# Patient Record
Sex: MALE | Race: ASIAN | NOT HISPANIC OR LATINO
[De-identification: names, ages, dates, MRNs, and addresses within clinical notes are randomized per-mention and may not be internally consistent; named-entity substitution may affect disease eponyms.]

---

## 2024-07-05 ENCOUNTER — TRANSCRIPTION ENCOUNTER (OUTPATIENT)
Age: 74
End: 2024-07-05

## 2024-07-06 ENCOUNTER — INPATIENT (INPATIENT)
Facility: HOSPITAL | Age: 74
LOS: 6 days | Discharge: HOME | DRG: 694 | End: 2024-07-13
Attending: INTERNAL MEDICINE | Admitting: UROLOGY
Payer: MEDICARE

## 2024-07-06 VITALS
SYSTOLIC BLOOD PRESSURE: 148 MMHG | HEART RATE: 112 BPM | OXYGEN SATURATION: 95 % | TEMPERATURE: 98 F | WEIGHT: 175.05 LBS | HEIGHT: 71 IN | DIASTOLIC BLOOD PRESSURE: 82 MMHG | RESPIRATION RATE: 20 BRPM

## 2024-07-06 DIAGNOSIS — N20.1 CALCULUS OF URETER: ICD-10-CM

## 2024-07-06 LAB
ALBUMIN SERPL ELPH-MCNC: 2.5 G/DL — LOW (ref 3.3–5)
ALBUMIN SERPL ELPH-MCNC: 3 G/DL — LOW (ref 3.3–5)
ALBUMIN SERPL ELPH-MCNC: 3.5 G/DL — SIGNIFICANT CHANGE UP (ref 3.3–5)
ALP SERPL-CCNC: 135 U/L — HIGH (ref 40–120)
ALP SERPL-CCNC: 149 U/L — HIGH (ref 40–120)
ALP SERPL-CCNC: 167 U/L — HIGH (ref 40–120)
ALT FLD-CCNC: 15 U/L — SIGNIFICANT CHANGE UP (ref 10–45)
ALT FLD-CCNC: 18 U/L — SIGNIFICANT CHANGE UP (ref 10–45)
ALT FLD-CCNC: 21 U/L — SIGNIFICANT CHANGE UP (ref 10–45)
ANION GAP SERPL CALC-SCNC: 12 MMOL/L — SIGNIFICANT CHANGE UP (ref 5–17)
ANION GAP SERPL CALC-SCNC: 15 MMOL/L — SIGNIFICANT CHANGE UP (ref 5–17)
ANION GAP SERPL CALC-SCNC: 16 MMOL/L — SIGNIFICANT CHANGE UP (ref 5–17)
APPEARANCE UR: ABNORMAL
APTT BLD: 27.9 SEC — SIGNIFICANT CHANGE UP (ref 24.5–35.6)
AST SERPL-CCNC: 14 U/L — SIGNIFICANT CHANGE UP (ref 10–40)
AST SERPL-CCNC: 15 U/L — SIGNIFICANT CHANGE UP (ref 10–40)
AST SERPL-CCNC: 25 U/L — SIGNIFICANT CHANGE UP (ref 10–40)
BACTERIA # UR AUTO: ABNORMAL /HPF
BASE EXCESS BLDV CALC-SCNC: -1.9 MMOL/L — SIGNIFICANT CHANGE UP (ref -2–3)
BASE EXCESS BLDV CALC-SCNC: -2 MMOL/L — SIGNIFICANT CHANGE UP (ref -2–3)
BASE EXCESS BLDV CALC-SCNC: -2.1 MMOL/L — LOW (ref -2–3)
BASOPHILS # BLD AUTO: 0 K/UL — SIGNIFICANT CHANGE UP (ref 0–0.2)
BASOPHILS NFR BLD AUTO: 0 % — SIGNIFICANT CHANGE UP (ref 0–2)
BILIRUB SERPL-MCNC: 0.5 MG/DL — SIGNIFICANT CHANGE UP (ref 0.2–1.2)
BILIRUB SERPL-MCNC: 0.6 MG/DL — SIGNIFICANT CHANGE UP (ref 0.2–1.2)
BILIRUB SERPL-MCNC: 0.8 MG/DL — SIGNIFICANT CHANGE UP (ref 0.2–1.2)
BILIRUB UR-MCNC: NEGATIVE — SIGNIFICANT CHANGE UP
BUN SERPL-MCNC: 30 MG/DL — HIGH (ref 7–23)
BUN SERPL-MCNC: 36 MG/DL — HIGH (ref 7–23)
BUN SERPL-MCNC: 36 MG/DL — HIGH (ref 7–23)
CA-I SERPL-SCNC: 1.08 MMOL/L — LOW (ref 1.15–1.33)
CA-I SERPL-SCNC: 1.19 MMOL/L — SIGNIFICANT CHANGE UP (ref 1.15–1.33)
CA-I SERPL-SCNC: 1.19 MMOL/L — SIGNIFICANT CHANGE UP (ref 1.15–1.33)
CALCIUM SERPL-MCNC: 8 MG/DL — LOW (ref 8.4–10.5)
CALCIUM SERPL-MCNC: 8.6 MG/DL — SIGNIFICANT CHANGE UP (ref 8.4–10.5)
CALCIUM SERPL-MCNC: 9.6 MG/DL — SIGNIFICANT CHANGE UP (ref 8.4–10.5)
CAST: 3 /LPF — SIGNIFICANT CHANGE UP (ref 0–4)
CHLORIDE BLDV-SCNC: 103 MMOL/L — SIGNIFICANT CHANGE UP (ref 96–108)
CHLORIDE BLDV-SCNC: 91 MMOL/L — LOW (ref 96–108)
CHLORIDE BLDV-SCNC: 93 MMOL/L — LOW (ref 96–108)
CHLORIDE SERPL-SCNC: 102 MMOL/L — SIGNIFICANT CHANGE UP (ref 96–108)
CHLORIDE SERPL-SCNC: 88 MMOL/L — LOW (ref 96–108)
CHLORIDE SERPL-SCNC: 90 MMOL/L — LOW (ref 96–108)
CO2 BLDV-SCNC: 23 MMOL/L — SIGNIFICANT CHANGE UP (ref 22–26)
CO2 BLDV-SCNC: 25 MMOL/L — SIGNIFICANT CHANGE UP (ref 22–26)
CO2 BLDV-SCNC: 26 MMOL/L — SIGNIFICANT CHANGE UP (ref 22–26)
CO2 SERPL-SCNC: 19 MMOL/L — LOW (ref 22–31)
CO2 SERPL-SCNC: 20 MMOL/L — LOW (ref 22–31)
CO2 SERPL-SCNC: 20 MMOL/L — LOW (ref 22–31)
COLOR SPEC: YELLOW — SIGNIFICANT CHANGE UP
CREAT SERPL-MCNC: 1.48 MG/DL — HIGH (ref 0.5–1.3)
CREAT SERPL-MCNC: 1.72 MG/DL — HIGH (ref 0.5–1.3)
CREAT SERPL-MCNC: 1.85 MG/DL — HIGH (ref 0.5–1.3)
DIFF PNL FLD: ABNORMAL
EGFR: 38 ML/MIN/1.73M2 — LOW
EGFR: 38 ML/MIN/1.73M2 — LOW
EGFR: 41 ML/MIN/1.73M2 — LOW
EGFR: 41 ML/MIN/1.73M2 — LOW
EGFR: 50 ML/MIN/1.73M2 — LOW
EGFR: 50 ML/MIN/1.73M2 — LOW
EOSINOPHIL # BLD AUTO: 0 K/UL — SIGNIFICANT CHANGE UP (ref 0–0.5)
EOSINOPHIL NFR BLD AUTO: 0 % — SIGNIFICANT CHANGE UP (ref 0–6)
GAS PNL BLDV: 123 MMOL/L — LOW (ref 136–145)
GAS PNL BLDV: 125 MMOL/L — LOW (ref 136–145)
GAS PNL BLDV: 135 MMOL/L — LOW (ref 136–145)
GAS PNL BLDV: SIGNIFICANT CHANGE UP
GLUCOSE BLDC GLUCOMTR-MCNC: 309 MG/DL — HIGH (ref 70–99)
GLUCOSE BLDV-MCNC: 157 MG/DL — HIGH (ref 70–99)
GLUCOSE BLDV-MCNC: 563 MG/DL — CRITICAL HIGH (ref 70–99)
GLUCOSE BLDV-MCNC: >660 MG/DL — CRITICAL HIGH (ref 70–99)
GLUCOSE SERPL-MCNC: 163 MG/DL — HIGH (ref 70–99)
GLUCOSE SERPL-MCNC: 524 MG/DL — CRITICAL HIGH (ref 70–99)
GLUCOSE SERPL-MCNC: 642 MG/DL — CRITICAL HIGH (ref 70–99)
GLUCOSE UR QL: >=1000 MG/DL
HCO3 BLDV-SCNC: 22 MMOL/L — SIGNIFICANT CHANGE UP (ref 22–29)
HCO3 BLDV-SCNC: 24 MMOL/L — SIGNIFICANT CHANGE UP (ref 22–29)
HCO3 BLDV-SCNC: 25 MMOL/L — SIGNIFICANT CHANGE UP (ref 22–29)
HCT VFR BLD CALC: 28.8 % — LOW (ref 39–50)
HCT VFR BLD CALC: 32.8 % — LOW (ref 39–50)
HCT VFR BLDA CALC: 27 % — LOW (ref 39–51)
HCT VFR BLDA CALC: 33 % — LOW (ref 39–51)
HCT VFR BLDA CALC: 34 % — LOW (ref 39–51)
HGB BLD CALC-MCNC: 11 G/DL — LOW (ref 12.6–17.4)
HGB BLD CALC-MCNC: 11.4 G/DL — LOW (ref 12.6–17.4)
HGB BLD CALC-MCNC: 9.1 G/DL — LOW (ref 12.6–17.4)
HGB BLD-MCNC: 11.4 G/DL — LOW (ref 13–17)
HGB BLD-MCNC: 9.7 G/DL — LOW (ref 13–17)
HOROWITZ INDEX BLDV+IHG-RTO: 21 — SIGNIFICANT CHANGE UP
INR BLD: 1.22 RATIO — HIGH (ref 0.85–1.18)
KETONES UR-MCNC: 15 MG/DL
LACTATE BLDV-MCNC: 2.1 MMOL/L — HIGH (ref 0.5–2)
LACTATE BLDV-MCNC: 2.3 MMOL/L — HIGH (ref 0.5–2)
LACTATE BLDV-MCNC: 2.8 MMOL/L — HIGH (ref 0.5–2)
LACTATE SERPL-SCNC: 2.1 MMOL/L — HIGH (ref 0.5–2)
LEUKOCYTE ESTERASE UR-ACNC: ABNORMAL
LYMPHOCYTES # BLD AUTO: 0.36 K/UL — LOW (ref 1–3.3)
LYMPHOCYTES # BLD AUTO: 1.7 % — LOW (ref 13–44)
MAGNESIUM SERPL-MCNC: 2.3 MG/DL — SIGNIFICANT CHANGE UP (ref 1.6–2.6)
MANUAL SMEAR VERIFICATION: SIGNIFICANT CHANGE UP
MCHC RBC-ENTMCNC: 29.8 PG — SIGNIFICANT CHANGE UP (ref 27–34)
MCHC RBC-ENTMCNC: 30.1 PG — SIGNIFICANT CHANGE UP (ref 27–34)
MCHC RBC-ENTMCNC: 33.7 GM/DL — SIGNIFICANT CHANGE UP (ref 32–36)
MCHC RBC-ENTMCNC: 34.8 GM/DL — SIGNIFICANT CHANGE UP (ref 32–36)
MCV RBC AUTO: 86.5 FL — SIGNIFICANT CHANGE UP (ref 80–100)
MCV RBC AUTO: 88.3 FL — SIGNIFICANT CHANGE UP (ref 80–100)
MONOCYTES # BLD AUTO: 0.36 K/UL — SIGNIFICANT CHANGE UP (ref 0–0.9)
MONOCYTES NFR BLD AUTO: 1.7 % — LOW (ref 2–14)
NEUTROPHILS # BLD AUTO: 20.31 K/UL — HIGH (ref 1.8–7.4)
NEUTROPHILS NFR BLD AUTO: 94 % — HIGH (ref 43–77)
NEUTS BAND # BLD: 2.6 % — SIGNIFICANT CHANGE UP (ref 0–8)
NEUTS BAND NFR BLD: 2.6 % — SIGNIFICANT CHANGE UP (ref 0–8)
NITRITE UR-MCNC: POSITIVE
NRBC # BLD: 0 /100 WBCS — SIGNIFICANT CHANGE UP (ref 0–0)
NRBC BLD-RTO: 0 /100 WBCS — SIGNIFICANT CHANGE UP (ref 0–0)
PCO2 BLDV: 36 MMHG — LOW (ref 42–55)
PCO2 BLDV: 43 MMHG — SIGNIFICANT CHANGE UP (ref 42–55)
PCO2 BLDV: 50 MMHG — SIGNIFICANT CHANGE UP (ref 42–55)
PH BLDV: 7.3 — LOW (ref 7.32–7.43)
PH BLDV: 7.35 — SIGNIFICANT CHANGE UP (ref 7.32–7.43)
PH BLDV: 7.4 — SIGNIFICANT CHANGE UP (ref 7.32–7.43)
PH UR: 6.5 — SIGNIFICANT CHANGE UP (ref 5–8)
PHOSPHATE SERPL-MCNC: 2.3 MG/DL — LOW (ref 2.5–4.5)
PLAT MORPH BLD: NORMAL — SIGNIFICANT CHANGE UP
PLATELET # BLD AUTO: 340 K/UL — SIGNIFICANT CHANGE UP (ref 150–400)
PLATELET # BLD AUTO: 349 K/UL — SIGNIFICANT CHANGE UP (ref 150–400)
PO2 BLDV: 29 MMHG — SIGNIFICANT CHANGE UP (ref 25–45)
PO2 BLDV: 37 MMHG — SIGNIFICANT CHANGE UP (ref 25–45)
PO2 BLDV: 44 MMHG — SIGNIFICANT CHANGE UP (ref 25–45)
POTASSIUM BLDV-SCNC: 3.7 MMOL/L — SIGNIFICANT CHANGE UP (ref 3.5–5.1)
POTASSIUM BLDV-SCNC: 4.2 MMOL/L — SIGNIFICANT CHANGE UP (ref 3.5–5.1)
POTASSIUM BLDV-SCNC: 4.6 MMOL/L — SIGNIFICANT CHANGE UP (ref 3.5–5.1)
POTASSIUM SERPL-MCNC: 3.9 MMOL/L — SIGNIFICANT CHANGE UP (ref 3.5–5.3)
POTASSIUM SERPL-MCNC: 4.3 MMOL/L — SIGNIFICANT CHANGE UP (ref 3.5–5.3)
POTASSIUM SERPL-MCNC: 4.6 MMOL/L — SIGNIFICANT CHANGE UP (ref 3.5–5.3)
POTASSIUM SERPL-SCNC: 3.9 MMOL/L — SIGNIFICANT CHANGE UP (ref 3.5–5.3)
POTASSIUM SERPL-SCNC: 4.3 MMOL/L — SIGNIFICANT CHANGE UP (ref 3.5–5.3)
POTASSIUM SERPL-SCNC: 4.6 MMOL/L — SIGNIFICANT CHANGE UP (ref 3.5–5.3)
PROT SERPL-MCNC: 6.4 G/DL — SIGNIFICANT CHANGE UP (ref 6–8.3)
PROT SERPL-MCNC: 7.5 G/DL — SIGNIFICANT CHANGE UP (ref 6–8.3)
PROT SERPL-MCNC: 8.1 G/DL — SIGNIFICANT CHANGE UP (ref 6–8.3)
PROT UR-MCNC: 100 MG/DL
PROTHROM AB SERPL-ACNC: 12.7 SEC — SIGNIFICANT CHANGE UP (ref 9.5–13)
RBC # BLD: 3.26 M/UL — LOW (ref 4.2–5.8)
RBC # BLD: 3.79 M/UL — LOW (ref 4.2–5.8)
RBC # FLD: 11.8 % — SIGNIFICANT CHANGE UP (ref 10.3–14.5)
RBC # FLD: 11.8 % — SIGNIFICANT CHANGE UP (ref 10.3–14.5)
RBC BLD AUTO: SIGNIFICANT CHANGE UP
RBC CASTS # UR COMP ASSIST: 20 /HPF — HIGH (ref 0–4)
REVIEW: SIGNIFICANT CHANGE UP
SAO2 % BLDV: 42.8 % — LOW (ref 67–88)
SAO2 % BLDV: 66.5 % — LOW (ref 67–88)
SAO2 % BLDV: 74.1 % — SIGNIFICANT CHANGE UP (ref 67–88)
SODIUM SERPL-SCNC: 123 MMOL/L — LOW (ref 135–145)
SODIUM SERPL-SCNC: 125 MMOL/L — LOW (ref 135–145)
SODIUM SERPL-SCNC: 134 MMOL/L — LOW (ref 135–145)
SP GR SPEC: 1.02 — SIGNIFICANT CHANGE UP (ref 1–1.03)
SQUAMOUS # UR AUTO: 1 /HPF — SIGNIFICANT CHANGE UP (ref 0–5)
TROPONIN T, HIGH SENSITIVITY RESULT: 21 NG/L — SIGNIFICANT CHANGE UP (ref 0–51)
UROBILINOGEN FLD QL: 1 MG/DL — SIGNIFICANT CHANGE UP (ref 0.2–1)
WBC # BLD: 21.03 K/UL — HIGH (ref 3.8–10.5)
WBC # BLD: 21.27 K/UL — HIGH (ref 3.8–10.5)
WBC # FLD AUTO: 21.03 K/UL — HIGH (ref 3.8–10.5)
WBC # FLD AUTO: 21.27 K/UL — HIGH (ref 3.8–10.5)
WBC UR QL: >998 /HPF — HIGH (ref 0–5)

## 2024-07-06 PROCEDURE — 74176 CT ABD & PELVIS W/O CONTRAST: CPT | Mod: 26,MC

## 2024-07-06 PROCEDURE — 52351 CYSTOURETERO & OR PYELOSCOPE: CPT | Mod: 22,LT

## 2024-07-06 PROCEDURE — 71045 X-RAY EXAM CHEST 1 VIEW: CPT | Mod: 26

## 2024-07-06 PROCEDURE — 76770 US EXAM ABDO BACK WALL COMP: CPT | Mod: 26

## 2024-07-06 PROCEDURE — 99223 1ST HOSP IP/OBS HIGH 75: CPT | Mod: 25

## 2024-07-06 PROCEDURE — 74420 UROGRAPHY RTRGR +-KUB: CPT | Mod: 26

## 2024-07-06 PROCEDURE — 99285 EMERGENCY DEPT VISIT HI MDM: CPT

## 2024-07-06 DEVICE — URETERAL CATH SOF-FLEX OPEN END 6FR .040" X 70CM: Type: IMPLANTABLE DEVICE | Site: LEFT | Status: FUNCTIONAL

## 2024-07-06 DEVICE — URETERAL CATH IMAGER II BERN 5FR 65CM: Type: IMPLANTABLE DEVICE | Site: LEFT | Status: FUNCTIONAL

## 2024-07-06 DEVICE — GUIDEWIRE SENSOR DUAL-FLEX NITINOL STRAIGHT .035" X 150CM: Type: IMPLANTABLE DEVICE | Site: LEFT | Status: FUNCTIONAL

## 2024-07-06 DEVICE — GWIRE ANGLE 0.035INX3CM: Type: IMPLANTABLE DEVICE | Site: LEFT | Status: FUNCTIONAL

## 2024-07-06 DEVICE — URETERAL STENT CONTOUR VL 6FR 22-30CM: Type: IMPLANTABLE DEVICE | Site: LEFT | Status: FUNCTIONAL

## 2024-07-06 RX ORDER — TRAMADOL HYDROCHLORIDE AND ACETAMINOPHEN 37.5; 325 MG/1; MG/1
1 TABLET ORAL EVERY 4 HOURS
Refills: 0 | Status: DISCONTINUED | OUTPATIENT
Start: 2024-07-06 | End: 2024-07-06

## 2024-07-06 RX ORDER — INSULIN LISPRO 100 U/ML
5 INJECTION, SOLUTION INTRAVENOUS; SUBCUTANEOUS ONCE
Refills: 0 | Status: COMPLETED | OUTPATIENT
Start: 2024-07-06 | End: 2024-07-06

## 2024-07-06 RX ORDER — SENNA 187 MG
2 TABLET ORAL AT BEDTIME
Refills: 0 | Status: DISCONTINUED | OUTPATIENT
Start: 2024-07-06 | End: 2024-07-06

## 2024-07-06 RX ORDER — CEFTRIAXONE 500 MG/1
1000 INJECTION, POWDER, FOR SOLUTION INTRAMUSCULAR; INTRAVENOUS EVERY 24 HOURS
Refills: 0 | Status: CANCELLED | OUTPATIENT
Start: 2024-07-07 | End: 2024-07-06

## 2024-07-06 RX ORDER — HEPARIN SODIUM 1000 [USP'U]/ML
5000 INJECTION INTRAVENOUS; SUBCUTANEOUS EVERY 8 HOURS
Refills: 0 | Status: DISCONTINUED | OUTPATIENT
Start: 2024-07-06 | End: 2024-07-06

## 2024-07-06 RX ORDER — SODIUM CHLORIDE 9 G/1000ML
1000 INJECTION, SOLUTION INTRAVENOUS
Refills: 0 | Status: DISCONTINUED | OUTPATIENT
Start: 2024-07-06 | End: 2024-07-08

## 2024-07-06 RX ORDER — CEFTRIAXONE 500 MG/1
1000 INJECTION, POWDER, FOR SOLUTION INTRAMUSCULAR; INTRAVENOUS ONCE
Refills: 0 | Status: COMPLETED | OUTPATIENT
Start: 2024-07-06 | End: 2024-07-06

## 2024-07-06 RX ORDER — ONDANSETRON HCL/PF 4 MG/2 ML
4 VIAL (ML) INJECTION EVERY 6 HOURS
Refills: 0 | Status: DISCONTINUED | OUTPATIENT
Start: 2024-07-06 | End: 2024-07-06

## 2024-07-06 RX ORDER — PHENYLEPHRINE HCL IN 0.9% NACL 0.5 MG/5ML
0.4 SYRINGE (ML) INTRAVENOUS
Qty: 40 | Refills: 0 | Status: DISCONTINUED | OUTPATIENT
Start: 2024-07-06 | End: 2024-07-07

## 2024-07-06 RX ORDER — SODIUM CHLORIDE 9 G/1000ML
1000 INJECTION, SOLUTION INTRAVENOUS ONCE
Refills: 0 | Status: COMPLETED | OUTPATIENT
Start: 2024-07-06 | End: 2024-07-06

## 2024-07-06 RX ORDER — TRAMADOL HYDROCHLORIDE AND ACETAMINOPHEN 37.5; 325 MG/1; MG/1
2 TABLET ORAL EVERY 6 HOURS
Refills: 0 | Status: DISCONTINUED | OUTPATIENT
Start: 2024-07-06 | End: 2024-07-06

## 2024-07-06 RX ORDER — ACETAMINOPHEN 500 MG/5ML
1000 LIQUID (ML) ORAL ONCE
Refills: 0 | Status: COMPLETED | OUTPATIENT
Start: 2024-07-06 | End: 2024-07-06

## 2024-07-06 RX ORDER — CALCIUM GLUCONATE 20 MG/ML
2 INJECTION, SOLUTION INTRAVENOUS ONCE
Refills: 0 | Status: COMPLETED | OUTPATIENT
Start: 2024-07-06 | End: 2024-07-07

## 2024-07-06 RX ORDER — METOCLOPRAMIDE HCL 10 MG
10 TABLET ORAL
Refills: 0 | Status: DISCONTINUED | OUTPATIENT
Start: 2024-07-06 | End: 2024-07-06

## 2024-07-06 RX ADMIN — CEFTRIAXONE 100 MILLIGRAM(S): 500 INJECTION, POWDER, FOR SOLUTION INTRAMUSCULAR; INTRAVENOUS at 15:37

## 2024-07-06 RX ADMIN — SODIUM CHLORIDE 1000 MILLILITER(S): 9 INJECTION, SOLUTION INTRAVENOUS at 16:38

## 2024-07-06 RX ADMIN — INSULIN LISPRO 5 UNIT(S): 100 INJECTION, SOLUTION INTRAVENOUS; SUBCUTANEOUS at 18:56

## 2024-07-06 RX ADMIN — Medication 400 MILLIGRAM(S): at 15:37

## 2024-07-06 RX ADMIN — INSULIN LISPRO 5 UNIT(S): 100 INJECTION, SOLUTION INTRAVENOUS; SUBCUTANEOUS at 16:37

## 2024-07-06 RX ADMIN — Medication 1000 MILLILITER(S): at 15:36

## 2024-07-06 RX ADMIN — SODIUM CHLORIDE 1000 MILLILITER(S): 9 INJECTION, SOLUTION INTRAVENOUS at 18:54

## 2024-07-07 DIAGNOSIS — E11.00 TYPE 2 DIABETES MELLITUS WITH HYPEROSMOLARITY WITHOUT NONKETOTIC HYPERGLYCEMIC-HYPEROSMOLAR COMA (NKHHC): ICD-10-CM

## 2024-07-07 DIAGNOSIS — E11.65 TYPE 2 DIABETES MELLITUS WITH HYPERGLYCEMIA: ICD-10-CM

## 2024-07-07 DIAGNOSIS — E78.5 HYPERLIPIDEMIA, UNSPECIFIED: ICD-10-CM

## 2024-07-07 LAB
A1C WITH ESTIMATED AVERAGE GLUCOSE RESULT: 10.7 % — HIGH (ref 4–5.6)
ADD ON TEST-SPECIMEN IN LAB: SIGNIFICANT CHANGE UP
ALBUMIN SERPL ELPH-MCNC: 2.5 G/DL — LOW (ref 3.3–5)
ALP SERPL-CCNC: 135 U/L — HIGH (ref 40–120)
ALT FLD-CCNC: 16 U/L — SIGNIFICANT CHANGE UP (ref 10–45)
ANION GAP SERPL CALC-SCNC: 16 MMOL/L — SIGNIFICANT CHANGE UP (ref 5–17)
AST SERPL-CCNC: 26 U/L — SIGNIFICANT CHANGE UP (ref 10–40)
BILIRUB SERPL-MCNC: 0.3 MG/DL — SIGNIFICANT CHANGE UP (ref 0.2–1.2)
BUN SERPL-MCNC: 32 MG/DL — HIGH (ref 7–23)
CALCIUM SERPL-MCNC: 8.3 MG/DL — LOW (ref 8.4–10.5)
CHLORIDE SERPL-SCNC: 99 MMOL/L — SIGNIFICANT CHANGE UP (ref 96–108)
CO2 SERPL-SCNC: 20 MMOL/L — LOW (ref 22–31)
CREAT SERPL-MCNC: 1.5 MG/DL — HIGH (ref 0.5–1.3)
EGFR: 49 ML/MIN/1.73M2 — LOW
EGFR: 49 ML/MIN/1.73M2 — LOW
ESTIMATED AVERAGE GLUCOSE: 260 MG/DL — HIGH (ref 68–114)
GLUCOSE BLDC GLUCOMTR-MCNC: 104 MG/DL — HIGH (ref 70–99)
GLUCOSE BLDC GLUCOMTR-MCNC: 193 MG/DL — HIGH (ref 70–99)
GLUCOSE BLDC GLUCOMTR-MCNC: 201 MG/DL — HIGH (ref 70–99)
GLUCOSE BLDC GLUCOMTR-MCNC: 243 MG/DL — HIGH (ref 70–99)
GLUCOSE BLDC GLUCOMTR-MCNC: 274 MG/DL — HIGH (ref 70–99)
GLUCOSE BLDC GLUCOMTR-MCNC: 335 MG/DL — HIGH (ref 70–99)
GLUCOSE BLDC GLUCOMTR-MCNC: 362 MG/DL — HIGH (ref 70–99)
GLUCOSE BLDC GLUCOMTR-MCNC: 373 MG/DL — HIGH (ref 70–99)
GLUCOSE SERPL-MCNC: 150 MG/DL — HIGH (ref 70–99)
GRAM STN FLD: ABNORMAL
HCT VFR BLD CALC: 29.3 % — LOW (ref 39–50)
HGB BLD-MCNC: 10 G/DL — LOW (ref 13–17)
MAGNESIUM SERPL-MCNC: 2.4 MG/DL — SIGNIFICANT CHANGE UP (ref 1.6–2.6)
MCHC RBC-ENTMCNC: 30 PG — SIGNIFICANT CHANGE UP (ref 27–34)
MCHC RBC-ENTMCNC: 34.1 GM/DL — SIGNIFICANT CHANGE UP (ref 32–36)
MCV RBC AUTO: 88 FL — SIGNIFICANT CHANGE UP (ref 80–100)
METHOD TYPE: SIGNIFICANT CHANGE UP
MSSA DNA SPEC QL NAA+PROBE: SIGNIFICANT CHANGE UP
NRBC # BLD: 0 /100 WBCS — SIGNIFICANT CHANGE UP (ref 0–0)
NRBC BLD-RTO: 0 /100 WBCS — SIGNIFICANT CHANGE UP (ref 0–0)
PHOSPHATE SERPL-MCNC: 2.4 MG/DL — LOW (ref 2.5–4.5)
PLATELET # BLD AUTO: 329 K/UL — SIGNIFICANT CHANGE UP (ref 150–400)
POTASSIUM SERPL-MCNC: 4.3 MMOL/L — SIGNIFICANT CHANGE UP (ref 3.5–5.3)
POTASSIUM SERPL-SCNC: 4.3 MMOL/L — SIGNIFICANT CHANGE UP (ref 3.5–5.3)
PROT SERPL-MCNC: 6.6 G/DL — SIGNIFICANT CHANGE UP (ref 6–8.3)
RBC # BLD: 3.33 M/UL — LOW (ref 4.2–5.8)
RBC # FLD: 11.9 % — SIGNIFICANT CHANGE UP (ref 10.3–14.5)
SODIUM SERPL-SCNC: 135 MMOL/L — SIGNIFICANT CHANGE UP (ref 135–145)
SPECIMEN SOURCE: SIGNIFICANT CHANGE UP
WBC # BLD: 23.79 K/UL — HIGH (ref 3.8–10.5)
WBC # FLD AUTO: 23.79 K/UL — HIGH (ref 3.8–10.5)

## 2024-07-07 PROCEDURE — 50432 PLMT NEPHROSTOMY CATHETER: CPT | Mod: LT

## 2024-07-07 PROCEDURE — 99222 1ST HOSP IP/OBS MODERATE 55: CPT | Mod: GC

## 2024-07-07 PROCEDURE — 99232 SBSQ HOSP IP/OBS MODERATE 35: CPT

## 2024-07-07 RX ORDER — INSULIN LISPRO 100 U/ML
INJECTION, SOLUTION INTRAVENOUS; SUBCUTANEOUS
Refills: 0 | Status: DISCONTINUED | OUTPATIENT
Start: 2024-07-07 | End: 2024-07-13

## 2024-07-07 RX ORDER — INSULIN LISPRO 100 U/ML
INJECTION, SOLUTION INTRAVENOUS; SUBCUTANEOUS EVERY 4 HOURS
Refills: 0 | Status: DISCONTINUED | OUTPATIENT
Start: 2024-07-07 | End: 2024-07-07

## 2024-07-07 RX ORDER — CEFAZOLIN SODIUM IN 0.9 % NACL 3 G/100 ML
INTRAVENOUS SOLUTION, PIGGYBACK (ML) INTRAVENOUS
Refills: 0 | Status: DISCONTINUED | OUTPATIENT
Start: 2024-07-07 | End: 2024-07-13

## 2024-07-07 RX ORDER — CEFAZOLIN SODIUM IN 0.9 % NACL 3 G/100 ML
2000 INTRAVENOUS SOLUTION, PIGGYBACK (ML) INTRAVENOUS ONCE
Refills: 0 | Status: COMPLETED | OUTPATIENT
Start: 2024-07-07 | End: 2024-07-07

## 2024-07-07 RX ORDER — INSULIN LISPRO 100 U/ML
INJECTION, SOLUTION INTRAVENOUS; SUBCUTANEOUS AT BEDTIME
Refills: 0 | Status: DISCONTINUED | OUTPATIENT
Start: 2024-07-07 | End: 2024-07-13

## 2024-07-07 RX ORDER — CEFTRIAXONE 500 MG/1
1000 INJECTION, POWDER, FOR SOLUTION INTRAMUSCULAR; INTRAVENOUS EVERY 24 HOURS
Refills: 0 | Status: DISCONTINUED | OUTPATIENT
Start: 2024-07-07 | End: 2024-07-07

## 2024-07-07 RX ORDER — INSULIN GLARGINE-YFGN 100 [IU]/ML
10 INJECTION, SOLUTION SUBCUTANEOUS ONCE
Refills: 0 | Status: COMPLETED | OUTPATIENT
Start: 2024-07-07 | End: 2024-07-07

## 2024-07-07 RX ORDER — INSULIN GLARGINE-YFGN 100 [IU]/ML
20 INJECTION, SOLUTION SUBCUTANEOUS EVERY MORNING
Refills: 0 | Status: DISCONTINUED | OUTPATIENT
Start: 2024-07-08 | End: 2024-07-09

## 2024-07-07 RX ORDER — INSULIN LISPRO 100 U/ML
8 INJECTION, SOLUTION INTRAVENOUS; SUBCUTANEOUS
Refills: 0 | Status: DISCONTINUED | OUTPATIENT
Start: 2024-07-07 | End: 2024-07-09

## 2024-07-07 RX ORDER — INSULIN LISPRO 100 U/ML
5 INJECTION, SOLUTION INTRAVENOUS; SUBCUTANEOUS
Refills: 0 | Status: DISCONTINUED | OUTPATIENT
Start: 2024-07-07 | End: 2024-07-07

## 2024-07-07 RX ORDER — INSULIN LISPRO 100 U/ML
INJECTION, SOLUTION INTRAVENOUS; SUBCUTANEOUS AT BEDTIME
Refills: 0 | Status: DISCONTINUED | OUTPATIENT
Start: 2024-07-07 | End: 2024-07-07

## 2024-07-07 RX ORDER — VANCOMYCIN HCL IN 5 % DEXTROSE 1.5G/250ML
1000 PLASTIC BAG, INJECTION (ML) INTRAVENOUS ONCE
Refills: 0 | Status: COMPLETED | OUTPATIENT
Start: 2024-07-07 | End: 2024-07-07

## 2024-07-07 RX ORDER — ACETAMINOPHEN 500 MG/5ML
1000 LIQUID (ML) ORAL EVERY 6 HOURS
Refills: 0 | Status: COMPLETED | OUTPATIENT
Start: 2024-07-07 | End: 2024-07-07

## 2024-07-07 RX ORDER — SODIUM PHOSPHATE,DIBASIC DIHYD
30 POWDER (GRAM) MISCELLANEOUS ONCE
Refills: 0 | Status: COMPLETED | OUTPATIENT
Start: 2024-07-07 | End: 2024-07-07

## 2024-07-07 RX ORDER — INSULIN LISPRO 100 U/ML
INJECTION, SOLUTION INTRAVENOUS; SUBCUTANEOUS
Refills: 0 | Status: DISCONTINUED | OUTPATIENT
Start: 2024-07-07 | End: 2024-07-07

## 2024-07-07 RX ORDER — CEFAZOLIN SODIUM IN 0.9 % NACL 3 G/100 ML
2000 INTRAVENOUS SOLUTION, PIGGYBACK (ML) INTRAVENOUS EVERY 8 HOURS
Refills: 0 | Status: DISCONTINUED | OUTPATIENT
Start: 2024-07-07 | End: 2024-07-13

## 2024-07-07 RX ADMIN — INSULIN LISPRO 10: 100 INJECTION, SOLUTION INTRAVENOUS; SUBCUTANEOUS at 11:17

## 2024-07-07 RX ADMIN — Medication 100 MILLIGRAM(S): at 14:57

## 2024-07-07 RX ADMIN — INSULIN LISPRO 8: 100 INJECTION, SOLUTION INTRAVENOUS; SUBCUTANEOUS at 07:21

## 2024-07-07 RX ADMIN — Medication 400 MILLIGRAM(S): at 05:48

## 2024-07-07 RX ADMIN — Medication 1000 MILLIGRAM(S): at 17:56

## 2024-07-07 RX ADMIN — Medication 400 MILLIGRAM(S): at 23:45

## 2024-07-07 RX ADMIN — SODIUM CHLORIDE 100 MILLILITER(S): 9 INJECTION, SOLUTION INTRAVENOUS at 01:24

## 2024-07-07 RX ADMIN — Medication 100 MILLIGRAM(S): at 21:14

## 2024-07-07 RX ADMIN — Medication 1000 MILLIGRAM(S): at 12:33

## 2024-07-07 RX ADMIN — Medication 1000 MILLIGRAM(S): at 05:55

## 2024-07-07 RX ADMIN — Medication 85 MILLIMOLE(S): at 04:36

## 2024-07-07 RX ADMIN — Medication 400 MILLIGRAM(S): at 17:41

## 2024-07-07 RX ADMIN — INSULIN GLARGINE-YFGN 10 UNIT(S): 100 INJECTION, SOLUTION SUBCUTANEOUS at 04:36

## 2024-07-07 RX ADMIN — INSULIN LISPRO 6: 100 INJECTION, SOLUTION INTRAVENOUS; SUBCUTANEOUS at 14:41

## 2024-07-07 RX ADMIN — SODIUM CHLORIDE 100 MILLILITER(S): 9 INJECTION, SOLUTION INTRAVENOUS at 07:14

## 2024-07-07 RX ADMIN — INSULIN LISPRO 4: 100 INJECTION, SOLUTION INTRAVENOUS; SUBCUTANEOUS at 18:27

## 2024-07-07 RX ADMIN — INSULIN LISPRO 8 UNIT(S): 100 INJECTION, SOLUTION INTRAVENOUS; SUBCUTANEOUS at 17:36

## 2024-07-07 RX ADMIN — Medication 400 MILLIGRAM(S): at 12:18

## 2024-07-07 RX ADMIN — Medication 250 MILLIGRAM(S): at 10:13

## 2024-07-07 RX ADMIN — CALCIUM GLUCONATE 200 GRAM(S): 20 INJECTION, SOLUTION INTRAVENOUS at 01:23

## 2024-07-07 RX ADMIN — SODIUM CHLORIDE 100 MILLILITER(S): 9 INJECTION, SOLUTION INTRAVENOUS at 19:45

## 2024-07-07 RX ADMIN — CEFTRIAXONE 100 MILLIGRAM(S): 500 INJECTION, POWDER, FOR SOLUTION INTRAMUSCULAR; INTRAVENOUS at 05:49

## 2024-07-08 LAB
-  CLINDAMYCIN: SIGNIFICANT CHANGE UP
-  ERYTHROMYCIN: SIGNIFICANT CHANGE UP
-  GENTAMICIN: SIGNIFICANT CHANGE UP
-  OXACILLIN: SIGNIFICANT CHANGE UP
-  PENICILLIN: SIGNIFICANT CHANGE UP
-  RIFAMPIN: SIGNIFICANT CHANGE UP
-  TETRACYCLINE: SIGNIFICANT CHANGE UP
-  TRIMETHOPRIM/SULFAMETHOXAZOLE: SIGNIFICANT CHANGE UP
-  VANCOMYCIN: SIGNIFICANT CHANGE UP
ALBUMIN SERPL ELPH-MCNC: 2.3 G/DL — LOW (ref 3.3–5)
ALBUMIN SERPL ELPH-MCNC: 2.5 G/DL — LOW (ref 3.3–5)
ALP SERPL-CCNC: 130 U/L — HIGH (ref 40–120)
ALP SERPL-CCNC: 147 U/L — HIGH (ref 40–120)
ALT FLD-CCNC: 12 U/L — SIGNIFICANT CHANGE UP (ref 10–45)
ALT FLD-CCNC: 14 U/L — SIGNIFICANT CHANGE UP (ref 10–45)
ANION GAP SERPL CALC-SCNC: 13 MMOL/L — SIGNIFICANT CHANGE UP (ref 5–17)
ANION GAP SERPL CALC-SCNC: 16 MMOL/L — SIGNIFICANT CHANGE UP (ref 5–17)
AST SERPL-CCNC: 14 U/L — SIGNIFICANT CHANGE UP (ref 10–40)
AST SERPL-CCNC: 21 U/L — SIGNIFICANT CHANGE UP (ref 10–40)
BASE EXCESS BLDV CALC-SCNC: 0.2 MMOL/L — SIGNIFICANT CHANGE UP (ref -2–3)
BASE EXCESS BLDV CALC-SCNC: 2.1 MMOL/L — SIGNIFICANT CHANGE UP (ref -2–3)
BILIRUB SERPL-MCNC: 0.3 MG/DL — SIGNIFICANT CHANGE UP (ref 0.2–1.2)
BILIRUB SERPL-MCNC: 0.4 MG/DL — SIGNIFICANT CHANGE UP (ref 0.2–1.2)
BUN SERPL-MCNC: 29 MG/DL — HIGH (ref 7–23)
BUN SERPL-MCNC: 32 MG/DL — HIGH (ref 7–23)
CA-I SERPL-SCNC: 1.1 MMOL/L — LOW (ref 1.15–1.33)
CA-I SERPL-SCNC: 1.16 MMOL/L — SIGNIFICANT CHANGE UP (ref 1.15–1.33)
CALCIUM SERPL-MCNC: 8.1 MG/DL — LOW (ref 8.4–10.5)
CALCIUM SERPL-MCNC: 8.2 MG/DL — LOW (ref 8.4–10.5)
CHLORIDE BLDV-SCNC: 102 MMOL/L — SIGNIFICANT CHANGE UP (ref 96–108)
CHLORIDE BLDV-SCNC: 97 MMOL/L — SIGNIFICANT CHANGE UP (ref 96–108)
CHLORIDE SERPL-SCNC: 96 MMOL/L — SIGNIFICANT CHANGE UP (ref 96–108)
CHLORIDE SERPL-SCNC: 99 MMOL/L — SIGNIFICANT CHANGE UP (ref 96–108)
CO2 BLDV-SCNC: 26 MMOL/L — SIGNIFICANT CHANGE UP (ref 22–26)
CO2 BLDV-SCNC: 29 MMOL/L — HIGH (ref 22–26)
CO2 SERPL-SCNC: 21 MMOL/L — LOW (ref 22–31)
CO2 SERPL-SCNC: 21 MMOL/L — LOW (ref 22–31)
CREAT SERPL-MCNC: 1.32 MG/DL — HIGH (ref 0.5–1.3)
CREAT SERPL-MCNC: 1.35 MG/DL — HIGH (ref 0.5–1.3)
CULTURE RESULTS: ABNORMAL
CULTURE RESULTS: ABNORMAL
EGFR: 55 ML/MIN/1.73M2 — LOW
EGFR: 55 ML/MIN/1.73M2 — LOW
EGFR: 57 ML/MIN/1.73M2 — LOW
EGFR: 57 ML/MIN/1.73M2 — LOW
GAS PNL BLDV: 131 MMOL/L — LOW (ref 136–145)
GAS PNL BLDV: 131 MMOL/L — LOW (ref 136–145)
GAS PNL BLDV: SIGNIFICANT CHANGE UP
GLUCOSE BLDC GLUCOMTR-MCNC: 136 MG/DL — HIGH (ref 70–99)
GLUCOSE BLDC GLUCOMTR-MCNC: 151 MG/DL — HIGH (ref 70–99)
GLUCOSE BLDC GLUCOMTR-MCNC: 156 MG/DL — HIGH (ref 70–99)
GLUCOSE BLDC GLUCOMTR-MCNC: 178 MG/DL — HIGH (ref 70–99)
GLUCOSE BLDC GLUCOMTR-MCNC: 211 MG/DL — HIGH (ref 70–99)
GLUCOSE BLDC GLUCOMTR-MCNC: 221 MG/DL — HIGH (ref 70–99)
GLUCOSE BLDC GLUCOMTR-MCNC: 223 MG/DL — HIGH (ref 70–99)
GLUCOSE BLDC GLUCOMTR-MCNC: 272 MG/DL — HIGH (ref 70–99)
GLUCOSE BLDV-MCNC: 155 MG/DL — HIGH (ref 70–99)
GLUCOSE BLDV-MCNC: 212 MG/DL — HIGH (ref 70–99)
GLUCOSE SERPL-MCNC: 155 MG/DL — HIGH (ref 70–99)
GLUCOSE SERPL-MCNC: 209 MG/DL — HIGH (ref 70–99)
HCO3 BLDV-SCNC: 25 MMOL/L — SIGNIFICANT CHANGE UP (ref 22–29)
HCO3 BLDV-SCNC: 27 MMOL/L — SIGNIFICANT CHANGE UP (ref 22–29)
HCT VFR BLD CALC: 28.8 % — LOW (ref 39–50)
HCT VFR BLD CALC: 31 % — LOW (ref 39–50)
HCT VFR BLDA CALC: 30 % — LOW (ref 39–51)
HCT VFR BLDA CALC: 33 % — LOW (ref 39–51)
HGB BLD CALC-MCNC: 10 G/DL — LOW (ref 12.6–17.4)
HGB BLD CALC-MCNC: 11 G/DL — LOW (ref 12.6–17.4)
HGB BLD-MCNC: 10.5 G/DL — LOW (ref 13–17)
HGB BLD-MCNC: 9.9 G/DL — LOW (ref 13–17)
HOROWITZ INDEX BLDV+IHG-RTO: 21 — SIGNIFICANT CHANGE UP
HOROWITZ INDEX BLDV+IHG-RTO: 21 — SIGNIFICANT CHANGE UP
LACTATE BLDV-MCNC: 0.8 MMOL/L — SIGNIFICANT CHANGE UP (ref 0.5–2)
LACTATE BLDV-MCNC: 1.5 MMOL/L — SIGNIFICANT CHANGE UP (ref 0.5–2)
MAGNESIUM SERPL-MCNC: 2 MG/DL — SIGNIFICANT CHANGE UP (ref 1.6–2.6)
MAGNESIUM SERPL-MCNC: 2.2 MG/DL — SIGNIFICANT CHANGE UP (ref 1.6–2.6)
MCHC RBC-ENTMCNC: 29.8 PG — SIGNIFICANT CHANGE UP (ref 27–34)
MCHC RBC-ENTMCNC: 30.3 PG — SIGNIFICANT CHANGE UP (ref 27–34)
MCHC RBC-ENTMCNC: 33.9 GM/DL — SIGNIFICANT CHANGE UP (ref 32–36)
MCHC RBC-ENTMCNC: 34.4 GM/DL — SIGNIFICANT CHANGE UP (ref 32–36)
MCV RBC AUTO: 88.1 FL — SIGNIFICANT CHANGE UP (ref 80–100)
MCV RBC AUTO: 88.1 FL — SIGNIFICANT CHANGE UP (ref 80–100)
METHOD TYPE: SIGNIFICANT CHANGE UP
NRBC # BLD: 0 /100 WBCS — SIGNIFICANT CHANGE UP (ref 0–0)
NRBC # BLD: 0 /100 WBCS — SIGNIFICANT CHANGE UP (ref 0–0)
NRBC BLD-RTO: 0 /100 WBCS — SIGNIFICANT CHANGE UP (ref 0–0)
NRBC BLD-RTO: 0 /100 WBCS — SIGNIFICANT CHANGE UP (ref 0–0)
ORGANISM # SPEC MICROSCOPIC CNT: ABNORMAL
PCO2 BLDV: 38 MMHG — LOW (ref 42–55)
PCO2 BLDV: 44 MMHG — SIGNIFICANT CHANGE UP (ref 42–55)
PH BLDV: 7.4 — SIGNIFICANT CHANGE UP (ref 7.32–7.43)
PH BLDV: 7.42 — SIGNIFICANT CHANGE UP (ref 7.32–7.43)
PHOSPHATE SERPL-MCNC: 2.4 MG/DL — LOW (ref 2.5–4.5)
PHOSPHATE SERPL-MCNC: 3 MG/DL — SIGNIFICANT CHANGE UP (ref 2.5–4.5)
PLATELET # BLD AUTO: 358 K/UL — SIGNIFICANT CHANGE UP (ref 150–400)
PLATELET # BLD AUTO: 404 K/UL — HIGH (ref 150–400)
PO2 BLDV: 35 MMHG — SIGNIFICANT CHANGE UP (ref 25–45)
PO2 BLDV: 76 MMHG — HIGH (ref 25–45)
POTASSIUM BLDV-SCNC: 3.3 MMOL/L — LOW (ref 3.5–5.1)
POTASSIUM BLDV-SCNC: 3.6 MMOL/L — SIGNIFICANT CHANGE UP (ref 3.5–5.1)
POTASSIUM SERPL-MCNC: 3.6 MMOL/L — SIGNIFICANT CHANGE UP (ref 3.5–5.3)
POTASSIUM SERPL-MCNC: 3.7 MMOL/L — SIGNIFICANT CHANGE UP (ref 3.5–5.3)
POTASSIUM SERPL-SCNC: 3.6 MMOL/L — SIGNIFICANT CHANGE UP (ref 3.5–5.3)
POTASSIUM SERPL-SCNC: 3.7 MMOL/L — SIGNIFICANT CHANGE UP (ref 3.5–5.3)
PROT SERPL-MCNC: 6.3 G/DL — SIGNIFICANT CHANGE UP (ref 6–8.3)
PROT SERPL-MCNC: 6.7 G/DL — SIGNIFICANT CHANGE UP (ref 6–8.3)
RBC # BLD: 3.27 M/UL — LOW (ref 4.2–5.8)
RBC # BLD: 3.52 M/UL — LOW (ref 4.2–5.8)
RBC # FLD: 12.1 % — SIGNIFICANT CHANGE UP (ref 10.3–14.5)
RBC # FLD: 12.3 % — SIGNIFICANT CHANGE UP (ref 10.3–14.5)
SAO2 % BLDV: 66 % — LOW (ref 67–88)
SAO2 % BLDV: 96.3 % — HIGH (ref 67–88)
SODIUM SERPL-SCNC: 133 MMOL/L — LOW (ref 135–145)
SODIUM SERPL-SCNC: 133 MMOL/L — LOW (ref 135–145)
SPECIMEN SOURCE: SIGNIFICANT CHANGE UP
SPECIMEN SOURCE: SIGNIFICANT CHANGE UP
WBC # BLD: 16.96 K/UL — HIGH (ref 3.8–10.5)
WBC # BLD: 19.76 K/UL — HIGH (ref 3.8–10.5)
WBC # FLD AUTO: 16.96 K/UL — HIGH (ref 3.8–10.5)
WBC # FLD AUTO: 19.76 K/UL — HIGH (ref 3.8–10.5)

## 2024-07-08 PROCEDURE — 99233 SBSQ HOSP IP/OBS HIGH 50: CPT

## 2024-07-08 PROCEDURE — 51702 INSERT TEMP BLADDER CATH: CPT

## 2024-07-08 PROCEDURE — 99231 SBSQ HOSP IP/OBS SF/LOW 25: CPT | Mod: 25

## 2024-07-08 PROCEDURE — 99222 1ST HOSP IP/OBS MODERATE 55: CPT

## 2024-07-08 RX ORDER — SENNA 187 MG
2 TABLET ORAL AT BEDTIME
Refills: 0 | Status: DISCONTINUED | OUTPATIENT
Start: 2024-07-08 | End: 2024-07-13

## 2024-07-08 RX ORDER — POLYETHYLENE GLYCOL 3350 17 G/17G
17 POWDER, FOR SOLUTION ORAL DAILY
Refills: 0 | Status: DISCONTINUED | OUTPATIENT
Start: 2024-07-08 | End: 2024-07-13

## 2024-07-08 RX ORDER — ENOXAPARIN SODIUM 100 MG/ML
40 INJECTION SUBCUTANEOUS EVERY 24 HOURS
Refills: 0 | Status: DISCONTINUED | OUTPATIENT
Start: 2024-07-08 | End: 2024-07-13

## 2024-07-08 RX ORDER — TAMSULOSIN HYDROCHLORIDE 0.4 MG/1
0.4 CAPSULE ORAL AT BEDTIME
Refills: 0 | Status: DISCONTINUED | OUTPATIENT
Start: 2024-07-08 | End: 2024-07-13

## 2024-07-08 RX ORDER — SOD PHOS DI, MONO/K PHOS MONO 250 MG
2 TABLET ORAL ONCE
Refills: 0 | Status: COMPLETED | OUTPATIENT
Start: 2024-07-08 | End: 2024-07-08

## 2024-07-08 RX ORDER — SODIUM PHOSPHATE,DIBASIC DIHYD
15 POWDER (GRAM) MISCELLANEOUS ONCE
Refills: 0 | Status: COMPLETED | OUTPATIENT
Start: 2024-07-08 | End: 2024-07-08

## 2024-07-08 RX ORDER — CALCIUM GLUCONATE 20 MG/ML
2 INJECTION, SOLUTION INTRAVENOUS ONCE
Refills: 0 | Status: COMPLETED | OUTPATIENT
Start: 2024-07-08 | End: 2024-07-08

## 2024-07-08 RX ADMIN — INSULIN LISPRO 2: 100 INJECTION, SOLUTION INTRAVENOUS; SUBCUTANEOUS at 16:45

## 2024-07-08 RX ADMIN — Medication 1 APPLICATION(S): at 06:29

## 2024-07-08 RX ADMIN — INSULIN LISPRO 2: 100 INJECTION, SOLUTION INTRAVENOUS; SUBCUTANEOUS at 12:33

## 2024-07-08 RX ADMIN — CALCIUM GLUCONATE 200 GRAM(S): 20 INJECTION, SOLUTION INTRAVENOUS at 16:38

## 2024-07-08 RX ADMIN — SODIUM CHLORIDE 100 MILLILITER(S): 9 INJECTION, SOLUTION INTRAVENOUS at 02:09

## 2024-07-08 RX ADMIN — INSULIN LISPRO 8 UNIT(S): 100 INJECTION, SOLUTION INTRAVENOUS; SUBCUTANEOUS at 12:33

## 2024-07-08 RX ADMIN — Medication 40 MILLIEQUIVALENT(S): at 16:38

## 2024-07-08 RX ADMIN — Medication 2 TABLET(S): at 21:22

## 2024-07-08 RX ADMIN — Medication 100 MILLIGRAM(S): at 06:28

## 2024-07-08 RX ADMIN — TAMSULOSIN HYDROCHLORIDE 0.4 MILLIGRAM(S): 0.4 CAPSULE ORAL at 21:22

## 2024-07-08 RX ADMIN — INSULIN LISPRO 8 UNIT(S): 100 INJECTION, SOLUTION INTRAVENOUS; SUBCUTANEOUS at 08:13

## 2024-07-08 RX ADMIN — SODIUM CHLORIDE 100 MILLILITER(S): 9 INJECTION, SOLUTION INTRAVENOUS at 06:28

## 2024-07-08 RX ADMIN — Medication 100 MILLIGRAM(S): at 13:24

## 2024-07-08 RX ADMIN — INSULIN GLARGINE-YFGN 20 UNIT(S): 100 INJECTION, SOLUTION SUBCUTANEOUS at 09:02

## 2024-07-08 RX ADMIN — ENOXAPARIN SODIUM 40 MILLIGRAM(S): 100 INJECTION SUBCUTANEOUS at 13:16

## 2024-07-08 RX ADMIN — Medication 2 TABLET(S): at 04:09

## 2024-07-08 RX ADMIN — Medication 63.75 MILLIMOLE(S): at 02:09

## 2024-07-08 RX ADMIN — Medication 100 MILLIGRAM(S): at 21:21

## 2024-07-08 RX ADMIN — INSULIN LISPRO 4: 100 INJECTION, SOLUTION INTRAVENOUS; SUBCUTANEOUS at 08:13

## 2024-07-08 RX ADMIN — INSULIN LISPRO 8 UNIT(S): 100 INJECTION, SOLUTION INTRAVENOUS; SUBCUTANEOUS at 16:45

## 2024-07-09 ENCOUNTER — RESULT REVIEW (OUTPATIENT)
Age: 74
End: 2024-07-09

## 2024-07-09 LAB
-  CLINDAMYCIN: SIGNIFICANT CHANGE UP
-  ERYTHROMYCIN: SIGNIFICANT CHANGE UP
-  GENTAMICIN: SIGNIFICANT CHANGE UP
-  OXACILLIN: SIGNIFICANT CHANGE UP
-  PENICILLIN: SIGNIFICANT CHANGE UP
-  RIFAMPIN: SIGNIFICANT CHANGE UP
-  TETRACYCLINE: SIGNIFICANT CHANGE UP
-  TRIMETHOPRIM/SULFAMETHOXAZOLE: SIGNIFICANT CHANGE UP
-  VANCOMYCIN: SIGNIFICANT CHANGE UP
ANION GAP SERPL CALC-SCNC: 14 MMOL/L — SIGNIFICANT CHANGE UP (ref 5–17)
BUN SERPL-MCNC: 26 MG/DL — HIGH (ref 7–23)
CALCIUM SERPL-MCNC: 8.7 MG/DL — SIGNIFICANT CHANGE UP (ref 8.4–10.5)
CHLORIDE SERPL-SCNC: 96 MMOL/L — SIGNIFICANT CHANGE UP (ref 96–108)
CO2 SERPL-SCNC: 22 MMOL/L — SIGNIFICANT CHANGE UP (ref 22–31)
CREAT SERPL-MCNC: 1.35 MG/DL — HIGH (ref 0.5–1.3)
CRP SERPL-MCNC: 188 MG/L — HIGH (ref 0–4)
EGFR: 55 ML/MIN/1.73M2 — LOW
EGFR: 55 ML/MIN/1.73M2 — LOW
ERYTHROCYTE [SEDIMENTATION RATE] IN BLOOD: 113 MM/HR — HIGH (ref 0–20)
GLUCOSE BLDC GLUCOMTR-MCNC: 111 MG/DL — HIGH (ref 70–99)
GLUCOSE BLDC GLUCOMTR-MCNC: 137 MG/DL — HIGH (ref 70–99)
GLUCOSE BLDC GLUCOMTR-MCNC: 205 MG/DL — HIGH (ref 70–99)
GLUCOSE BLDC GLUCOMTR-MCNC: 266 MG/DL — HIGH (ref 70–99)
GLUCOSE SERPL-MCNC: 156 MG/DL — HIGH (ref 70–99)
HCT VFR BLD CALC: 29.6 % — LOW (ref 39–50)
HGB BLD-MCNC: 9.6 G/DL — LOW (ref 13–17)
MCHC RBC-ENTMCNC: 28.9 PG — SIGNIFICANT CHANGE UP (ref 27–34)
MCHC RBC-ENTMCNC: 32.4 GM/DL — SIGNIFICANT CHANGE UP (ref 32–36)
MCV RBC AUTO: 89.2 FL — SIGNIFICANT CHANGE UP (ref 80–100)
METHOD TYPE: SIGNIFICANT CHANGE UP
NRBC # BLD: 0 /100 WBCS — SIGNIFICANT CHANGE UP (ref 0–0)
NRBC BLD-RTO: 0 /100 WBCS — SIGNIFICANT CHANGE UP (ref 0–0)
PLATELET # BLD AUTO: 367 K/UL — SIGNIFICANT CHANGE UP (ref 150–400)
POTASSIUM SERPL-MCNC: 3.6 MMOL/L — SIGNIFICANT CHANGE UP (ref 3.5–5.3)
POTASSIUM SERPL-SCNC: 3.6 MMOL/L — SIGNIFICANT CHANGE UP (ref 3.5–5.3)
RBC # BLD: 3.32 M/UL — LOW (ref 4.2–5.8)
RBC # FLD: 12.3 % — SIGNIFICANT CHANGE UP (ref 10.3–14.5)
SODIUM SERPL-SCNC: 132 MMOL/L — LOW (ref 135–145)
WBC # BLD: 12.05 K/UL — HIGH (ref 3.8–10.5)
WBC # FLD AUTO: 12.05 K/UL — HIGH (ref 3.8–10.5)

## 2024-07-09 PROCEDURE — 76376 3D RENDER W/INTRP POSTPROCES: CPT | Mod: 26

## 2024-07-09 PROCEDURE — 93306 TTE W/DOPPLER COMPLETE: CPT | Mod: 26

## 2024-07-09 PROCEDURE — 99232 SBSQ HOSP IP/OBS MODERATE 35: CPT

## 2024-07-09 PROCEDURE — 71250 CT THORAX DX C-: CPT | Mod: 26

## 2024-07-09 PROCEDURE — 93356 MYOCRD STRAIN IMG SPCKL TRCK: CPT

## 2024-07-09 RX ORDER — INSULIN LISPRO 100 U/ML
11 INJECTION, SOLUTION INTRAVENOUS; SUBCUTANEOUS
Refills: 0 | Status: DISCONTINUED | OUTPATIENT
Start: 2024-07-09 | End: 2024-07-13

## 2024-07-09 RX ORDER — INSULIN GLARGINE-YFGN 100 [IU]/ML
22 INJECTION, SOLUTION SUBCUTANEOUS EVERY MORNING
Refills: 0 | Status: DISCONTINUED | OUTPATIENT
Start: 2024-07-10 | End: 2024-07-10

## 2024-07-09 RX ADMIN — Medication 1 APPLICATION(S): at 18:14

## 2024-07-09 RX ADMIN — Medication 2 TABLET(S): at 22:39

## 2024-07-09 RX ADMIN — INSULIN LISPRO 8 UNIT(S): 100 INJECTION, SOLUTION INTRAVENOUS; SUBCUTANEOUS at 08:39

## 2024-07-09 RX ADMIN — INSULIN LISPRO 6: 100 INJECTION, SOLUTION INTRAVENOUS; SUBCUTANEOUS at 13:28

## 2024-07-09 RX ADMIN — TAMSULOSIN HYDROCHLORIDE 0.4 MILLIGRAM(S): 0.4 CAPSULE ORAL at 22:39

## 2024-07-09 RX ADMIN — INSULIN LISPRO 11 UNIT(S): 100 INJECTION, SOLUTION INTRAVENOUS; SUBCUTANEOUS at 17:42

## 2024-07-09 RX ADMIN — INSULIN LISPRO 4: 100 INJECTION, SOLUTION INTRAVENOUS; SUBCUTANEOUS at 08:37

## 2024-07-09 RX ADMIN — Medication 100 MILLIGRAM(S): at 05:05

## 2024-07-09 RX ADMIN — POLYETHYLENE GLYCOL 3350 17 GRAM(S): 17 POWDER, FOR SOLUTION ORAL at 12:20

## 2024-07-09 RX ADMIN — Medication 100 MILLIGRAM(S): at 13:28

## 2024-07-09 RX ADMIN — ENOXAPARIN SODIUM 40 MILLIGRAM(S): 100 INJECTION SUBCUTANEOUS at 12:19

## 2024-07-09 RX ADMIN — INSULIN GLARGINE-YFGN 20 UNIT(S): 100 INJECTION, SOLUTION SUBCUTANEOUS at 08:38

## 2024-07-09 RX ADMIN — INSULIN LISPRO 8 UNIT(S): 100 INJECTION, SOLUTION INTRAVENOUS; SUBCUTANEOUS at 13:28

## 2024-07-09 RX ADMIN — Medication 100 MILLIGRAM(S): at 22:39

## 2024-07-10 ENCOUNTER — TRANSCRIPTION ENCOUNTER (OUTPATIENT)
Age: 74
End: 2024-07-10

## 2024-07-10 LAB
-  GENTAMICIN: SIGNIFICANT CHANGE UP
-  OXACILLIN: SIGNIFICANT CHANGE UP
-  PENICILLIN: SIGNIFICANT CHANGE UP
-  RIFAMPIN: SIGNIFICANT CHANGE UP
-  TETRACYCLINE: SIGNIFICANT CHANGE UP
-  TRIMETHOPRIM/SULFAMETHOXAZOLE: SIGNIFICANT CHANGE UP
-  VANCOMYCIN: SIGNIFICANT CHANGE UP
ANION GAP SERPL CALC-SCNC: 14 MMOL/L — SIGNIFICANT CHANGE UP (ref 5–17)
BUN SERPL-MCNC: 26 MG/DL — HIGH (ref 7–23)
CALCIUM SERPL-MCNC: 8.7 MG/DL — SIGNIFICANT CHANGE UP (ref 8.4–10.5)
CHLORIDE SERPL-SCNC: 100 MMOL/L — SIGNIFICANT CHANGE UP (ref 96–108)
CO2 SERPL-SCNC: 22 MMOL/L — SIGNIFICANT CHANGE UP (ref 22–31)
CREAT SERPL-MCNC: 1.27 MG/DL — SIGNIFICANT CHANGE UP (ref 0.5–1.3)
CULTURE RESULTS: ABNORMAL
EGFR: 60 ML/MIN/1.73M2 — SIGNIFICANT CHANGE UP
EGFR: 60 ML/MIN/1.73M2 — SIGNIFICANT CHANGE UP
GLUCOSE BLDC GLUCOMTR-MCNC: 120 MG/DL — HIGH (ref 70–99)
GLUCOSE BLDC GLUCOMTR-MCNC: 150 MG/DL — HIGH (ref 70–99)
GLUCOSE BLDC GLUCOMTR-MCNC: 152 MG/DL — HIGH (ref 70–99)
GLUCOSE BLDC GLUCOMTR-MCNC: 206 MG/DL — HIGH (ref 70–99)
GLUCOSE SERPL-MCNC: 175 MG/DL — HIGH (ref 70–99)
HCT VFR BLD CALC: 29.1 % — LOW (ref 39–50)
HGB BLD-MCNC: 10.1 G/DL — LOW (ref 13–17)
MCHC RBC-ENTMCNC: 30.1 PG — SIGNIFICANT CHANGE UP (ref 27–34)
MCHC RBC-ENTMCNC: 34.7 GM/DL — SIGNIFICANT CHANGE UP (ref 32–36)
MCV RBC AUTO: 86.6 FL — SIGNIFICANT CHANGE UP (ref 80–100)
METHOD TYPE: SIGNIFICANT CHANGE UP
NRBC # BLD: 0 /100 WBCS — SIGNIFICANT CHANGE UP (ref 0–0)
NRBC BLD-RTO: 0 /100 WBCS — SIGNIFICANT CHANGE UP (ref 0–0)
ORGANISM # SPEC MICROSCOPIC CNT: ABNORMAL
ORGANISM # SPEC MICROSCOPIC CNT: ABNORMAL
PLATELET # BLD AUTO: 385 K/UL — SIGNIFICANT CHANGE UP (ref 150–400)
POTASSIUM SERPL-MCNC: 3.2 MMOL/L — LOW (ref 3.5–5.3)
POTASSIUM SERPL-SCNC: 3.2 MMOL/L — LOW (ref 3.5–5.3)
RBC # BLD: 3.36 M/UL — LOW (ref 4.2–5.8)
RBC # FLD: 12 % — SIGNIFICANT CHANGE UP (ref 10.3–14.5)
SODIUM SERPL-SCNC: 136 MMOL/L — SIGNIFICANT CHANGE UP (ref 135–145)
SPECIMEN SOURCE: SIGNIFICANT CHANGE UP
WBC # BLD: 9.31 K/UL — SIGNIFICANT CHANGE UP (ref 3.8–10.5)
WBC # FLD AUTO: 9.31 K/UL — SIGNIFICANT CHANGE UP (ref 3.8–10.5)

## 2024-07-10 PROCEDURE — 99231 SBSQ HOSP IP/OBS SF/LOW 25: CPT

## 2024-07-10 PROCEDURE — 99232 SBSQ HOSP IP/OBS MODERATE 35: CPT

## 2024-07-10 RX ORDER — INSULIN GLARGINE-YFGN 100 [IU]/ML
24 INJECTION, SOLUTION SUBCUTANEOUS EVERY MORNING
Refills: 0 | Status: DISCONTINUED | OUTPATIENT
Start: 2024-07-11 | End: 2024-07-13

## 2024-07-10 RX ADMIN — Medication 40 MILLIEQUIVALENT(S): at 14:17

## 2024-07-10 RX ADMIN — INSULIN LISPRO 2: 100 INJECTION, SOLUTION INTRAVENOUS; SUBCUTANEOUS at 11:49

## 2024-07-10 RX ADMIN — INSULIN LISPRO 11 UNIT(S): 100 INJECTION, SOLUTION INTRAVENOUS; SUBCUTANEOUS at 18:15

## 2024-07-10 RX ADMIN — Medication 40 MILLIEQUIVALENT(S): at 18:16

## 2024-07-10 RX ADMIN — Medication 100 MILLIGRAM(S): at 21:21

## 2024-07-10 RX ADMIN — ENOXAPARIN SODIUM 40 MILLIGRAM(S): 100 INJECTION SUBCUTANEOUS at 09:14

## 2024-07-10 RX ADMIN — Medication 1 APPLICATION(S): at 08:00

## 2024-07-10 RX ADMIN — Medication 2 TABLET(S): at 21:21

## 2024-07-10 RX ADMIN — INSULIN LISPRO 11 UNIT(S): 100 INJECTION, SOLUTION INTRAVENOUS; SUBCUTANEOUS at 08:15

## 2024-07-10 RX ADMIN — TAMSULOSIN HYDROCHLORIDE 0.4 MILLIGRAM(S): 0.4 CAPSULE ORAL at 21:22

## 2024-07-10 RX ADMIN — Medication 100 MILLIGRAM(S): at 14:16

## 2024-07-10 RX ADMIN — INSULIN LISPRO 11 UNIT(S): 100 INJECTION, SOLUTION INTRAVENOUS; SUBCUTANEOUS at 11:50

## 2024-07-10 RX ADMIN — Medication 100 MILLIGRAM(S): at 05:34

## 2024-07-10 RX ADMIN — INSULIN LISPRO 4: 100 INJECTION, SOLUTION INTRAVENOUS; SUBCUTANEOUS at 08:12

## 2024-07-10 RX ADMIN — INSULIN GLARGINE-YFGN 22 UNIT(S): 100 INJECTION, SOLUTION SUBCUTANEOUS at 09:01

## 2024-07-11 DIAGNOSIS — I10 ESSENTIAL (PRIMARY) HYPERTENSION: ICD-10-CM

## 2024-07-11 LAB
ANION GAP SERPL CALC-SCNC: 12 MMOL/L — SIGNIFICANT CHANGE UP (ref 5–17)
BASOPHILS # BLD AUTO: 0.03 K/UL — SIGNIFICANT CHANGE UP (ref 0–0.2)
BASOPHILS NFR BLD AUTO: 0.3 % — SIGNIFICANT CHANGE UP (ref 0–2)
BUN SERPL-MCNC: 30 MG/DL — HIGH (ref 7–23)
CALCIUM SERPL-MCNC: 9 MG/DL — SIGNIFICANT CHANGE UP (ref 8.4–10.5)
CHLORIDE SERPL-SCNC: 103 MMOL/L — SIGNIFICANT CHANGE UP (ref 96–108)
CO2 SERPL-SCNC: 23 MMOL/L — SIGNIFICANT CHANGE UP (ref 22–31)
CREAT SERPL-MCNC: 1.4 MG/DL — HIGH (ref 0.5–1.3)
EGFR: 53 ML/MIN/1.73M2 — LOW
EGFR: 53 ML/MIN/1.73M2 — LOW
EOSINOPHIL # BLD AUTO: 0.03 K/UL — SIGNIFICANT CHANGE UP (ref 0–0.5)
EOSINOPHIL NFR BLD AUTO: 0.3 % — SIGNIFICANT CHANGE UP (ref 0–6)
GAMMA INTERFERON BACKGROUND BLD IA-ACNC: 0.03 IU/ML — SIGNIFICANT CHANGE UP
GLUCOSE BLDC GLUCOMTR-MCNC: 107 MG/DL — HIGH (ref 70–99)
GLUCOSE BLDC GLUCOMTR-MCNC: 134 MG/DL — HIGH (ref 70–99)
GLUCOSE BLDC GLUCOMTR-MCNC: 143 MG/DL — HIGH (ref 70–99)
GLUCOSE BLDC GLUCOMTR-MCNC: 285 MG/DL — HIGH (ref 70–99)
GLUCOSE SERPL-MCNC: 127 MG/DL — HIGH (ref 70–99)
HCT VFR BLD CALC: 31 % — LOW (ref 39–50)
HGB BLD-MCNC: 10.5 G/DL — LOW (ref 13–17)
IMM GRANULOCYTES NFR BLD AUTO: 2.4 % — HIGH (ref 0–0.9)
LYMPHOCYTES # BLD AUTO: 0.81 K/UL — LOW (ref 1–3.3)
LYMPHOCYTES # BLD AUTO: 8.8 % — LOW (ref 13–44)
M TB IFN-G BLD-IMP: ABNORMAL
M TB IFN-G CD4+ BCKGRND COR BLD-ACNC: 0.04 IU/ML — SIGNIFICANT CHANGE UP
M TB IFN-G CD4+CD8+ BCKGRND COR BLD-ACNC: 0.02 IU/ML — SIGNIFICANT CHANGE UP
MCHC RBC-ENTMCNC: 29.7 PG — SIGNIFICANT CHANGE UP (ref 27–34)
MCHC RBC-ENTMCNC: 33.9 GM/DL — SIGNIFICANT CHANGE UP (ref 32–36)
MCV RBC AUTO: 87.6 FL — SIGNIFICANT CHANGE UP (ref 80–100)
MONOCYTES # BLD AUTO: 0.65 K/UL — SIGNIFICANT CHANGE UP (ref 0–0.9)
MONOCYTES NFR BLD AUTO: 7.1 % — SIGNIFICANT CHANGE UP (ref 2–14)
NEUTROPHILS # BLD AUTO: 7.42 K/UL — HIGH (ref 1.8–7.4)
NEUTROPHILS NFR BLD AUTO: 81.1 % — HIGH (ref 43–77)
NRBC # BLD: 0 /100 WBCS — SIGNIFICANT CHANGE UP (ref 0–0)
NRBC BLD-RTO: 0 /100 WBCS — SIGNIFICANT CHANGE UP (ref 0–0)
PLATELET # BLD AUTO: 419 K/UL — HIGH (ref 150–400)
POTASSIUM SERPL-MCNC: 3.8 MMOL/L — SIGNIFICANT CHANGE UP (ref 3.5–5.3)
POTASSIUM SERPL-SCNC: 3.8 MMOL/L — SIGNIFICANT CHANGE UP (ref 3.5–5.3)
QUANT TB PLUS MITOGEN MINUS NIL: 0.25 IU/ML — SIGNIFICANT CHANGE UP
RBC # BLD: 3.54 M/UL — LOW (ref 4.2–5.8)
RBC # FLD: 11.9 % — SIGNIFICANT CHANGE UP (ref 10.3–14.5)
SODIUM SERPL-SCNC: 138 MMOL/L — SIGNIFICANT CHANGE UP (ref 135–145)
WBC # BLD: 9.16 K/UL — SIGNIFICANT CHANGE UP (ref 3.8–10.5)
WBC # FLD AUTO: 9.16 K/UL — SIGNIFICANT CHANGE UP (ref 3.8–10.5)

## 2024-07-11 PROCEDURE — 99232 SBSQ HOSP IP/OBS MODERATE 35: CPT

## 2024-07-11 PROCEDURE — 99221 1ST HOSP IP/OBS SF/LOW 40: CPT

## 2024-07-11 RX ADMIN — ENOXAPARIN SODIUM 40 MILLIGRAM(S): 100 INJECTION SUBCUTANEOUS at 09:36

## 2024-07-11 RX ADMIN — TAMSULOSIN HYDROCHLORIDE 0.4 MILLIGRAM(S): 0.4 CAPSULE ORAL at 21:44

## 2024-07-11 RX ADMIN — Medication 100 MILLIGRAM(S): at 21:44

## 2024-07-11 RX ADMIN — INSULIN GLARGINE-YFGN 24 UNIT(S): 100 INJECTION, SOLUTION SUBCUTANEOUS at 07:58

## 2024-07-11 RX ADMIN — Medication 2 TABLET(S): at 21:44

## 2024-07-11 RX ADMIN — INSULIN LISPRO 11 UNIT(S): 100 INJECTION, SOLUTION INTRAVENOUS; SUBCUTANEOUS at 12:52

## 2024-07-11 RX ADMIN — Medication 1 APPLICATION(S): at 07:58

## 2024-07-11 RX ADMIN — Medication 100 MILLIGRAM(S): at 05:26

## 2024-07-11 RX ADMIN — INSULIN LISPRO 2: 100 INJECTION, SOLUTION INTRAVENOUS; SUBCUTANEOUS at 21:49

## 2024-07-11 RX ADMIN — INSULIN LISPRO 11 UNIT(S): 100 INJECTION, SOLUTION INTRAVENOUS; SUBCUTANEOUS at 17:46

## 2024-07-11 RX ADMIN — Medication 100 MILLIGRAM(S): at 17:43

## 2024-07-11 RX ADMIN — INSULIN LISPRO 11 UNIT(S): 100 INJECTION, SOLUTION INTRAVENOUS; SUBCUTANEOUS at 07:57

## 2024-07-12 LAB
ANION GAP SERPL CALC-SCNC: 13 MMOL/L — SIGNIFICANT CHANGE UP (ref 5–17)
BUN SERPL-MCNC: 30 MG/DL — HIGH (ref 7–23)
CALCIUM SERPL-MCNC: 9 MG/DL — SIGNIFICANT CHANGE UP (ref 8.4–10.5)
CHLORIDE SERPL-SCNC: 101 MMOL/L — SIGNIFICANT CHANGE UP (ref 96–108)
CO2 SERPL-SCNC: 22 MMOL/L — SIGNIFICANT CHANGE UP (ref 22–31)
CREAT SERPL-MCNC: 1.41 MG/DL — HIGH (ref 0.5–1.3)
CULTURE RESULTS: ABNORMAL
CULTURE RESULTS: SIGNIFICANT CHANGE UP
CULTURE RESULTS: SIGNIFICANT CHANGE UP
EGFR: 53 ML/MIN/1.73M2 — LOW
EGFR: 53 ML/MIN/1.73M2 — LOW
GLUCOSE BLDC GLUCOMTR-MCNC: 127 MG/DL — HIGH (ref 70–99)
GLUCOSE BLDC GLUCOMTR-MCNC: 127 MG/DL — HIGH (ref 70–99)
GLUCOSE BLDC GLUCOMTR-MCNC: 156 MG/DL — HIGH (ref 70–99)
GLUCOSE BLDC GLUCOMTR-MCNC: 201 MG/DL — HIGH (ref 70–99)
GLUCOSE BLDC GLUCOMTR-MCNC: 60 MG/DL — LOW (ref 70–99)
GLUCOSE SERPL-MCNC: 219 MG/DL — HIGH (ref 70–99)
HCT VFR BLD CALC: 31.4 % — LOW (ref 39–50)
HGB BLD-MCNC: 10.3 G/DL — LOW (ref 13–17)
MCHC RBC-ENTMCNC: 29.3 PG — SIGNIFICANT CHANGE UP (ref 27–34)
MCHC RBC-ENTMCNC: 32.8 GM/DL — SIGNIFICANT CHANGE UP (ref 32–36)
MCV RBC AUTO: 89.2 FL — SIGNIFICANT CHANGE UP (ref 80–100)
NRBC # BLD: 0 /100 WBCS — SIGNIFICANT CHANGE UP (ref 0–0)
NRBC BLD-RTO: 0 /100 WBCS — SIGNIFICANT CHANGE UP (ref 0–0)
ORGANISM # SPEC MICROSCOPIC CNT: ABNORMAL
ORGANISM # SPEC MICROSCOPIC CNT: ABNORMAL
PLATELET # BLD AUTO: 430 K/UL — HIGH (ref 150–400)
POTASSIUM SERPL-MCNC: 3.9 MMOL/L — SIGNIFICANT CHANGE UP (ref 3.5–5.3)
POTASSIUM SERPL-SCNC: 3.9 MMOL/L — SIGNIFICANT CHANGE UP (ref 3.5–5.3)
RBC # BLD: 3.52 M/UL — LOW (ref 4.2–5.8)
RBC # FLD: 12 % — SIGNIFICANT CHANGE UP (ref 10.3–14.5)
SODIUM SERPL-SCNC: 136 MMOL/L — SIGNIFICANT CHANGE UP (ref 135–145)
SPECIMEN SOURCE: SIGNIFICANT CHANGE UP
WBC # BLD: 7.98 K/UL — SIGNIFICANT CHANGE UP (ref 3.8–10.5)
WBC # FLD AUTO: 7.98 K/UL — SIGNIFICANT CHANGE UP (ref 3.8–10.5)

## 2024-07-12 PROCEDURE — 71045 X-RAY EXAM CHEST 1 VIEW: CPT | Mod: 26

## 2024-07-12 PROCEDURE — 99232 SBSQ HOSP IP/OBS MODERATE 35: CPT

## 2024-07-12 RX ADMIN — Medication 100 MILLIGRAM(S): at 05:38

## 2024-07-12 RX ADMIN — INSULIN LISPRO 11 UNIT(S): 100 INJECTION, SOLUTION INTRAVENOUS; SUBCUTANEOUS at 12:11

## 2024-07-12 RX ADMIN — Medication 100 MILLIGRAM(S): at 14:26

## 2024-07-12 RX ADMIN — INSULIN LISPRO 11 UNIT(S): 100 INJECTION, SOLUTION INTRAVENOUS; SUBCUTANEOUS at 17:17

## 2024-07-12 RX ADMIN — TAMSULOSIN HYDROCHLORIDE 0.4 MILLIGRAM(S): 0.4 CAPSULE ORAL at 21:41

## 2024-07-12 RX ADMIN — ENOXAPARIN SODIUM 40 MILLIGRAM(S): 100 INJECTION SUBCUTANEOUS at 12:12

## 2024-07-12 RX ADMIN — Medication 1 APPLICATION(S): at 08:03

## 2024-07-12 RX ADMIN — INSULIN LISPRO 2: 100 INJECTION, SOLUTION INTRAVENOUS; SUBCUTANEOUS at 17:17

## 2024-07-12 RX ADMIN — INSULIN LISPRO 11 UNIT(S): 100 INJECTION, SOLUTION INTRAVENOUS; SUBCUTANEOUS at 08:04

## 2024-07-12 RX ADMIN — INSULIN GLARGINE-YFGN 24 UNIT(S): 100 INJECTION, SOLUTION SUBCUTANEOUS at 08:03

## 2024-07-12 RX ADMIN — INSULIN LISPRO 4: 100 INJECTION, SOLUTION INTRAVENOUS; SUBCUTANEOUS at 08:04

## 2024-07-12 RX ADMIN — Medication 2 TABLET(S): at 21:40

## 2024-07-12 RX ADMIN — Medication 100 MILLIGRAM(S): at 21:41

## 2024-07-13 ENCOUNTER — TRANSCRIPTION ENCOUNTER (OUTPATIENT)
Age: 74
End: 2024-07-13

## 2024-07-13 VITALS
OXYGEN SATURATION: 98 % | SYSTOLIC BLOOD PRESSURE: 123 MMHG | DIASTOLIC BLOOD PRESSURE: 75 MMHG | RESPIRATION RATE: 18 BRPM | HEART RATE: 70 BPM | TEMPERATURE: 98 F

## 2024-07-13 LAB
GLUCOSE BLDC GLUCOMTR-MCNC: 142 MG/DL — HIGH (ref 70–99)
GLUCOSE BLDC GLUCOMTR-MCNC: 99 MG/DL — SIGNIFICANT CHANGE UP (ref 70–99)

## 2024-07-13 RX ORDER — TAMSULOSIN HYDROCHLORIDE 0.4 MG/1
1 CAPSULE ORAL
Qty: 30 | Refills: 0
Start: 2024-07-13 | End: 2024-08-11

## 2024-07-13 RX ORDER — METFORMIN HYDROCHLORIDE 850 MG/1
1 TABLET ORAL
Qty: 60 | Refills: 0
Start: 2024-07-13 | End: 2024-08-11

## 2024-07-13 RX ORDER — GLYBURIDE AND METFORMIN HYDROCHLORIDE 5; 500 MG/1; MG/1
1 TABLET, FILM COATED ORAL
Refills: 0 | DISCHARGE

## 2024-07-13 RX ORDER — INSULIN GLARGINE-YFGN 100 [IU]/ML
24 INJECTION, SOLUTION SUBCUTANEOUS
Qty: 1 | Refills: 0
Start: 2024-07-13 | End: 2024-08-11

## 2024-07-13 RX ORDER — SENNA 187 MG
2 TABLET ORAL
Qty: 60 | Refills: 0
Start: 2024-07-13 | End: 2024-08-11

## 2024-07-13 RX ORDER — ISOPROPYL ALCOHOL, BENZOCAINE .7; .06 ML/ML; ML/ML
0 SWAB TOPICAL
Qty: 100 | Refills: 1
Start: 2024-07-13

## 2024-07-13 RX ORDER — INSULIN GLARGINE 100 [IU]/ML
19 INJECTION, SOLUTION SUBCUTANEOUS
Qty: 1 | Refills: 0 | DISCHARGE
Start: 2024-07-13 | End: 2024-08-11

## 2024-07-13 RX ADMIN — Medication 1 APPLICATION(S): at 08:14

## 2024-07-13 RX ADMIN — INSULIN GLARGINE-YFGN 24 UNIT(S): 100 INJECTION, SOLUTION SUBCUTANEOUS at 08:23

## 2024-07-13 RX ADMIN — INSULIN LISPRO 11 UNIT(S): 100 INJECTION, SOLUTION INTRAVENOUS; SUBCUTANEOUS at 11:40

## 2024-07-13 RX ADMIN — ENOXAPARIN SODIUM 40 MILLIGRAM(S): 100 INJECTION SUBCUTANEOUS at 11:41

## 2024-07-13 RX ADMIN — INSULIN LISPRO 11 UNIT(S): 100 INJECTION, SOLUTION INTRAVENOUS; SUBCUTANEOUS at 08:18

## 2024-07-13 RX ADMIN — Medication 100 MILLIGRAM(S): at 05:11

## 2024-07-15 ENCOUNTER — NON-APPOINTMENT (OUTPATIENT)
Age: 74
End: 2024-07-15

## 2024-07-15 PROBLEM — I10 ESSENTIAL (PRIMARY) HYPERTENSION: Chronic | Status: ACTIVE | Noted: 2024-07-06

## 2024-07-15 PROBLEM — Z00.00 ENCOUNTER FOR PREVENTIVE HEALTH EXAMINATION: Status: ACTIVE | Noted: 2024-07-15

## 2024-07-15 PROBLEM — N20.0 CALCULUS OF KIDNEY: Chronic | Status: ACTIVE | Noted: 2024-07-06

## 2024-07-15 PROBLEM — N40.0 BENIGN PROSTATIC HYPERPLASIA WITHOUT LOWER URINARY TRACT SYMPTOMS: Chronic | Status: ACTIVE | Noted: 2024-07-06

## 2024-07-15 PROCEDURE — 36569 INSJ PICC 5 YR+ W/O IMAGING: CPT

## 2024-07-15 PROCEDURE — 84132 ASSAY OF SERUM POTASSIUM: CPT

## 2024-07-15 PROCEDURE — C1751: CPT

## 2024-07-15 PROCEDURE — 80053 COMPREHEN METABOLIC PANEL: CPT

## 2024-07-15 PROCEDURE — 85730 THROMBOPLASTIN TIME PARTIAL: CPT

## 2024-07-15 PROCEDURE — 85610 PROTHROMBIN TIME: CPT

## 2024-07-15 PROCEDURE — 85018 HEMOGLOBIN: CPT

## 2024-07-15 PROCEDURE — 83880 ASSAY OF NATRIURETIC PEPTIDE: CPT

## 2024-07-15 PROCEDURE — 87205 SMEAR GRAM STAIN: CPT

## 2024-07-15 PROCEDURE — 85014 HEMATOCRIT: CPT

## 2024-07-15 PROCEDURE — 82010 KETONE BODYS QUAN: CPT

## 2024-07-15 PROCEDURE — 93356 MYOCRD STRAIN IMG SPCKL TRCK: CPT

## 2024-07-15 PROCEDURE — 84100 ASSAY OF PHOSPHORUS: CPT

## 2024-07-15 PROCEDURE — 99285 EMERGENCY DEPT VISIT HI MDM: CPT | Mod: 25

## 2024-07-15 PROCEDURE — 86480 TB TEST CELL IMMUN MEASURE: CPT

## 2024-07-15 PROCEDURE — 82330 ASSAY OF CALCIUM: CPT

## 2024-07-15 PROCEDURE — 97162 PT EVAL MOD COMPLEX 30 MIN: CPT

## 2024-07-15 PROCEDURE — 71045 X-RAY EXAM CHEST 1 VIEW: CPT

## 2024-07-15 PROCEDURE — 85025 COMPLETE CBC W/AUTO DIFF WBC: CPT

## 2024-07-15 PROCEDURE — 81001 URINALYSIS AUTO W/SCOPE: CPT

## 2024-07-15 PROCEDURE — 93306 TTE W/DOPPLER COMPLETE: CPT

## 2024-07-15 PROCEDURE — 87040 BLOOD CULTURE FOR BACTERIA: CPT

## 2024-07-15 PROCEDURE — 87077 CULTURE AEROBIC IDENTIFY: CPT

## 2024-07-15 PROCEDURE — 76000 FLUOROSCOPY <1 HR PHYS/QHP: CPT

## 2024-07-15 PROCEDURE — 80048 BASIC METABOLIC PNL TOTAL CA: CPT

## 2024-07-15 PROCEDURE — 76376 3D RENDER W/INTRP POSTPROCES: CPT

## 2024-07-15 PROCEDURE — 76770 US EXAM ABDO BACK WALL COMP: CPT

## 2024-07-15 PROCEDURE — 87186 SC STD MICRODIL/AGAR DIL: CPT

## 2024-07-15 PROCEDURE — 86850 RBC ANTIBODY SCREEN: CPT

## 2024-07-15 PROCEDURE — 84484 ASSAY OF TROPONIN QUANT: CPT

## 2024-07-15 PROCEDURE — 82962 GLUCOSE BLOOD TEST: CPT

## 2024-07-15 PROCEDURE — 74176 CT ABD & PELVIS W/O CONTRAST: CPT | Mod: MC

## 2024-07-15 PROCEDURE — 84295 ASSAY OF SERUM SODIUM: CPT

## 2024-07-15 PROCEDURE — 83735 ASSAY OF MAGNESIUM: CPT

## 2024-07-15 PROCEDURE — C1894: CPT

## 2024-07-15 PROCEDURE — 87150 DNA/RNA AMPLIFIED PROBE: CPT

## 2024-07-15 PROCEDURE — 87086 URINE CULTURE/COLONY COUNT: CPT

## 2024-07-15 PROCEDURE — C2617: CPT

## 2024-07-15 PROCEDURE — C1887: CPT

## 2024-07-15 PROCEDURE — C1729: CPT

## 2024-07-15 PROCEDURE — 85652 RBC SED RATE AUTOMATED: CPT

## 2024-07-15 PROCEDURE — C1769: CPT

## 2024-07-15 PROCEDURE — 83605 ASSAY OF LACTIC ACID: CPT

## 2024-07-15 PROCEDURE — C9399: CPT

## 2024-07-15 PROCEDURE — 96374 THER/PROPH/DIAG INJ IV PUSH: CPT

## 2024-07-15 PROCEDURE — 87070 CULTURE OTHR SPECIMN AEROBIC: CPT

## 2024-07-15 PROCEDURE — 86901 BLOOD TYPING SEROLOGIC RH(D): CPT

## 2024-07-15 PROCEDURE — 97530 THERAPEUTIC ACTIVITIES: CPT

## 2024-07-15 PROCEDURE — 85027 COMPLETE CBC AUTOMATED: CPT

## 2024-07-15 PROCEDURE — C1758: CPT

## 2024-07-15 PROCEDURE — 96375 TX/PRO/DX INJ NEW DRUG ADDON: CPT

## 2024-07-15 PROCEDURE — 97116 GAIT TRAINING THERAPY: CPT

## 2024-07-15 PROCEDURE — 50432 PLMT NEPHROSTOMY CATHETER: CPT

## 2024-07-15 PROCEDURE — 86140 C-REACTIVE PROTEIN: CPT

## 2024-07-15 PROCEDURE — 97165 OT EVAL LOW COMPLEX 30 MIN: CPT

## 2024-07-15 PROCEDURE — 82435 ASSAY OF BLOOD CHLORIDE: CPT

## 2024-07-15 PROCEDURE — 82947 ASSAY GLUCOSE BLOOD QUANT: CPT

## 2024-07-15 PROCEDURE — 86900 BLOOD TYPING SEROLOGIC ABO: CPT

## 2024-07-15 PROCEDURE — 83036 HEMOGLOBIN GLYCOSYLATED A1C: CPT

## 2024-07-15 PROCEDURE — 71250 CT THORAX DX C-: CPT | Mod: MC

## 2024-07-15 PROCEDURE — 82803 BLOOD GASES ANY COMBINATION: CPT

## 2024-07-17 ENCOUNTER — NON-APPOINTMENT (OUTPATIENT)
Age: 74
End: 2024-07-17

## 2024-07-21 ENCOUNTER — NON-APPOINTMENT (OUTPATIENT)
Age: 74
End: 2024-07-21

## 2024-07-22 ENCOUNTER — APPOINTMENT (OUTPATIENT)
Dept: UROLOGY | Facility: CLINIC | Age: 74
End: 2024-07-22
Payer: MEDICARE

## 2024-07-22 VITALS
DIASTOLIC BLOOD PRESSURE: 73 MMHG | HEIGHT: 72.5 IN | WEIGHT: 150 LBS | HEART RATE: 90 BPM | BODY MASS INDEX: 20.1 KG/M2 | SYSTOLIC BLOOD PRESSURE: 138 MMHG | RESPIRATION RATE: 17 BRPM | TEMPERATURE: 97.9 F

## 2024-07-22 DIAGNOSIS — Z80.42 FAMILY HISTORY OF MALIGNANT NEOPLASM OF PROSTATE: ICD-10-CM

## 2024-07-22 DIAGNOSIS — Z84.1 FAMILY HISTORY OF DISORDERS OF KIDNEY AND URETER: ICD-10-CM

## 2024-07-22 PROBLEM — N20.0 NEPHROLITHIASIS: Status: ACTIVE | Noted: 2024-07-22

## 2024-07-22 PROCEDURE — 99214 OFFICE O/P EST MOD 30 MIN: CPT

## 2024-07-22 NOTE — ASSESSMENT
[FreeTextEntry1] : Call Daughter Dr. Caryn Tineo (Anesthesiology) for scheduling surgery dates   Options discussed Best treated with bilateral PCNL, one kidney at a time Can be done during the same hospitalization Potential complications of bleeding, transfusion, selective angioembolization if indicated, adjacent organ injury, fever, sepsis, infection, incomplete stone clearance, bowel or other unusual injury, chest complications, vascular injuries, procedures needed to address any complications, ureteral injury, anesthetic complications, all discussed.  Keep nephrostomy tube open Will need antibiotics preoperatively as well

## 2024-07-22 NOTE — HISTORY OF PRESENT ILLNESS
[FreeTextEntry1] : Call Daughter Dr. Caryn Tineo (Anesthesiology) for scheduling surgery dates  72y/o man accompanied by wife and son failed attempt at left ureteral stent insertion requiring left nephrostomy tube insertion. admitted to SICU for hemodynamic monitoring out of concern for urosepsis. Patient on IV pressors then downgraded to medicine unit once stable. Bates catheter removed 7/11/2024 per urology team. Patient bacteremic with MSSA, infectious disease followed and patient on IV Ancef. Patient will continue IV ancef until 8/18/2024. Patient found to have a blood sugar of >600. Endocrinology followed and managed.   Surgical hx/Medical hx: DM2, HTN, kidney stones,  Allergies: NKDA Social: Alcohol-, Smoking-, Drug-, Occupation Family hx: non-contributory Medications: Cefazolin 2000mg intravenously every 8 hours for 6 weeks (end date: 8/18/2024). Lantus 24 units daily,  Metformin 1000mg BID.

## 2024-07-24 ENCOUNTER — APPOINTMENT (OUTPATIENT)
Dept: UROLOGY | Facility: CLINIC | Age: 74
End: 2024-07-24

## 2024-07-24 ENCOUNTER — APPOINTMENT (OUTPATIENT)
Dept: ENDOCRINOLOGY | Facility: CLINIC | Age: 74
End: 2024-07-24
Payer: MEDICARE

## 2024-07-24 VITALS
DIASTOLIC BLOOD PRESSURE: 80 MMHG | BODY MASS INDEX: 20.1 KG/M2 | HEART RATE: 75 BPM | WEIGHT: 150 LBS | OXYGEN SATURATION: 98 % | HEIGHT: 72.5 IN | SYSTOLIC BLOOD PRESSURE: 130 MMHG

## 2024-07-24 DIAGNOSIS — Z83.3 FAMILY HISTORY OF DIABETES MELLITUS: ICD-10-CM

## 2024-07-24 DIAGNOSIS — Z87.891 PERSONAL HISTORY OF NICOTINE DEPENDENCE: ICD-10-CM

## 2024-07-24 PROCEDURE — 99204 OFFICE O/P NEW MOD 45 MIN: CPT

## 2024-07-24 RX ORDER — METFORMIN HYDROCHLORIDE 1000 MG/1
1000 TABLET, COATED ORAL
Qty: 180 | Refills: 0 | Status: ACTIVE | COMMUNITY
Start: 2024-07-24 | End: 1900-01-01

## 2024-07-24 RX ORDER — BLOOD SUGAR DIAGNOSTIC
STRIP MISCELLANEOUS
Qty: 100 | Refills: 0 | Status: ACTIVE | COMMUNITY
Start: 2024-07-24 | End: 1900-01-01

## 2024-07-24 RX ORDER — INSULIN GLARGINE 100 [IU]/ML
100 INJECTION, SOLUTION SUBCUTANEOUS DAILY
Qty: 2 | Refills: 0 | Status: ACTIVE | COMMUNITY
Start: 2024-07-24 | End: 1900-01-01

## 2024-07-24 NOTE — PHYSICAL EXAM
[Alert] : alert [No Acute Distress] : no acute distress [Well Developed] : well developed [Normal Sclera/Conjunctiva] : normal sclera/conjunctiva [No Proptosis] : no proptosis [Supple] : the neck was supple [Thyroid Not Enlarged] : the thyroid was not enlarged [No Respiratory Distress] : no respiratory distress [No Accessory Muscle Use] : no accessory muscle use [Normal Rate and Effort] : normal respiratory rate and effort [Clear to Auscultation] : lungs were clear to auscultation bilaterally [Normal S1, S2] : normal S1 and S2 [Normal Rate] : heart rate was normal [Regular Rhythm] : with a regular rhythm [No Edema] : no peripheral edema [Not Tender] : non-tender [Not Distended] : not distended [Soft] : abdomen soft [No Stigmata of Cushings Syndrome] : no stigmata of Cushings Syndrome [Normal Gait] : normal gait [No Involuntary Movements] : no involuntary movements were seen [No Tremors] : no tremors [Oriented x3] : oriented to person, place, and time [Normal Affect] : the affect was normal

## 2024-07-24 NOTE — REVIEW OF SYSTEMS
[Recent Weight Loss (___ Lbs)] : recent weight loss: [unfilled] lbs [Eye Pain] : no pain [Blurred Vision] : no blurred vision [Dysphagia] : no dysphagia [Neck Pain] : no neck pain [Dysphonia] : no dysphonia [Chest Pain] : no chest pain [Palpitations] : no palpitations [Shortness Of Breath] : no shortness of breath [Nausea] : no nausea [Abdominal Pain] : no abdominal pain [Vomiting] : no vomiting [As Noted in HPI] : as noted in HPI [Polyuria] : polyuria [Pain/Numbness of Digits] : no pain/numbness of digits [Polydipsia] : no polydipsia

## 2024-07-24 NOTE — ASSESSMENT
[FreeTextEntry1] : The patient is a 73 year old male being seen in the office today for evaluation of diabetes. PMH of T2DM. Recent admission to the hospital with urosepsis due to L obstructive kidney stone and bacteremia. S/p failed stenting with urology. S/p Percutaneous nephrostomy tube L with IR. Found to be in HHS on arrival, briefly on insulin gtt, transitioned to basal/bolus insulin. Endocrinology was consulted. Still on IV antibiotics.   T2DM - A1c: 10.7% 7/2024 Current home regimen: Lantus 24 units every morning Metformin 1000mg twice daily - Plan: Reduce Lantus to 19 units once a day, continue Metformin (will need to monitor GFR, last GFR 57 7/2024) - BG monitoring: Check BG 2-3 times a day AM fasting and staggered before/after meals and bedtime  - Labs: will obtain c-peptide, lipid profile, will hold off on Urine ACR until s/p IV treatment for urosepsis is complete - Retinopathy screening: overdue, recommend yearly dilated eye exam  - Counseling: We discussed diabetes foot care, long term complications of diabetes including but not limited to neuropathy, nephropathy, retinopathy and cardiovascular disease and the benefits of good glycemic control in preventing said complications. We discussed the risks and benefits of diabetes medications and/or insulin as relevant for today, prevention and management of hypoglycemia, importance of medication compliance and blood glucose monitoring. - Discussed diabetic diet, decreased intake of simple/processed sugars, increase intake of whole foods with protein and fiber. Increase physical activity as tolerated with cardiovascular exercise 150 min/per week consistently and resistance exercise three days per week.  - I discussed the importance of avoiding mild hypoglycemia (FS<70 mg/dl) and severe hypoglycemia (FS<55 mg/dl) with the patient. I also discussed hypoglycemia correction with 4 oz of juice or 4 glucose tablets and rechecking FS in 15 minutes. If FS is still low, repeat 4oz juice or 4 glucose tablets and consume 12 grams of carbohydrates.  Recommend CDE evaluation 2 month follow up with me Has appointment with Dr. Gracia scheduled for 2/2025

## 2024-07-24 NOTE — HISTORY OF PRESENT ILLNESS
[FreeTextEntry1] : The patient is a 73 year old male being seen in the office today for evaluation of diabetes. PMH of T2DM. Recent admission to the hospital with urosepsis due to L obstructive kidney stone and bacteremia. S/p failed stenting with urology. S/p Percutaneous nephrostomy tube L with IR. Found to be in HHS on arrival, briefly on insulin gtt, transitioned to basal/bolus insulin. Endocrinology was consulted. Still on IV antibiotics. Patient accompanied by his daughter for office visit.  T2DM dx 15 years ago  A1c: 10.7% 7/2024  Current home regimen: Lantus 24 units every morning Metformin 1000mg twice daily Past medications: Glyburide-Metformin 5mg-500mg BID Compliance: yes   Diabetes complications: Microvascular: [-] neuropathy, [-] retinopathy Macrovascular: [-] CAD, [-] CVA, [-] PAD Patient unsure of his baseline GFR, last GFR 57 History of HHS once as above, denies dka  Last retinopathy screening: has not seen optho for many years, denies retinopathy  Denies active issues with feet   Fingersticks 3x/day, log book shows:  BG Trends: - Before Breakfast: 178, 189, 111, 60, 74, 74, 63, 80 , 69 After lunch (states he checks his BG 1-2 hours after eating lunch): 281, 166, 200, 153, 98, 99, 100, 173 After dinner (states he checks his BG 1-2 hours after eating dinner): 163, 141, 90, 73, 157, 96, 167, 185  Hypoglycemia events: as above, has symptoms: weakness, corrects with juice  Diet: was eating carb heavy previously, eats rice with all 3 meals but has greatly reduced his rice intake, increased veggie intake Snacks: blueberries, melon Exercise: walks 5 miles a day  Steroid intake: denies  Family history of diabetes: daughter with gestational diabetes, paternal uncle  Social history: lives with family, retired, former smoker, no etoh intake   Denies blurry vision or polydypsia, + polyuria but states has improved

## 2024-07-26 ENCOUNTER — APPOINTMENT (OUTPATIENT)
Dept: INFECTIOUS DISEASE | Facility: CLINIC | Age: 74
End: 2024-07-26
Payer: MEDICARE

## 2024-07-26 VITALS
OXYGEN SATURATION: 100 % | TEMPERATURE: 98.2 F | SYSTOLIC BLOOD PRESSURE: 126 MMHG | DIASTOLIC BLOOD PRESSURE: 87 MMHG | BODY MASS INDEX: 18.35 KG/M2 | HEART RATE: 86 BPM | HEIGHT: 72.5 IN | WEIGHT: 137 LBS

## 2024-07-26 DIAGNOSIS — R78.81 BACTEREMIA: ICD-10-CM

## 2024-07-26 DIAGNOSIS — E11.9 TYPE 2 DIABETES MELLITUS W/OUT COMPLICATIONS: ICD-10-CM

## 2024-07-26 DIAGNOSIS — N20.0 CALCULUS OF KIDNEY: ICD-10-CM

## 2024-07-26 DIAGNOSIS — B95.61 BACTEREMIA: ICD-10-CM

## 2024-07-26 DIAGNOSIS — Z86.11 PERSONAL HISTORY OF TUBERCULOSIS: ICD-10-CM

## 2024-07-26 PROCEDURE — 99214 OFFICE O/P EST MOD 30 MIN: CPT

## 2024-07-26 RX ORDER — TAMSULOSIN HYDROCHLORIDE 0.4 MG/1
0.4 CAPSULE ORAL
Refills: 0 | Status: ACTIVE | COMMUNITY

## 2024-07-27 PROBLEM — R78.81 MSSA BACTEREMIA: Status: ACTIVE | Noted: 2024-07-27

## 2024-07-27 PROBLEM — E11.9 T2DM (TYPE 2 DIABETES MELLITUS): Status: ACTIVE | Noted: 2024-07-24

## 2024-07-28 NOTE — ASSESSMENT
[FreeTextEntry1] : daughter is juan a franks- will make the appoitnmetn [phone] 74y/o man accompanied by wife  failed attempt at left ureteral stent insertion requiring left nephrostomy tube insertion. admitted to SICU for hemodynamic monitoring out of concern for urosepsis. Patient on IV pressors then downgraded to medicine unit once stable. Bates catheter removed 7/11/2024 per urology team. Patient bacteremic with MSSA,d and patient on IV Ancef. Patient will continue IV ancef until 8/18/2024. Patient found to have a blood sugar of >600. Endocrinology followed and managed.  Unusual to grow SA from a UTI Pt was bacteremic pt with a soft murmur - noted in hospital  TTE with no vegetations bacteremia cleared rapidly  will treat for 6 weeks and then do surveillnace cultures when done pt will need antibioitcs at the time of the urology procedure . will coordinate with urology how to best address. Does pt need to stay on po abs , after the IV pending procedure?  will discuss options with urology    TERESITA Rivas M.D., FACP Chief, Division of Infectious Diseases Professor of Medicine [employer]  at [facility] [phone] (Phone) [phone]) email : [email or web address]

## 2024-07-28 NOTE — REVIEW OF SYSTEMS
[Nocturia] : nocturia [Negative] : Heme/Lymph [Fever] : no fever [Chills] : no chills [Chest Pain] : no chest pain [Diarrhea] : no diarrhea [FreeTextEntry3] : glasses

## 2024-07-28 NOTE — DATA REVIEWED
[FreeTextEntry1] :  < from: TTE W or WO Ultrasound Enhancing Agent (07.09.24 @ 10:27) > CONCLUSIONS:    1. Left ventricular cavity is normal in size. Left ventricular wall thickness is normal. Left ventricular systolic function is normal with anejection fraction of 62 % by 3D. There are no regional wall motion abnormalities seen.  2. The left ventricular diastolic function is indeterminate.  3. Normal right ventricular cavity size, with normal wall thickness, and normal right ventricular systolic function.  4. No pericardial effusion seen.  5. Estimated pulmonary artery systolic pressure is 35 mmHg.  6. The inferior vena cava is normal in size (normal <2.1cm) with normal inspiratory collapse (normal >50%) consistent with normal right atrial pressure (~3, range 0-5mmHg).  7. No prior echocardiogram is available for comparison.  8. Unable to rule out endocarditis.  9. There is normal LV mass and normal geometry.  < end of copied text >

## 2024-07-28 NOTE — PHYSICAL EXAM
[General Appearance - Alert] : alert [General Appearance - In No Acute Distress] : in no acute distress [Sclera] : the sclera and conjunctiva were normal [PERRL With Normal Accommodation] : pupils were equal in size, round, reactive to light [Extraocular Movements] : extraocular movements were intact [Outer Ear] : the ears and nose were normal in appearance [Oropharynx] : the oropharynx was normal with no thrush [Neck Appearance] : the appearance of the neck was normal [Neck Cervical Mass (___cm)] : no neck mass was observed [Auscultation Breath Sounds / Voice Sounds] : lungs were clear to auscultation bilaterally [Heart Sounds] : normal S1 and S2 [Full Pulse] : the pedal pulses are present [Edema] : there was no peripheral edema [Bowel Sounds] : normal bowel sounds [Abdomen Soft] : soft [Abdomen Tenderness] : non-tender [No Palpable Adenopathy] : no palpable adenopathy [Musculoskeletal - Swelling] : no joint swelling [Nail Clubbing] : no clubbing  or cyanosis of the fingernails [Motor Tone] : muscle strength and tone were normal [Skin Color & Pigmentation] : normal skin color and pigmentation [] : no rash [Deep Tendon Reflexes (DTR)] : deep tendon reflexes were 2+ and symmetric [Sensation] : the sensory exam was normal to light touch and pinprick [Motor Exam] : the motor exam was normal [No Focal Deficits] : no focal deficits [Oriented To Time, Place, And Person] : oriented to person, place, and time [Affect] : the affect was normal [FreeTextEntry1] : 1-2/6 murmur  unchanged ( unchanged)

## 2024-07-28 NOTE — HISTORY OF PRESENT ILLNESS
[FreeTextEntry1] : Hospital Course: Discharge Date 13-Jul-2024 Admission Date 06-Jul-2024 18:52 Reason for Admission Left Septic Stone and Hyperglycemia Hospital Course  HPI: 73-year-old male with history of diabetes, high blood pressure, kidney stones presenting with concern for left-sided flank pain.    Hospital Course: CT imaging showing: Obstructing left proximal ureteral stone 1.3 cm with moderate hydronephrosis. Patient taken to OR for emergent stenting, intra op difficulty with placing stent. IR c/s for perc nephrostomy tube, patient admitted to SICU for hemodynamic monitoring out of concern for urosepsis. Patient on IV pressors then downgraded to medicine unit once stable. Bates catheter removed 7/11/2024 per urology team.  Patient bacteremic with MSSA, infectious disease following and patient on IV Ancef. Patient will continue IV ancef until 8/18/2024.  Patient found to have a blood sugar of >600. Endocrinology following.   Important Medication Changes and Reason: New medication: Cefazolin 2000mg intravenously every 8 hours for 6 weeks (end date: 8/18/2024). Endocrinology recommendations: Lantus 24 units daily and Metformin 1000mg BID.    Active or Pending Issues Requiring Follow-up: Follow-up with primary care doctor within one week.  Follow-up with infectious disease doctor, Dr. Velasco. Patient needs to have weekly lab work done and faxed to Dr. Velasco office. Follow-up with urology,  PAtient will require outpatient follow-up with either Dr. Antunez, Dr. Hoenig or Dr. Cantu for definitive stone management.  Follow-up with endocrinologist.  Regarding outpatient follow up with IR, if the patient is d/c home with drainage catheter they can make an appointment with IR by calling the IR booking office at [phone]; recommend IR follow in 3 months for tube evaluation and exchange.  Advanced Directives:  [X] Full code  [ ] DNR  [ ] Hospice [id number]- [place] interpretor   pt is feeling well  fine overall pt is getting antibiotic [id number] /day no diarrhea- only first day of discharge no fevers, chills or night sweats  keeps IV ;line dry no flank pain  no blood in urine Plan is for  PICC for 6 weeks of iv abx  plan is for abx until 8/18/24, cbc, cmp once a week while on abx.   internist is  Marjorie Simpson [facility]  [phone]  also a  diabetic  was supposed to be on a tour of the [place] for [place] days and got sick had been touring  got sick when in [place] and had to come home    10 years ago, saw cousin in [place]- urologist historically whenever pain in his side he would exercise, and it would get better , didn't help this time

## 2024-07-28 NOTE — HISTORY OF PRESENT ILLNESS
[FreeTextEntry1] : Hospital Course: Discharge Date 13-Jul-2024 Admission Date 06-Jul-2024 18:52 Reason for Admission Left Septic Stone and Hyperglycemia Hospital Course  HPI: 73-year-old male with history of diabetes, high blood pressure, kidney stones presenting with concern for left-sided flank pain.    Hospital Course: CT imaging showing: Obstructing left proximal ureteral stone 1.3 cm with moderate hydronephrosis. Patient taken to OR for emergent stenting, intra op difficulty with placing stent. IR c/s for perc nephrostomy tube, patient admitted to SICU for hemodynamic monitoring out of concern for urosepsis. Patient on IV pressors then downgraded to medicine unit once stable. Bates catheter removed 7/11/2024 per urology team.  Patient bacteremic with MSSA, infectious disease following and patient on IV Ancef. Patient will continue IV ancef until 8/18/2024.  Patient found to have a blood sugar of >600. Endocrinology following.   Important Medication Changes and Reason: New medication: Cefazolin 2000mg intravenously every 8 hours for 6 weeks (end date: 8/18/2024). Endocrinology recommendations: Lantus 24 units daily and Metformin 1000mg BID.    Active or Pending Issues Requiring Follow-up: Follow-up with primary care doctor within one week.  Follow-up with infectious disease doctor, Dr. Velasco. Patient needs to have weekly lab work done and faxed to Dr. Velasco office. Follow-up with urology,  PAtient will require outpatient follow-up with either Dr. Antunez, Dr. Hoenig or Dr. Cantu for definitive stone management.  Follow-up with endocrinologist.  Regarding outpatient follow up with IR, if the patient is d/c home with drainage catheter they can make an appointment with IR by calling the IR booking office at [phone]; recommend IR follow in 3 months for tube evaluation and exchange.  Advanced Directives:  [X] Full code  [ ] DNR  [ ] Hospice [id number]- [place] interpretor   pt is feeling well  fine overall pt is getting antibiotic [id number] /day no diarrhea- only first day of discharge no fevers, chills or night sweats  keeps IV ;line dry no flank pain  no blood in urine Plan is for  PICC for 6 weeks of iv abx  plan is for abx until 8/18/24, cbc, cmp once a week while on abx.   internist is  Marjorie Simpson [facility]  [phone]  also a  diabetic  was supposed to be on a tour of the [place] for [place] days and got sick had been touring  got sick when in [place] and had to come home    10 years ago, saw cousin in [place]- urologist historically whenever pain in his side he would exercise, and it would get better , didn't help this time

## 2024-07-28 NOTE — CONSULT LETTER
[Dear  ___] : Dear  [unfilled], [Consult Letter:] : I had the pleasure of evaluating your patient, [unfilled]. [Please see my note below.] : Please see my note below. [Consult Closing:] : Thank you very much for allowing me to participate in the care of this patient.  If you have any questions, please do not hesitate to contact me. [Sincerely,] : Sincerely, [FreeTextEntry3] :  Skyla Velasco M.D., FACP, TERESITA Chief, Division of Infectious Diseases Professor of Medicine [employer] [facility] [employer]  at [facility] [phone] (Phone) [phone]) email : [email or web address] [DrJackelyn  ___] : Dr. CASTORENA [DrJackelyn ___] : Dr. CASTORENA

## 2024-07-28 NOTE — ASSESSMENT
[FreeTextEntry1] : daughter is juan a franks- will make the appoitnmetn [phone] 74y/o man accompanied by wife  failed attempt at left ureteral stent insertion requiring left nephrostomy tube insertion. admitted to SICU for hemodynamic monitoring out of concern for urosepsis. Patient on IV pressors then downgraded to medicine unit once stable. Bates catheter removed 7/11/2024 per urology team. Patient bacteremic with MSSA,d and patient on IV Ancef. Patient will continue IV ancef until 8/18/2024. Patient found to have a blood sugar of >600. Endocrinology followed and managed.  Unusual to grow SA from a UTI Pt was bacteremic pt with a soft murmur - noted in hospital  TTE with no vegetations bacteremia cleared rapidly  will treat for 6 weeks and then do surveillnace cultures when done pt will need antibioitcs at the time of the urology procedure . will coordinate with urology how to best address. Does pt need to stay on po abs , after the IV pending procedure?  will discuss options with urology    TERESITA Rivas M.D., FACP Chief, Division of Infectious Diseases Professor of Medicine [employer] [facility] [employer]  at [facility] [phone] (Phone) [phone] (Fax) email : [email or web address]

## 2024-07-28 NOTE — ASSESSMENT
[FreeTextEntry1] : daughter is juan a franks- will make the appoitnmetn [phone] 74y/o man accompanied by wife  failed attempt at left ureteral stent insertion requiring left nephrostomy tube insertion. admitted to SICU for hemodynamic monitoring out of concern for urosepsis. Patient on IV pressors then downgraded to medicine unit once stable. Bates catheter removed 7/11/2024 per urology team. Patient bacteremic with MSSA,d and patient on IV Ancef. Patient will continue IV ancef until 8/18/2024. Patient found to have a blood sugar of >600. Endocrinology followed and managed.  Unusual to grow SA from a UTI Pt was bacteremic pt with a soft murmur - noted in hospital  TTE with no vegetations bacteremia cleared rapidly  will treat for 6 weeks and then do surveillnace cultures when done pt will need antibioitcs at the time of the urology procedure . will coordinate with urology how to best address. Does pt need to stay on po abs , after the IV pending procedure?  will discuss options with urology    TERESITA Rivas M.D., FACP Chief, Division of Infectious Diseases Professor of Medicine [employer] [facility] [employer]  at [facility] [phone] (Phone) [phone] (Fax) email : [email or web address]

## 2024-07-28 NOTE — CONSULT LETTER
[Dear  ___] : Dear  [unfilled], [Consult Letter:] : I had the pleasure of evaluating your patient, [unfilled]. [Please see my note below.] : Please see my note below. [Consult Closing:] : Thank you very much for allowing me to participate in the care of this patient.  If you have any questions, please do not hesitate to contact me. [Sincerely,] : Sincerely, [FreeTextEntry3] :  Skyla Velasco M.D., FACP, TERESITA Chief, Division of Infectious Diseases Professor of Medicine [employer] [facility] [employer]  at [facility] [phone] (Phone) [phone]) email : [email or web address] [DrJackelyn  ___] : Dr. CASTORENA [DrJackelyn ___] : Dr. CASTORENA

## 2024-07-28 NOTE — CONSULT LETTER
[Dear  ___] : Dear  [unfilled], [Consult Letter:] : I had the pleasure of evaluating your patient, [unfilled]. [Please see my note below.] : Please see my note below. [Consult Closing:] : Thank you very much for allowing me to participate in the care of this patient.  If you have any questions, please do not hesitate to contact me. [Sincerely,] : Sincerely, [FreeTextEntry3] :  Skyla Velasco M.D., FACP, TERESITA Chief, Division of Infectious Diseases Professor of Medicine [employer] [facility] [employer]  at [facility] [phone] (Phone) [phone] (Fax) email : [email or web address] [DrJackelyn  ___] : Dr. CASTORENA [DrJackelyn ___] : Dr. CASTORENA

## 2024-07-28 NOTE — HISTORY OF PRESENT ILLNESS
[FreeTextEntry1] : Hospital Course: Discharge Date 13-Jul-2024 Admission Date 06-Jul-2024 18:52 Reason for Admission Left Septic Stone and Hyperglycemia Hospital Course  HPI: 73-year-old male with history of diabetes, high blood pressure, kidney stones presenting with concern for left-sided flank pain.    Hospital Course: CT imaging showing: Obstructing left proximal ureteral stone 1.3 cm with moderate hydronephrosis. Patient taken to OR for emergent stenting, intra op difficulty with placing stent. IR c/s for perc nephrostomy tube, patient admitted to SICU for hemodynamic monitoring out of concern for urosepsis. Patient on IV pressors then downgraded to medicine unit once stable. Abtes catheter removed 7/11/2024 per urology team.  Patient bacteremic with MSSA, infectious disease following and patient on IV Ancef. Patient will continue IV ancef until 8/18/2024.  Patient found to have a blood sugar of >600. Endocrinology following.   Important Medication Changes and Reason: New medication: Cefazolin 2000mg intravenously every 8 hours for 6 weeks (end date: 8/18/2024). Endocrinology recommendations: Lantus 24 units daily and Metformin 1000mg BID.    Active or Pending Issues Requiring Follow-up: Follow-up with primary care doctor within one week.  Follow-up with infectious disease doctor, Dr. Velasco. Patient needs to have weekly lab work done and faxed to Dr. Velasco office. Follow-up with urology,  PAtient will require outpatient follow-up with either Dr. Antunez, Dr. Hoenig or Dr. Cantu for definitive stone management.  Follow-up with endocrinologist.  Regarding outpatient follow up with IR, if the patient is d/c home with drainage catheter they can make an appointment with IR by calling the IR booking office at [phone]; recommend IR follow in 3 months for tube evaluation and exchange.  Advanced Directives:  [X] Full code  [ ] DNR  [ ] Hospice [id number]- Sami interpretor   pt is feeling well  fine overall pt is getting antibiotic [id number] /day no diarrhea- only first day of discharge no fevers, chills or night sweats  keeps IV ;line dry no flank pain  no blood in urine Plan is for  PICC for 6 weeks of iv abx  plan is for abx until 8/18/24, cbc, cmp once a week while on abx.   internist is  Marjorie Simpson [facility]  [phone]  also a  diabetic  was supposed to be on a tour of the [place] for 40 days and got sick had been touring  got sick when in [place] and had to come home    10 years ago, saw cousin in [place]- urologist historically whenever pain in his side he would exercise, and it would get better , didn't help this time

## 2024-07-31 ENCOUNTER — NON-APPOINTMENT (OUTPATIENT)
Age: 74
End: 2024-07-31

## 2024-07-31 LAB
C PEPTIDE SERPL-MCNC: 0.9 NG/ML
CHOLEST SERPL-MCNC: 163 MG/DL
GLUCOSE SERPL-MCNC: 107 MG/DL
HDLC SERPL-MCNC: 48 MG/DL
LDLC SERPL CALC-MCNC: 93 MG/DL
NONHDLC SERPL-MCNC: 114 MG/DL
TRIGL SERPL-MCNC: 119 MG/DL
VIT B12 SERPL-MCNC: 1092 PG/ML

## 2024-08-01 ENCOUNTER — EMERGENCY (EMERGENCY)
Facility: HOSPITAL | Age: 74
LOS: 1 days | Discharge: HOME | End: 2024-08-01
Attending: EMERGENCY MEDICINE
Payer: MEDICARE

## 2024-08-01 ENCOUNTER — NON-APPOINTMENT (OUTPATIENT)
Age: 74
End: 2024-08-01

## 2024-08-01 VITALS
RESPIRATION RATE: 18 BRPM | TEMPERATURE: 98 F | DIASTOLIC BLOOD PRESSURE: 78 MMHG | HEART RATE: 67 BPM | OXYGEN SATURATION: 99 % | SYSTOLIC BLOOD PRESSURE: 121 MMHG

## 2024-08-01 VITALS
DIASTOLIC BLOOD PRESSURE: 77 MMHG | OXYGEN SATURATION: 98 % | HEIGHT: 71 IN | RESPIRATION RATE: 17 BRPM | SYSTOLIC BLOOD PRESSURE: 116 MMHG | WEIGHT: 136.03 LBS | TEMPERATURE: 98 F | HEART RATE: 76 BPM

## 2024-08-01 LAB
ADD ON TEST-SPECIMEN IN LAB: SIGNIFICANT CHANGE UP
ALBUMIN SERPL ELPH-MCNC: 4.3 G/DL — SIGNIFICANT CHANGE UP (ref 3.3–5)
ALP SERPL-CCNC: 92 U/L — SIGNIFICANT CHANGE UP (ref 40–120)
ALT FLD-CCNC: <5 U/L — LOW (ref 10–45)
ANION GAP SERPL CALC-SCNC: 12 MMOL/L — SIGNIFICANT CHANGE UP (ref 5–17)
AST SERPL-CCNC: 16 U/L — SIGNIFICANT CHANGE UP (ref 10–40)
BASE EXCESS BLDV CALC-SCNC: 0.6 MMOL/L — SIGNIFICANT CHANGE UP (ref -2–3)
BASOPHILS # BLD AUTO: 0.02 K/UL — SIGNIFICANT CHANGE UP (ref 0–0.2)
BASOPHILS NFR BLD AUTO: 0.9 % — SIGNIFICANT CHANGE UP (ref 0–2)
BILIRUB SERPL-MCNC: 0.5 MG/DL — SIGNIFICANT CHANGE UP (ref 0.2–1.2)
BUN SERPL-MCNC: 22 MG/DL — SIGNIFICANT CHANGE UP (ref 7–23)
CA-I SERPL-SCNC: 1.23 MMOL/L — SIGNIFICANT CHANGE UP (ref 1.15–1.33)
CALCIUM SERPL-MCNC: 9.8 MG/DL — SIGNIFICANT CHANGE UP (ref 8.4–10.5)
CHLORIDE BLDV-SCNC: 103 MMOL/L — SIGNIFICANT CHANGE UP (ref 96–108)
CHLORIDE SERPL-SCNC: 99 MMOL/L — SIGNIFICANT CHANGE UP (ref 96–108)
CO2 BLDV-SCNC: 27 MMOL/L — HIGH (ref 22–26)
CO2 SERPL-SCNC: 24 MMOL/L — SIGNIFICANT CHANGE UP (ref 22–31)
CREAT SERPL-MCNC: 1.43 MG/DL — HIGH (ref 0.5–1.3)
EGFR: 52 ML/MIN/1.73M2 — LOW
EGFR: 52 ML/MIN/1.73M2 — LOW
EOSINOPHIL # BLD AUTO: 0.04 K/UL — SIGNIFICANT CHANGE UP (ref 0–0.5)
EOSINOPHIL NFR BLD AUTO: 1.7 % — SIGNIFICANT CHANGE UP (ref 0–6)
GAS PNL BLDV: 133 MMOL/L — LOW (ref 136–145)
GAS PNL BLDV: SIGNIFICANT CHANGE UP
GLUCOSE BLDV-MCNC: 193 MG/DL — HIGH (ref 70–99)
GLUCOSE SERPL-MCNC: 197 MG/DL — HIGH (ref 70–99)
HCO3 BLDV-SCNC: 26 MMOL/L — SIGNIFICANT CHANGE UP (ref 22–29)
HCT VFR BLD CALC: 35 % — LOW (ref 39–50)
HCT VFR BLDA CALC: 36 % — LOW (ref 39–51)
HGB BLD CALC-MCNC: 11.9 G/DL — LOW (ref 12.6–17.4)
HGB BLD-MCNC: 11.4 G/DL — LOW (ref 13–17)
LACTATE BLDV-MCNC: 1 MMOL/L — SIGNIFICANT CHANGE UP (ref 0.5–2)
LYMPHOCYTES # BLD AUTO: 0.82 K/UL — LOW (ref 1–3.3)
LYMPHOCYTES # BLD AUTO: 39.1 % — SIGNIFICANT CHANGE UP (ref 13–44)
MANUAL SMEAR VERIFICATION: SIGNIFICANT CHANGE UP
MCHC RBC-ENTMCNC: 29.5 PG — SIGNIFICANT CHANGE UP (ref 27–34)
MCHC RBC-ENTMCNC: 32.6 GM/DL — SIGNIFICANT CHANGE UP (ref 32–36)
MCV RBC AUTO: 90.4 FL — SIGNIFICANT CHANGE UP (ref 80–100)
MONOCYTES # BLD AUTO: 0.31 K/UL — SIGNIFICANT CHANGE UP (ref 0–0.9)
MONOCYTES NFR BLD AUTO: 14.8 % — HIGH (ref 2–14)
NEUTROPHILS # BLD AUTO: 0.91 K/UL — LOW (ref 1.8–7.4)
NEUTROPHILS NFR BLD AUTO: 43.5 % — SIGNIFICANT CHANGE UP (ref 43–77)
OTHER CELLS CSF MANUAL: 11.5 ML/DL — LOW (ref 18–22)
PCO2 BLDV: 44 MMHG — SIGNIFICANT CHANGE UP (ref 42–55)
PH BLDV: 7.38 — SIGNIFICANT CHANGE UP (ref 7.32–7.43)
PLAT MORPH BLD: NORMAL — SIGNIFICANT CHANGE UP
PLATELET # BLD AUTO: 163 K/UL — SIGNIFICANT CHANGE UP (ref 150–400)
PO2 BLDV: 40 MMHG — SIGNIFICANT CHANGE UP (ref 25–45)
POTASSIUM BLDV-SCNC: 4.6 MMOL/L — SIGNIFICANT CHANGE UP (ref 3.5–5.1)
POTASSIUM SERPL-MCNC: 4.5 MMOL/L — SIGNIFICANT CHANGE UP (ref 3.5–5.3)
POTASSIUM SERPL-SCNC: 4.5 MMOL/L — SIGNIFICANT CHANGE UP (ref 3.5–5.3)
PROT SERPL-MCNC: 8.2 G/DL — SIGNIFICANT CHANGE UP (ref 6–8.3)
RBC # BLD: 3.87 M/UL — LOW (ref 4.2–5.8)
RBC # FLD: 13.1 % — SIGNIFICANT CHANGE UP (ref 10.3–14.5)
RBC BLD AUTO: SIGNIFICANT CHANGE UP
SAO2 % BLDV: 69.6 % — SIGNIFICANT CHANGE UP (ref 67–88)
SODIUM SERPL-SCNC: 135 MMOL/L — SIGNIFICANT CHANGE UP (ref 135–145)
WBC # BLD: 2.1 K/UL — LOW (ref 3.8–10.5)
WBC # FLD AUTO: 2.1 K/UL — LOW (ref 3.8–10.5)

## 2024-08-01 PROCEDURE — 84295 ASSAY OF SERUM SODIUM: CPT

## 2024-08-01 PROCEDURE — 85025 COMPLETE CBC W/AUTO DIFF WBC: CPT

## 2024-08-01 PROCEDURE — 96374 THER/PROPH/DIAG INJ IV PUSH: CPT

## 2024-08-01 PROCEDURE — 84132 ASSAY OF SERUM POTASSIUM: CPT

## 2024-08-01 PROCEDURE — 83605 ASSAY OF LACTIC ACID: CPT

## 2024-08-01 PROCEDURE — 82803 BLOOD GASES ANY COMBINATION: CPT

## 2024-08-01 PROCEDURE — 99284 EMERGENCY DEPT VISIT MOD MDM: CPT | Mod: 25

## 2024-08-01 PROCEDURE — 82947 ASSAY GLUCOSE BLOOD QUANT: CPT

## 2024-08-01 PROCEDURE — 82550 ASSAY OF CK (CPK): CPT

## 2024-08-01 PROCEDURE — 85018 HEMOGLOBIN: CPT

## 2024-08-01 PROCEDURE — 82435 ASSAY OF BLOOD CHLORIDE: CPT

## 2024-08-01 PROCEDURE — 82330 ASSAY OF CALCIUM: CPT

## 2024-08-01 PROCEDURE — 87798 DETECT AGENT NOS DNA AMP: CPT

## 2024-08-01 PROCEDURE — 99285 EMERGENCY DEPT VISIT HI MDM: CPT

## 2024-08-01 PROCEDURE — 85014 HEMATOCRIT: CPT

## 2024-08-01 PROCEDURE — 99283 EMERGENCY DEPT VISIT LOW MDM: CPT | Mod: GC

## 2024-08-01 PROCEDURE — 80053 COMPREHEN METABOLIC PANEL: CPT

## 2024-08-01 PROCEDURE — 87468 ANAPLSMA PHGCYTOPHLM AMP PRB: CPT

## 2024-08-01 PROCEDURE — 86618 LYME DISEASE ANTIBODY: CPT

## 2024-08-01 PROCEDURE — 87484 EHRLICHA CHAFFEENSIS AMP PRB: CPT

## 2024-08-01 RX ORDER — DAPTOMYCIN 500 MG/10ML
500 INJECTION, POWDER, LYOPHILIZED, FOR SOLUTION INTRAVENOUS
Qty: 1 | Refills: 0
Start: 2024-08-01 | End: 2024-08-14

## 2024-08-01 RX ORDER — DAPTOMYCIN 500 MG/10ML
500 INJECTION, POWDER, LYOPHILIZED, FOR SOLUTION INTRAVENOUS ONCE
Refills: 0 | Status: COMPLETED | OUTPATIENT
Start: 2024-08-01 | End: 2024-08-01

## 2024-08-01 RX ADMIN — DAPTOMYCIN 120 MILLIGRAM(S): 500 INJECTION, POWDER, LYOPHILIZED, FOR SOLUTION INTRAVENOUS at 15:41

## 2024-08-02 LAB
B BURGDOR C6 AB SER-ACNC: NEGATIVE — SIGNIFICANT CHANGE UP
B BURGDOR IGG+IGM SER-ACNC: 0.1 INDEX — SIGNIFICANT CHANGE UP (ref 0.01–0.9)
B MICROTI DNA BLD QL NAA+PROBE: SIGNIFICANT CHANGE UP
BABESIA 18S RRNA BLD QL NAA+PROBE: SIGNIFICANT CHANGE UP

## 2024-08-04 LAB
A PHAGOCYTOPH DNA BLD QL NAA+PROBE: NEGATIVE — SIGNIFICANT CHANGE UP
E CHAFFEENSIS DNA BLD QL NAA+PROBE: NEGATIVE — SIGNIFICANT CHANGE UP
E EWINGII DNA SPEC QL NAA+PROBE: NEGATIVE — SIGNIFICANT CHANGE UP
EHRLICHIA DNA SPEC QL NAA+PROBE: NEGATIVE — SIGNIFICANT CHANGE UP

## 2024-08-16 ENCOUNTER — NON-APPOINTMENT (OUTPATIENT)
Age: 74
End: 2024-08-16

## 2024-08-28 ENCOUNTER — OUTPATIENT (OUTPATIENT)
Dept: OUTPATIENT SERVICES | Facility: HOSPITAL | Age: 74
LOS: 1 days | End: 2024-08-28
Payer: MEDICARE

## 2024-08-28 VITALS
HEIGHT: 72 IN | SYSTOLIC BLOOD PRESSURE: 106 MMHG | RESPIRATION RATE: 16 BRPM | OXYGEN SATURATION: 98 % | TEMPERATURE: 98 F | DIASTOLIC BLOOD PRESSURE: 68 MMHG | WEIGHT: 136.03 LBS | HEART RATE: 86 BPM

## 2024-08-28 DIAGNOSIS — N20.0 CALCULUS OF KIDNEY: ICD-10-CM

## 2024-08-28 DIAGNOSIS — Z87.442 PERSONAL HISTORY OF URINARY CALCULI: Chronic | ICD-10-CM

## 2024-08-28 DIAGNOSIS — Z78.9 OTHER SPECIFIED HEALTH STATUS: ICD-10-CM

## 2024-08-28 DIAGNOSIS — Z96.0 PRESENCE OF UROGENITAL IMPLANTS: Chronic | ICD-10-CM

## 2024-08-28 DIAGNOSIS — Z01.818 ENCOUNTER FOR OTHER PREPROCEDURAL EXAMINATION: ICD-10-CM

## 2024-08-28 DIAGNOSIS — Z98.890 OTHER SPECIFIED POSTPROCEDURAL STATES: Chronic | ICD-10-CM

## 2024-08-28 LAB
ANION GAP SERPL CALC-SCNC: 10 MMOL/L — SIGNIFICANT CHANGE UP (ref 5–17)
BLD GP AB SCN SERPL QL: NEGATIVE — SIGNIFICANT CHANGE UP
BUN SERPL-MCNC: 21 MG/DL — SIGNIFICANT CHANGE UP (ref 7–23)
CALCIUM SERPL-MCNC: 9.8 MG/DL — SIGNIFICANT CHANGE UP (ref 8.4–10.5)
CHLORIDE SERPL-SCNC: 100 MMOL/L — SIGNIFICANT CHANGE UP (ref 96–108)
CO2 SERPL-SCNC: 23 MMOL/L — SIGNIFICANT CHANGE UP (ref 22–31)
CREAT SERPL-MCNC: 1.24 MG/DL — SIGNIFICANT CHANGE UP (ref 0.5–1.3)
EGFR: 61 ML/MIN/1.73M2 — SIGNIFICANT CHANGE UP
GLUCOSE SERPL-MCNC: 206 MG/DL — HIGH (ref 70–99)
HCT VFR BLD CALC: 32.2 % — LOW (ref 39–50)
HGB BLD-MCNC: 10.5 G/DL — LOW (ref 13–17)
MCHC RBC-ENTMCNC: 28.9 PG — SIGNIFICANT CHANGE UP (ref 27–34)
MCHC RBC-ENTMCNC: 32.6 GM/DL — SIGNIFICANT CHANGE UP (ref 32–36)
MCV RBC AUTO: 88.7 FL — SIGNIFICANT CHANGE UP (ref 80–100)
NRBC # BLD: 0 /100 WBCS — SIGNIFICANT CHANGE UP (ref 0–0)
PLATELET # BLD AUTO: 213 K/UL — SIGNIFICANT CHANGE UP (ref 150–400)
POTASSIUM SERPL-MCNC: 4.4 MMOL/L — SIGNIFICANT CHANGE UP (ref 3.5–5.3)
POTASSIUM SERPL-SCNC: 4.4 MMOL/L — SIGNIFICANT CHANGE UP (ref 3.5–5.3)
RBC # BLD: 3.63 M/UL — LOW (ref 4.2–5.8)
RBC # FLD: 13.5 % — SIGNIFICANT CHANGE UP (ref 10.3–14.5)
RH IG SCN BLD-IMP: POSITIVE — SIGNIFICANT CHANGE UP
SODIUM SERPL-SCNC: 133 MMOL/L — LOW (ref 135–145)
WBC # BLD: 5.7 K/UL — SIGNIFICANT CHANGE UP (ref 3.8–10.5)
WBC # FLD AUTO: 5.7 K/UL — SIGNIFICANT CHANGE UP (ref 3.8–10.5)

## 2024-08-28 PROCEDURE — 86850 RBC ANTIBODY SCREEN: CPT

## 2024-08-28 PROCEDURE — 83036 HEMOGLOBIN GLYCOSYLATED A1C: CPT

## 2024-08-28 PROCEDURE — 85027 COMPLETE CBC AUTOMATED: CPT

## 2024-08-28 PROCEDURE — 80048 BASIC METABOLIC PNL TOTAL CA: CPT

## 2024-08-28 PROCEDURE — 87086 URINE CULTURE/COLONY COUNT: CPT

## 2024-08-28 PROCEDURE — 87186 SC STD MICRODIL/AGAR DIL: CPT

## 2024-08-28 PROCEDURE — 86900 BLOOD TYPING SEROLOGIC ABO: CPT

## 2024-08-28 PROCEDURE — 86901 BLOOD TYPING SEROLOGIC RH(D): CPT

## 2024-08-28 PROCEDURE — G0463: CPT

## 2024-08-28 PROCEDURE — 87077 CULTURE AEROBIC IDENTIFY: CPT

## 2024-08-28 RX ORDER — SODIUM CHLORIDE 9 MG/ML
3 INJECTION INTRAMUSCULAR; INTRAVENOUS; SUBCUTANEOUS EVERY 8 HOURS
Refills: 0 | Status: DISCONTINUED | OUTPATIENT
Start: 2024-09-10 | End: 2024-09-10

## 2024-08-28 RX ORDER — LIDOCAINE HCL 20 MG/ML
0.2 VIAL (ML) INJECTION ONCE
Refills: 0 | Status: DISCONTINUED | OUTPATIENT
Start: 2024-09-10 | End: 2024-09-10

## 2024-08-28 RX ORDER — CHLORHEXIDINE GLUCONATE 40 MG/ML
1 SOLUTION TOPICAL ONCE
Refills: 0 | Status: DISCONTINUED | OUTPATIENT
Start: 2024-09-10 | End: 2024-09-10

## 2024-08-28 NOTE — H&P PST ADULT - NSICDXPASTSURGICALHX_GEN_ALL_CORE_FT
PAST SURGICAL HISTORY:  H/O renal calculi     S/P cystoscopy     S/P cystoscopy with ureteral stent placement

## 2024-08-28 NOTE — H&P PST ADULT - ASSESSMENT
Stage 1 Left percutaneous nephrolithotomy on 09/10/24.      Activity:   physically  active, walks 5 miles daily, home chores   Energy Expenditure score (DASI SCORE METS): 8.2  Symptoms : denies chest pain, palpitations, dyspnea, dizziness or  CLARKE,   Mallampati 3  Dental: Patient denies Loose teeth/Denture.  Stage 1 Left percutaneous nephrolithotomy on 09/10/24.  Activity:   physically  active, walks 5 miles daily, home chores   Energy Expenditure score (DASI SCORE METS): 8.2  Symptoms : denies chest pain, palpitations, dyspnea, dizziness or  CLARKE,   Mallampati 3  Dental: Patient denies Loose teeth/Denture.     CAPRINI SCORE    AGE RELATED RISK FACTORS                                                             [ ] Age 41-60 years                                            (1 Point)  [x ] Age: 61-74 years                                           (2 Points)                 [ ] Age= 75 years                                                (3 Points)             DISEASE RELATED RISK FACTORS                                                       [ ] Edema in the lower extremities                 (1 Point)                     [ ] Varicose veins                                               (1 Point)                                 [ ] BMI > 25 Kg/m2                                            (1 Point)                                  [ ] Serious infection (ie PNA)                            (1 Point)                     [ ] Lung disease ( COPD, Emphysema)            (1 Point)                                                                          [ ] Acute myocardial infarction                         (1 Point)                  [ ] Congestive heart failure (in the previous month)  (1 Point)         [ ] Inflammatory bowel disease                            (1 Point)                  [ ] Central venous access, PICC or Port               (2 points)       (within the last month)                                                                [ ] Stroke (in the previous month)                        (5 Points)    [ ] Previous or present malignancy                       (2 points)                                                                                                                                                         HEMATOLOGY RELATED FACTORS                                                         [ ] Prior episodes of VTE                                     (3 Points)                     [ ] Positive family history for VTE                      (3 Points)                  [ ] Prothrombin 95755 A                                     (3 Points)                     [ ] Factor V Leiden                                                (3 Points)                        [ ] Lupus anticoagulants                                      (3 Points)                                                           [ ] Anticardiolipin antibodies                              (3 Points)                                                       [ ] High homocysteine in the blood                   (3 Points)                                             [ ] Other congenital or acquired thrombophilia      (3 Points)                                                [ ] Heparin induced thrombocytopenia                  (3 Points)                                        MOBILITY RELATED FACTORS  [ ] Bed rest                                                         (1 Point)  [ ] Plaster cast                                                    (2 points)  [ ] Bed bound for more than 72 hours           (2 Points)    GENDER SPECIFIC FACTORS  [ ] Pregnancy or had a baby within the last month   (1 Point)  [ ] Post-partum < 6 weeks                                   (1 Point)  [ ] Hormonal therapy  or oral contraception   (1 Point)  [ ] History of pregnancy complications              (1 point)  [ ] Unexplained or recurrent              (1 Point)    OTHER RISK FACTORS                                           (1 Point)  [x ] BMI >40, smoking, diabetes requiring insulin, chemotherapy  blood transfusions and length of surgery over 2 hours    SURGERY RELATED RISK FACTORS  [ ]  Section within the last month     (1 Point)  [ ] Minor surgery                                                  (1 Point)  [ ] Arthroscopic surgery                                       (2 Points)  [ ] Planned major surgery lasting more            (2 Points)      than 45 minutes     [ ] Elective hip or knee joint replacement       (5 points)       surgery                                                TRAUMA RELATED RISK FACTORS  [ ] Fracture of the hip, pelvis, or leg                       (5 Points)  [ ] Spinal cord injury resulting in paralysis             (5 points)       (in the previous month)    [ ] Paralysis  (less than 1 month)                             (5 Points)  [ ] Multiple Trauma within 1 month                        (5 Points)    Total Score [    3    ]    Caprini Score 0-2: Low Risk, NO VTE prophylaxis required for most patients, encourage ambulation  Caprini Score 3-6: Moderate Risk , pharmacologic VTE prophylaxis is indicated for most patients (in the absence of contraindications)  Caprini Score Greater than or =7: High risk, pharmocologic VTE prophylaxis indicated for most patients (in the absence of contraindications)

## 2024-08-28 NOTE — H&P PST ADULT - HISTORY OF PRESENT ILLNESS
73  year old RHD Kinyarwanda speaking male PMH of T2DM, HTN ( currently not on medication) kinety stones with left renal calculi , recently  patient was admitted in [facility] ( 07/06/2024- 07/13/24) with left flank pain/ thrombocytopenia  , bacteremia ( MSSA) & urosepsis due to L obstructive kidney stone,  treated by ID with Ancef & Daptomycin completed /  S/p failed stenting with urology. S/p Percutaneous nephrostomy tube L with IR. Found to be in HHS on arrival, briefly on insulin gtt, transitioned to basal/bolus insulin  currently on Lantus 19 units daily AM/ on follow up with Endocrinology. Patient accompanied by his daughter. now presents for scheduled . Has a prior hx of kidney stones, had undergone an ESWL in the past in [place]. No other urologic procedures. Does state he has a hx of bph. Now presents  for scheduled Stage 1 Left percutaneous neprolithotomy on 09/10/24.

## 2024-08-28 NOTE — H&P PST ADULT - PROBLEM SELECTOR PLAN 1
Stage 1 Left percutaneous nephrolithotomy on 09/10/24.  Pre op cbc, bmp, aic UC( from both left Nephrostomy tune/ midstream) & T/s sent  Lantus 15 units only in AM DOS  On follow up with ENDO      Instructed to inform surgeon & seek medical attention if any changes in health  status like fever, chills, rash, infections, chest pain or any changes in health status . PST  instructions given in written/ explained about NPO, PREOP SKIN CARE  with antibacterial chlorhexidine wash x3 days preop(Hibiclens given) and ARRIVAL TIME  2 hours prior to OR time.Pre-op education provided - all questions answered. Pt verbalized understanding.

## 2024-08-28 NOTE — H&P PST ADULT - NSICDXPASTMEDICALHX_GEN_ALL_CORE_FT
PAST MEDICAL HISTORY:  Bacterial septicemia     Bacterial UTI     BPH (benign prostatic hyperplasia)     History of TB (tuberculosis)     HTN (hypertension)     Kidney stones

## 2024-08-28 NOTE — H&P PST ADULT - NSANTHOSAYNRD_GEN_A_CORE
No. AGNES screening performed.  STOP BANG Legend: 0-2 = LOW Risk; 3-4 = INTERMEDIATE Risk; 5-8 = HIGH Risk

## 2024-08-28 NOTE — H&P PST ADULT - REASON FOR ADMISSION
s/p treated  with sepsis  from left renal calculi  scheduled for  Stage 1 Left percutaneous nephrolithotomy on 09/10/24.

## 2024-08-28 NOTE — H&P PST ADULT - NSICDXPROCEDURE_GEN_ALL_CORE_FT
PROCEDURES:  Percutaneous left nephrolithotomy with fluoroscopic guidance using large C-arm 28-Aug-2024 16:57:03 Stage 1 Left percutaneous neprolithotomy on 09/10/24. Henry Larson

## 2024-08-28 NOTE — H&P PST ADULT - GENITOURINARY COMMENTS
stones in both kidneys, large in left renal calculi, left nephrostomy tube status 7/2024, h/o ESWL in the past in [place]

## 2024-08-29 LAB
A1C WITH ESTIMATED AVERAGE GLUCOSE RESULT: 7.5 % — HIGH (ref 4–5.6)
ESTIMATED AVERAGE GLUCOSE: 169 MG/DL — HIGH (ref 68–114)

## 2024-09-01 LAB
-  AMOXICILLIN/CLAVULANIC ACID: SIGNIFICANT CHANGE UP
-  AMPICILLIN/SULBACTAM: SIGNIFICANT CHANGE UP
-  AMPICILLIN: SIGNIFICANT CHANGE UP
-  AZTREONAM: SIGNIFICANT CHANGE UP
-  CEFAZOLIN: SIGNIFICANT CHANGE UP
-  CEFEPIME: SIGNIFICANT CHANGE UP
-  CEFOXITIN: SIGNIFICANT CHANGE UP
-  CEFTRIAXONE: SIGNIFICANT CHANGE UP
-  CIPROFLOXACIN: SIGNIFICANT CHANGE UP
-  ERTAPENEM: SIGNIFICANT CHANGE UP
-  GENTAMICIN: SIGNIFICANT CHANGE UP
-  IMIPENEM: SIGNIFICANT CHANGE UP
-  LEVOFLOXACIN: SIGNIFICANT CHANGE UP
-  MEROPENEM: SIGNIFICANT CHANGE UP
-  NITROFURANTOIN: SIGNIFICANT CHANGE UP
-  PIPERACILLIN/TAZOBACTAM: SIGNIFICANT CHANGE UP
-  TOBRAMYCIN: SIGNIFICANT CHANGE UP
-  TRIMETHOPRIM/SULFAMETHOXAZOLE: SIGNIFICANT CHANGE UP
CULTURE RESULTS: ABNORMAL
METHOD TYPE: SIGNIFICANT CHANGE UP
ORGANISM # SPEC MICROSCOPIC CNT: ABNORMAL
ORGANISM # SPEC MICROSCOPIC CNT: ABNORMAL
SPECIMEN SOURCE: SIGNIFICANT CHANGE UP

## 2024-09-02 LAB
-  AMOXICILLIN/CLAVULANIC ACID: SIGNIFICANT CHANGE UP
-  AMPICILLIN/SULBACTAM: SIGNIFICANT CHANGE UP
-  AMPICILLIN: SIGNIFICANT CHANGE UP
-  AZTREONAM: SIGNIFICANT CHANGE UP
-  CEFAZOLIN: SIGNIFICANT CHANGE UP
-  CEFEPIME: SIGNIFICANT CHANGE UP
-  CEFOXITIN: SIGNIFICANT CHANGE UP
-  CEFTRIAXONE: SIGNIFICANT CHANGE UP
-  CIPROFLOXACIN: SIGNIFICANT CHANGE UP
-  ERTAPENEM: SIGNIFICANT CHANGE UP
-  GENTAMICIN: SIGNIFICANT CHANGE UP
-  IMIPENEM: SIGNIFICANT CHANGE UP
-  LEVOFLOXACIN: SIGNIFICANT CHANGE UP
-  MEROPENEM: SIGNIFICANT CHANGE UP
-  PIPERACILLIN/TAZOBACTAM: SIGNIFICANT CHANGE UP
-  TOBRAMYCIN: SIGNIFICANT CHANGE UP
-  TRIMETHOPRIM/SULFAMETHOXAZOLE: SIGNIFICANT CHANGE UP
CULTURE RESULTS: ABNORMAL
METHOD TYPE: SIGNIFICANT CHANGE UP
ORGANISM # SPEC MICROSCOPIC CNT: ABNORMAL
ORGANISM # SPEC MICROSCOPIC CNT: ABNORMAL
SPECIMEN SOURCE: SIGNIFICANT CHANGE UP

## 2024-09-06 RX ORDER — SULFAMETHOXAZOLE AND TRIMETHOPRIM 800; 160 MG/1; MG/1
800-160 TABLET ORAL
Qty: 14 | Refills: 0 | Status: ACTIVE | COMMUNITY
Start: 2024-09-06 | End: 1900-01-01

## 2024-09-09 ENCOUNTER — TRANSCRIPTION ENCOUNTER (OUTPATIENT)
Age: 74
End: 2024-09-09

## 2024-09-10 ENCOUNTER — INPATIENT (INPATIENT)
Facility: HOSPITAL | Age: 74
LOS: 6 days | Discharge: HOME | DRG: 694 | End: 2024-09-17
Attending: UROLOGY | Admitting: UROLOGY
Payer: MEDICARE

## 2024-09-10 ENCOUNTER — APPOINTMENT (OUTPATIENT)
Dept: UROLOGY | Facility: HOSPITAL | Age: 74
End: 2024-09-10

## 2024-09-10 ENCOUNTER — RESULT REVIEW (OUTPATIENT)
Age: 74
End: 2024-09-10

## 2024-09-10 VITALS
TEMPERATURE: 98 F | HEIGHT: 72.01 IN | WEIGHT: 136.03 LBS | OXYGEN SATURATION: 99 % | HEART RATE: 64 BPM | DIASTOLIC BLOOD PRESSURE: 84 MMHG | SYSTOLIC BLOOD PRESSURE: 151 MMHG | RESPIRATION RATE: 17 BRPM

## 2024-09-10 DIAGNOSIS — Z98.890 OTHER SPECIFIED POSTPROCEDURAL STATES: Chronic | ICD-10-CM

## 2024-09-10 DIAGNOSIS — Z96.0 PRESENCE OF UROGENITAL IMPLANTS: Chronic | ICD-10-CM

## 2024-09-10 DIAGNOSIS — N20.0 CALCULUS OF KIDNEY: ICD-10-CM

## 2024-09-10 DIAGNOSIS — Z87.442 PERSONAL HISTORY OF URINARY CALCULI: Chronic | ICD-10-CM

## 2024-09-10 LAB
ANION GAP SERPL CALC-SCNC: 14 MMOL/L — SIGNIFICANT CHANGE UP (ref 5–17)
BASOPHILS # BLD AUTO: 0.02 K/UL — SIGNIFICANT CHANGE UP (ref 0–0.2)
BASOPHILS NFR BLD AUTO: 0.3 % — SIGNIFICANT CHANGE UP (ref 0–2)
BUN SERPL-MCNC: 19 MG/DL — SIGNIFICANT CHANGE UP (ref 7–23)
CALCIUM SERPL-MCNC: 8.6 MG/DL — SIGNIFICANT CHANGE UP (ref 8.4–10.5)
CHLORIDE SERPL-SCNC: 102 MMOL/L — SIGNIFICANT CHANGE UP (ref 96–108)
CO2 SERPL-SCNC: 21 MMOL/L — LOW (ref 22–31)
CREAT SERPL-MCNC: 1.76 MG/DL — HIGH (ref 0.5–1.3)
EGFR: 40 ML/MIN/1.73M2 — LOW
EOSINOPHIL # BLD AUTO: 0.02 K/UL — SIGNIFICANT CHANGE UP (ref 0–0.5)
EOSINOPHIL NFR BLD AUTO: 0.3 % — SIGNIFICANT CHANGE UP (ref 0–6)
GLUCOSE BLDC GLUCOMTR-MCNC: 101 MG/DL — HIGH (ref 70–99)
GLUCOSE BLDC GLUCOMTR-MCNC: 126 MG/DL — HIGH (ref 70–99)
GLUCOSE BLDC GLUCOMTR-MCNC: 99 MG/DL — SIGNIFICANT CHANGE UP (ref 70–99)
GLUCOSE SERPL-MCNC: 100 MG/DL — HIGH (ref 70–99)
HCT VFR BLD CALC: 33.7 % — LOW (ref 39–50)
HGB BLD-MCNC: 11.3 G/DL — LOW (ref 13–17)
IMM GRANULOCYTES NFR BLD AUTO: 0.4 % — SIGNIFICANT CHANGE UP (ref 0–0.9)
LYMPHOCYTES # BLD AUTO: 0.55 K/UL — LOW (ref 1–3.3)
LYMPHOCYTES # BLD AUTO: 7.5 % — LOW (ref 13–44)
MCHC RBC-ENTMCNC: 29.6 PG — SIGNIFICANT CHANGE UP (ref 27–34)
MCHC RBC-ENTMCNC: 33.5 GM/DL — SIGNIFICANT CHANGE UP (ref 32–36)
MCV RBC AUTO: 88.2 FL — SIGNIFICANT CHANGE UP (ref 80–100)
MONOCYTES # BLD AUTO: 0.16 K/UL — SIGNIFICANT CHANGE UP (ref 0–0.9)
MONOCYTES NFR BLD AUTO: 2.2 % — SIGNIFICANT CHANGE UP (ref 2–14)
NEUTROPHILS # BLD AUTO: 6.56 K/UL — SIGNIFICANT CHANGE UP (ref 1.8–7.4)
NEUTROPHILS NFR BLD AUTO: 89.3 % — HIGH (ref 43–77)
NRBC # BLD: 0 /100 WBCS — SIGNIFICANT CHANGE UP (ref 0–0)
PLATELET # BLD AUTO: 168 K/UL — SIGNIFICANT CHANGE UP (ref 150–400)
POTASSIUM SERPL-MCNC: 4.4 MMOL/L — SIGNIFICANT CHANGE UP (ref 3.5–5.3)
POTASSIUM SERPL-SCNC: 4.4 MMOL/L — SIGNIFICANT CHANGE UP (ref 3.5–5.3)
RBC # BLD: 3.82 M/UL — LOW (ref 4.2–5.8)
RBC # FLD: 13.2 % — SIGNIFICANT CHANGE UP (ref 10.3–14.5)
SODIUM SERPL-SCNC: 137 MMOL/L — SIGNIFICANT CHANGE UP (ref 135–145)
WBC # BLD: 7.34 K/UL — SIGNIFICANT CHANGE UP (ref 3.8–10.5)
WBC # FLD AUTO: 7.34 K/UL — SIGNIFICANT CHANGE UP (ref 3.8–10.5)

## 2024-09-10 PROCEDURE — 51702 INSERT TEMP BLADDER CATH: CPT

## 2024-09-10 PROCEDURE — 50389 REMOVE RENAL TUBE W/FLUORO: CPT | Mod: LT,59

## 2024-09-10 PROCEDURE — 88300 SURGICAL PATH GROSS: CPT | Mod: 26

## 2024-09-10 PROCEDURE — 50081 PERQ NL/PL LITHOTRP CPLX>2CM: CPT | Mod: LT

## 2024-09-10 PROCEDURE — 74176 CT ABD & PELVIS W/O CONTRAST: CPT | Mod: 26

## 2024-09-10 PROCEDURE — 50437 DILAT XST TRC NEW ACCESS RCS: CPT | Mod: LT,XU

## 2024-09-10 PROCEDURE — 71045 X-RAY EXAM CHEST 1 VIEW: CPT | Mod: 26

## 2024-09-10 PROCEDURE — 52356 CYSTO/URETERO W/LITHOTRIPSY: CPT | Mod: LT,XU

## 2024-09-10 PROCEDURE — 74420 UROGRAPHY RTRGR +-KUB: CPT | Mod: 26

## 2024-09-10 DEVICE — STONE BASKET ZEROTIP NITINOL 4-WIRE 1.9FR 120CM X 12MM: Type: IMPLANTABLE DEVICE | Site: LEFT | Status: FUNCTIONAL

## 2024-09-10 DEVICE — URETERAL STENT INLAY OPTIMA 8FR 26CM: Type: IMPLANTABLE DEVICE | Site: LEFT | Status: FUNCTIONAL

## 2024-09-10 DEVICE — URETERAL CATH SOF-FLEX OPEN END 6FR .040" X 70CM: Type: IMPLANTABLE DEVICE | Site: LEFT | Status: FUNCTIONAL

## 2024-09-10 DEVICE — TRILOGY PROBE KIT 3.9MM X 440MM (BLUE): Type: IMPLANTABLE DEVICE | Site: LEFT | Status: FUNCTIONAL

## 2024-09-10 DEVICE — GUIDEWIRE SENSOR DUAL-FLEX NITINOL STRAIGHT .035" X 150CM: Type: IMPLANTABLE DEVICE | Site: LEFT | Status: FUNCTIONAL

## 2024-09-10 DEVICE — AMPLATZ RENAL DILATOR 6FR-30FR X 16CM: Type: IMPLANTABLE DEVICE | Site: LEFT | Status: FUNCTIONAL

## 2024-09-10 DEVICE — NEPHROSTOMY BALLOON CATH NEPHROMAX 24FR 17CM PTFE: Type: IMPLANTABLE DEVICE | Site: LEFT | Status: FUNCTIONAL

## 2024-09-10 DEVICE — LASER FIBER FLEXIVA 365 HI POWER: Type: IMPLANTABLE DEVICE | Site: LEFT | Status: FUNCTIONAL

## 2024-09-10 DEVICE — URETERAL CATH IMAGER II BERN 5FR 65CM: Type: IMPLANTABLE DEVICE | Site: LEFT | Status: FUNCTIONAL

## 2024-09-10 RX ORDER — OXYCODONE HYDROCHLORIDE 5 MG/1
5 TABLET ORAL EVERY 6 HOURS
Refills: 0 | Status: DISCONTINUED | OUTPATIENT
Start: 2024-09-10 | End: 2024-09-12

## 2024-09-10 RX ORDER — DEXTROSE 15 G/33 G
15 GEL IN PACKET (GRAM) ORAL ONCE
Refills: 0 | Status: DISCONTINUED | OUTPATIENT
Start: 2024-09-10 | End: 2024-09-12

## 2024-09-10 RX ORDER — SULFAMETHOXAZOLE
0 POWDER (GRAM) MISCELLANEOUS
Refills: 0 | DISCHARGE

## 2024-09-10 RX ORDER — HEPARIN SODIUM,BOVINE 1000/ML
5000 VIAL (ML) INJECTION EVERY 8 HOURS
Refills: 0 | Status: DISCONTINUED | OUTPATIENT
Start: 2024-09-10 | End: 2024-09-12

## 2024-09-10 RX ORDER — CEFAZOLIN SODIUM 2 G/100ML
2000 INJECTION, SOLUTION INTRAVENOUS ONCE
Refills: 0 | Status: COMPLETED | OUTPATIENT
Start: 2024-09-10 | End: 2024-09-10

## 2024-09-10 RX ORDER — DEXTROSE 15 G/33 G
25 GEL IN PACKET (GRAM) ORAL ONCE
Refills: 0 | Status: DISCONTINUED | OUTPATIENT
Start: 2024-09-10 | End: 2024-09-12

## 2024-09-10 RX ORDER — ONDANSETRON 2 MG/ML
4 INJECTION, SOLUTION INTRAMUSCULAR; INTRAVENOUS ONCE
Refills: 0 | Status: DISCONTINUED | OUTPATIENT
Start: 2024-09-10 | End: 2024-09-10

## 2024-09-10 RX ORDER — LIDOCAINE/BENZALKONIUM/ALCOHOL
1 SOLUTION, NON-ORAL TOPICAL DAILY
Refills: 0 | Status: DISCONTINUED | OUTPATIENT
Start: 2024-09-10 | End: 2024-09-12

## 2024-09-10 RX ORDER — ACETAMINOPHEN 325 MG/1
975 TABLET ORAL EVERY 6 HOURS
Refills: 0 | Status: DISCONTINUED | OUTPATIENT
Start: 2024-09-10 | End: 2024-09-12

## 2024-09-10 RX ORDER — FENTANYL CITRATE 50 UG/ML
25 INJECTION INTRAMUSCULAR; INTRAVENOUS
Refills: 0 | Status: DISCONTINUED | OUTPATIENT
Start: 2024-09-10 | End: 2024-09-10

## 2024-09-10 RX ORDER — INSULIN GLARGINE 100 [IU]/ML
12 INJECTION, SOLUTION SUBCUTANEOUS
Refills: 0 | DISCHARGE

## 2024-09-10 RX ORDER — FLU VACCINE TS 2012-2013(5YR+) 45MCG/.5ML
0.5 VIAL (ML) INTRAMUSCULAR ONCE
Refills: 0 | Status: DISCONTINUED | OUTPATIENT
Start: 2024-09-10 | End: 2024-09-17

## 2024-09-10 RX ORDER — DEXTROSE 15 G/33 G
12.5 GEL IN PACKET (GRAM) ORAL ONCE
Refills: 0 | Status: DISCONTINUED | OUTPATIENT
Start: 2024-09-10 | End: 2024-09-12

## 2024-09-10 RX ORDER — GLUCAGON INJECTION, SOLUTION 1 MG/.2ML
1 INJECTION, SOLUTION SUBCUTANEOUS ONCE
Refills: 0 | Status: DISCONTINUED | OUTPATIENT
Start: 2024-09-10 | End: 2024-09-12

## 2024-09-10 RX ORDER — FENTANYL CITRATE 50 UG/ML
50 INJECTION INTRAMUSCULAR; INTRAVENOUS
Refills: 0 | Status: DISCONTINUED | OUTPATIENT
Start: 2024-09-10 | End: 2024-09-10

## 2024-09-10 RX ORDER — SENNA 187 MG
2 TABLET ORAL AT BEDTIME
Refills: 0 | Status: DISCONTINUED | OUTPATIENT
Start: 2024-09-10 | End: 2024-09-12

## 2024-09-10 RX ORDER — TAMSULOSIN HYDROCHLORIDE 0.4 MG/1
0.4 CAPSULE ORAL AT BEDTIME
Refills: 0 | Status: DISCONTINUED | OUTPATIENT
Start: 2024-09-10 | End: 2024-09-12

## 2024-09-10 RX ADMIN — Medication 5000 UNIT(S): at 22:52

## 2024-09-10 RX ADMIN — TAMSULOSIN HYDROCHLORIDE 0.4 MILLIGRAM(S): 0.4 CAPSULE ORAL at 22:52

## 2024-09-10 NOTE — PATIENT PROFILE ADULT - FALL HARM RISK - UNIVERSAL INTERVENTIONS
Bed in lowest position, wheels locked, appropriate side rails in place/Call bell, personal items and telephone in reach/Instruct patient to call for assistance before getting out of bed or chair/Non-slip footwear when patient is out of bed/[place] to call system/Physically safe environment - no spills, clutter or unnecessary equipment/Purposeful Proactive Rounding/Room/bathroom lighting operational, light cord in reach

## 2024-09-10 NOTE — PROGRESS NOTE ADULT - ASSESSMENT
A/P: 73y Male s/p L PCNL    DVT prophylaxis/OOB  Incentive spirometry  Strict I&O's  Analgesia and antiemetics as needed  Diet - consistent carbs   Follow-up PACU CXR final read   AM labs  AM TOV  CT tomorrow  Plan for 2nd stage Thursday

## 2024-09-10 NOTE — PATIENT PROFILE ADULT - FUNCTIONAL SCREEN CURRENT LEVEL: COMMUNICATION, MLM
Post-Care Instructions: Patient instructed to not lie down for 4 hours and limit physical activity for 24 hours. Patient instructed not to travel by airplane for 48 hours. 0 = understands/communicates without difficulty

## 2024-09-10 NOTE — PROCEDURE NOTE - ADDITIONAL PROCEDURE DETAILS
73yoM s/p L PCNL with lomeli in place and L NT.   L NT in place draining well translucent punch colored urine.  Initial lomeli catheter not draining well, attempted to irrigate with NS, unsuccessful. Initial catheter removed, 18F coude catheter placed.   Patient was prepped and draped with sterile technique. Lidocaine jelly 2% inserted for local anesthetic. 18F coude catheter inserted down to the hub with immediate return of translucent punch colored urine. Balloon inflated with 10cc sterile water and lomeli was secured to leg. Patient tolerated procedure well.     [facility]  [address] [place] [address], [place] [id number]  [place] [address]  [phone]

## 2024-09-11 ENCOUNTER — TRANSCRIPTION ENCOUNTER (OUTPATIENT)
Age: 74
End: 2024-09-11

## 2024-09-11 LAB
ANION GAP SERPL CALC-SCNC: 10 MMOL/L — SIGNIFICANT CHANGE UP (ref 5–17)
BASOPHILS # BLD AUTO: 0.02 K/UL — SIGNIFICANT CHANGE UP (ref 0–0.2)
BASOPHILS NFR BLD AUTO: 0.2 % — SIGNIFICANT CHANGE UP (ref 0–2)
BUN SERPL-MCNC: 20 MG/DL — SIGNIFICANT CHANGE UP (ref 7–23)
CALCIUM SERPL-MCNC: 8.5 MG/DL — SIGNIFICANT CHANGE UP (ref 8.4–10.5)
CHLORIDE SERPL-SCNC: 102 MMOL/L — SIGNIFICANT CHANGE UP (ref 96–108)
CO2 SERPL-SCNC: 23 MMOL/L — SIGNIFICANT CHANGE UP (ref 22–31)
CREAT SERPL-MCNC: 1.56 MG/DL — HIGH (ref 0.5–1.3)
EGFR: 47 ML/MIN/1.73M2 — LOW
EOSINOPHIL # BLD AUTO: 0 K/UL — SIGNIFICANT CHANGE UP (ref 0–0.5)
EOSINOPHIL NFR BLD AUTO: 0 % — SIGNIFICANT CHANGE UP (ref 0–6)
GLUCOSE BLDC GLUCOMTR-MCNC: 134 MG/DL — HIGH (ref 70–99)
GLUCOSE BLDC GLUCOMTR-MCNC: 139 MG/DL — HIGH (ref 70–99)
GLUCOSE BLDC GLUCOMTR-MCNC: 176 MG/DL — HIGH (ref 70–99)
GLUCOSE BLDC GLUCOMTR-MCNC: 197 MG/DL — HIGH (ref 70–99)
GLUCOSE BLDC GLUCOMTR-MCNC: 222 MG/DL — HIGH (ref 70–99)
GLUCOSE SERPL-MCNC: 192 MG/DL — HIGH (ref 70–99)
HCT VFR BLD CALC: 33.5 % — LOW (ref 39–50)
HGB BLD-MCNC: 11.2 G/DL — LOW (ref 13–17)
IMM GRANULOCYTES NFR BLD AUTO: 0.5 % — SIGNIFICANT CHANGE UP (ref 0–0.9)
LYMPHOCYTES # BLD AUTO: 0.9 K/UL — LOW (ref 1–3.3)
LYMPHOCYTES # BLD AUTO: 9.4 % — LOW (ref 13–44)
MCHC RBC-ENTMCNC: 29.6 PG — SIGNIFICANT CHANGE UP (ref 27–34)
MCHC RBC-ENTMCNC: 33.4 GM/DL — SIGNIFICANT CHANGE UP (ref 32–36)
MCV RBC AUTO: 88.6 FL — SIGNIFICANT CHANGE UP (ref 80–100)
MONOCYTES # BLD AUTO: 0.53 K/UL — SIGNIFICANT CHANGE UP (ref 0–0.9)
MONOCYTES NFR BLD AUTO: 5.5 % — SIGNIFICANT CHANGE UP (ref 2–14)
NEUTROPHILS # BLD AUTO: 8.05 K/UL — HIGH (ref 1.8–7.4)
NEUTROPHILS NFR BLD AUTO: 84.4 % — HIGH (ref 43–77)
NRBC # BLD: 0 /100 WBCS — SIGNIFICANT CHANGE UP (ref 0–0)
PLATELET # BLD AUTO: 166 K/UL — SIGNIFICANT CHANGE UP (ref 150–400)
POTASSIUM SERPL-MCNC: 4.8 MMOL/L — SIGNIFICANT CHANGE UP (ref 3.5–5.3)
POTASSIUM SERPL-SCNC: 4.8 MMOL/L — SIGNIFICANT CHANGE UP (ref 3.5–5.3)
RBC # BLD: 3.78 M/UL — LOW (ref 4.2–5.8)
RBC # FLD: 13.2 % — SIGNIFICANT CHANGE UP (ref 10.3–14.5)
SODIUM SERPL-SCNC: 135 MMOL/L — SIGNIFICANT CHANGE UP (ref 135–145)
WBC # BLD: 9.55 K/UL — SIGNIFICANT CHANGE UP (ref 3.8–10.5)
WBC # FLD AUTO: 9.55 K/UL — SIGNIFICANT CHANGE UP (ref 3.8–10.5)

## 2024-09-11 RX ADMIN — Medication 5000 UNIT(S): at 13:30

## 2024-09-11 RX ADMIN — Medication 200 MILLIGRAM(S): at 08:59

## 2024-09-11 RX ADMIN — ACETAMINOPHEN 975 MILLIGRAM(S): 325 TABLET ORAL at 06:32

## 2024-09-11 RX ADMIN — Medication 1: at 12:05

## 2024-09-11 RX ADMIN — Medication 200 MILLIGRAM(S): at 17:02

## 2024-09-11 RX ADMIN — Medication 125 MILLILITER(S): at 05:03

## 2024-09-11 RX ADMIN — Medication 125 MILLILITER(S): at 22:47

## 2024-09-11 RX ADMIN — Medication 5000 UNIT(S): at 22:51

## 2024-09-11 RX ADMIN — ACETAMINOPHEN 975 MILLIGRAM(S): 325 TABLET ORAL at 07:12

## 2024-09-11 RX ADMIN — TAMSULOSIN HYDROCHLORIDE 0.4 MILLIGRAM(S): 0.4 CAPSULE ORAL at 22:51

## 2024-09-11 RX ADMIN — Medication 5000 UNIT(S): at 06:33

## 2024-09-11 NOTE — PRE PROCEDURE NOTE - PRE PROCEDURE EVALUATION
Urology Preop Note    Diagnosis: nephrolithiasis  Procedure: R PCNL  Surgeon: Tulio                          11.2   9.55  )-----------( 166      ( 11 Sep 2024 06:07 )             33.5       09-11    135  |  102  |  20  ----------------------------<  192<H>  4.8   |  23  |  1.56<H>    Ca    8.5      11 Sep 2024 06:07            Urinalysis Basic - ( 11 Sep 2024 06:07 )    Color: x / Appearance: x / SG: x / pH: x  Gluc: 192 mg/dL / Ketone: x  / Bili: x / Urobili: x   Blood: x / Protein: x / Nitrite: x   Leuk Esterase: x / RBC: x / WBC x   Sq Epi: x / Non Sq Epi: x / Bacteria: x      UCx:  Culture - Urine (08.28.24 @ 18:24)   - Tobramycin: S <=2  - Trimethoprim/Sulfamethoxazole: S <=0.5/9.5  - Ampicillin: R <=8 These ampicillin results predict results for amoxicillin  - Ampicillin/Sulbactam: R <=4/2  - Aztreonam: S <=4  - Cefazolin: R 4  - Cefepime: S <=2  - Cefoxitin: R <=8  - Ceftriaxone: S <=1 Enterobacter cloacae, Klebsiella aerogenes, and Citrobacter freundii may develop resistance during prolonged therapy.  - Ciprofloxacin: S <=0.25  - Amoxicillin/Clavulanic Acid: R <=8/4  - Ertapenem: S <=0.5  - Gentamicin: S <=2  - Imipenem: S <=1  - Levofloxacin: S <=0.5  - Meropenem: S <=1  - Nitrofurantoin: S <=32 Should not be used to treat pyelonephritis  - Piperacillin/Tazobactam: S <=8 Treatment with Pipercillin/Tazobactam is not recommended in severe infections casued by Klebsiella aerogenes, Enterobacter cloacae complex, and Citrobacter freundii complex.  Specimen Source: Clean Catch Clean Catch (Midstream)  Culture Results:   >100,000 CFU/ml Enterobacter cloacae complex  Organism Identification: Enterobacter cloacae complex  Organism: Enterobacter cloacae complex  Method Type: DELMA      CXR:     Orders:  NPO after midnight  IVF after midnight   Consent obtained  Clearance  Type & Screen  Anti-coagulation held   2 units on hold for OR      Urology Preop Note    Diagnosis: nephrolithiasis  Procedure: R PCNL  Surgeon: Tulio                          11.2   9.55  )-----------( 166      ( 11 Sep 2024 06:07 )             33.5       09-11    135  |  102  |  20  ----------------------------<  192<H>  4.8   |  23  |  1.56<H>    Ca    8.5      11 Sep 2024 06:07            Urinalysis Basic - ( 11 Sep 2024 06:07 )    Color: x / Appearance: x / SG: x / pH: x  Gluc: 192 mg/dL / Ketone: x  / Bili: x / Urobili: x   Blood: x / Protein: x / Nitrite: x   Leuk Esterase: x / RBC: x / WBC x   Sq Epi: x / Non Sq Epi: x / Bacteria: x      UCx:  Culture - Urine (08.28.24 @ 18:24)   - Tobramycin: S <=2  - Trimethoprim/Sulfamethoxazole: S <=0.5/9.5  - Ampicillin: R <=8 These ampicillin results predict results for amoxicillin  - Ampicillin/Sulbactam: R <=4/2  - Aztreonam: S <=4  - Cefazolin: R 4  - Cefepime: S <=2  - Cefoxitin: R <=8  - Ceftriaxone: S <=1 Enterobacter cloacae, Klebsiella aerogenes, and Citrobacter freundii may develop resistance during prolonged therapy.  - Ciprofloxacin: S <=0.25  - Amoxicillin/Clavulanic Acid: R <=8/4  - Ertapenem: S <=0.5  - Gentamicin: S <=2  - Imipenem: S <=1  - Levofloxacin: S <=0.5  - Meropenem: S <=1  - Nitrofurantoin: S <=32 Should not be used to treat pyelonephritis  - Piperacillin/Tazobactam: S <=8 Treatment with Pipercillin/Tazobactam is not recommended in severe infections casued by Klebsiella aerogenes, Enterobacter cloacae complex, and Citrobacter freundii complex.  Specimen Source: Clean Catch Clean Catch (Midstream)  Culture Results:   >100,000 CFU/ml Enterobacter cloacae complex  Organism Identification: Enterobacter cloacae complex  Organism: Enterobacter cloacae complex  Method Type: DELMA      CXR:   ACC: [id number] EXAM: XR CHEST AP OR PA 1V ORDERED BY: RAF NOYOLA    PROCEDURE DATE: 09/10/2024        INTERPRETATION: EXAMINATION: XR CHEST    CLINICAL INDICATION: Pneumothorax    TECHNIQUE: Single frontal, portable view of the chest was obtained.    COMPARISON: Chest x-ray 7/12/2024.    FINDINGS:  The heart is normal in size.  The lungs are clear.  There is no appreciable pneumothorax or pleural effusion.  No acute osseous abnormalities.    IMPRESSION:  No pneumothorax.    --- End of Report ---    Orders:  NPO after midnight  IVF after midnight   Consent obtained  Clearance  Type & Screen  Anti-coagulation held  2 units on hold for OR

## 2024-09-12 ENCOUNTER — RESULT REVIEW (OUTPATIENT)
Age: 74
End: 2024-09-12

## 2024-09-12 ENCOUNTER — APPOINTMENT (OUTPATIENT)
Dept: UROLOGY | Facility: HOSPITAL | Age: 74
End: 2024-09-12

## 2024-09-12 LAB
ANION GAP SERPL CALC-SCNC: 11 MMOL/L — SIGNIFICANT CHANGE UP (ref 5–17)
ANION GAP SERPL CALC-SCNC: 11 MMOL/L — SIGNIFICANT CHANGE UP (ref 5–17)
BLD GP AB SCN SERPL QL: NEGATIVE — SIGNIFICANT CHANGE UP
BUN SERPL-MCNC: 20 MG/DL — SIGNIFICANT CHANGE UP (ref 7–23)
BUN SERPL-MCNC: 20 MG/DL — SIGNIFICANT CHANGE UP (ref 7–23)
CALCIUM SERPL-MCNC: 8.6 MG/DL — SIGNIFICANT CHANGE UP (ref 8.4–10.5)
CALCIUM SERPL-MCNC: 8.8 MG/DL — SIGNIFICANT CHANGE UP (ref 8.4–10.5)
CHLORIDE SERPL-SCNC: 104 MMOL/L — SIGNIFICANT CHANGE UP (ref 96–108)
CHLORIDE SERPL-SCNC: 104 MMOL/L — SIGNIFICANT CHANGE UP (ref 96–108)
CO2 SERPL-SCNC: 22 MMOL/L — SIGNIFICANT CHANGE UP (ref 22–31)
CO2 SERPL-SCNC: 23 MMOL/L — SIGNIFICANT CHANGE UP (ref 22–31)
CREAT SERPL-MCNC: 1.5 MG/DL — HIGH (ref 0.5–1.3)
CREAT SERPL-MCNC: 1.5 MG/DL — HIGH (ref 0.5–1.3)
CULTURE RESULTS: SIGNIFICANT CHANGE UP
EGFR: 49 ML/MIN/1.73M2 — LOW
EGFR: 49 ML/MIN/1.73M2 — LOW
GLUCOSE BLDC GLUCOMTR-MCNC: 131 MG/DL — HIGH (ref 70–99)
GLUCOSE BLDC GLUCOMTR-MCNC: 147 MG/DL — HIGH (ref 70–99)
GLUCOSE BLDC GLUCOMTR-MCNC: 168 MG/DL — HIGH (ref 70–99)
GLUCOSE BLDC GLUCOMTR-MCNC: 206 MG/DL — HIGH (ref 70–99)
GLUCOSE SERPL-MCNC: 122 MG/DL — HIGH (ref 70–99)
GLUCOSE SERPL-MCNC: 173 MG/DL — HIGH (ref 70–99)
HCT VFR BLD CALC: 30.8 % — LOW (ref 39–50)
HCT VFR BLD CALC: 31.8 % — LOW (ref 39–50)
HGB BLD-MCNC: 10.1 G/DL — LOW (ref 13–17)
HGB BLD-MCNC: 10.7 G/DL — LOW (ref 13–17)
MCHC RBC-ENTMCNC: 29.4 PG — SIGNIFICANT CHANGE UP (ref 27–34)
MCHC RBC-ENTMCNC: 30.1 PG — SIGNIFICANT CHANGE UP (ref 27–34)
MCHC RBC-ENTMCNC: 32.8 GM/DL — SIGNIFICANT CHANGE UP (ref 32–36)
MCHC RBC-ENTMCNC: 33.6 GM/DL — SIGNIFICANT CHANGE UP (ref 32–36)
MCV RBC AUTO: 89.5 FL — SIGNIFICANT CHANGE UP (ref 80–100)
MCV RBC AUTO: 89.6 FL — SIGNIFICANT CHANGE UP (ref 80–100)
NRBC # BLD: 0 /100 WBCS — SIGNIFICANT CHANGE UP (ref 0–0)
NRBC # BLD: 0 /100 WBCS — SIGNIFICANT CHANGE UP (ref 0–0)
PLATELET # BLD AUTO: 141 K/UL — LOW (ref 150–400)
PLATELET # BLD AUTO: 147 K/UL — LOW (ref 150–400)
POTASSIUM SERPL-MCNC: 4 MMOL/L — SIGNIFICANT CHANGE UP (ref 3.5–5.3)
POTASSIUM SERPL-MCNC: 4.5 MMOL/L — SIGNIFICANT CHANGE UP (ref 3.5–5.3)
POTASSIUM SERPL-SCNC: 4 MMOL/L — SIGNIFICANT CHANGE UP (ref 3.5–5.3)
POTASSIUM SERPL-SCNC: 4.5 MMOL/L — SIGNIFICANT CHANGE UP (ref 3.5–5.3)
RBC # BLD: 3.44 M/UL — LOW (ref 4.2–5.8)
RBC # BLD: 3.55 M/UL — LOW (ref 4.2–5.8)
RBC # FLD: 13.3 % — SIGNIFICANT CHANGE UP (ref 10.3–14.5)
RBC # FLD: 13.4 % — SIGNIFICANT CHANGE UP (ref 10.3–14.5)
RH IG SCN BLD-IMP: POSITIVE — SIGNIFICANT CHANGE UP
SODIUM SERPL-SCNC: 137 MMOL/L — SIGNIFICANT CHANGE UP (ref 135–145)
SODIUM SERPL-SCNC: 138 MMOL/L — SIGNIFICANT CHANGE UP (ref 135–145)
SPECIMEN SOURCE: SIGNIFICANT CHANGE UP
WBC # BLD: 6.49 K/UL — SIGNIFICANT CHANGE UP (ref 3.8–10.5)
WBC # BLD: 6.81 K/UL — SIGNIFICANT CHANGE UP (ref 3.8–10.5)
WBC # FLD AUTO: 6.49 K/UL — SIGNIFICANT CHANGE UP (ref 3.8–10.5)
WBC # FLD AUTO: 6.81 K/UL — SIGNIFICANT CHANGE UP (ref 3.8–10.5)

## 2024-09-12 PROCEDURE — 88300 SURGICAL PATH GROSS: CPT | Mod: 26

## 2024-09-12 PROCEDURE — 71045 X-RAY EXAM CHEST 1 VIEW: CPT | Mod: 26

## 2024-09-12 PROCEDURE — 74420 UROGRAPHY RTRGR +-KUB: CPT | Mod: 26

## 2024-09-12 PROCEDURE — 50081 PERQ NL/PL LITHOTRP CPLX>2CM: CPT | Mod: RT,79

## 2024-09-12 PROCEDURE — 50389 REMOVE RENAL TUBE W/FLUORO: CPT | Mod: LT,59,79

## 2024-09-12 PROCEDURE — 50437 DILAT XST TRC NEW ACCESS RCS: CPT | Mod: RT,59,79

## 2024-09-12 PROCEDURE — 52005 CYSTO W/URTRL CATHJ: CPT | Mod: RT,59,79

## 2024-09-12 DEVICE — URETERAL CATH DUAL LUMEN 10FR 54CM: Type: IMPLANTABLE DEVICE | Site: RIGHT | Status: FUNCTIONAL

## 2024-09-12 DEVICE — SURGIFLO MATRIX WITH THROMBIN KIT: Type: IMPLANTABLE DEVICE | Site: RIGHT | Status: FUNCTIONAL

## 2024-09-12 RX ORDER — SENNA 187 MG
2 TABLET ORAL AT BEDTIME
Refills: 0 | Status: DISCONTINUED | OUTPATIENT
Start: 2024-09-12 | End: 2024-09-14

## 2024-09-12 RX ORDER — HEPARIN SODIUM,BOVINE 1000/ML
5000 VIAL (ML) INJECTION EVERY 8 HOURS
Refills: 0 | Status: DISCONTINUED | OUTPATIENT
Start: 2024-09-12 | End: 2024-09-17

## 2024-09-12 RX ORDER — LIDOCAINE/BENZALKONIUM/ALCOHOL
1 SOLUTION, NON-ORAL TOPICAL DAILY
Refills: 0 | Status: DISCONTINUED | OUTPATIENT
Start: 2024-09-12 | End: 2024-09-17

## 2024-09-12 RX ORDER — OXYCODONE HYDROCHLORIDE 5 MG/1
10 TABLET ORAL EVERY 4 HOURS
Refills: 0 | Status: DISCONTINUED | OUTPATIENT
Start: 2024-09-12 | End: 2024-09-17

## 2024-09-12 RX ORDER — TAMSULOSIN HYDROCHLORIDE 0.4 MG/1
0.4 CAPSULE ORAL AT BEDTIME
Refills: 0 | Status: DISCONTINUED | OUTPATIENT
Start: 2024-09-12 | End: 2024-09-17

## 2024-09-12 RX ORDER — DEXTROSE 15 G/33 G
25 GEL IN PACKET (GRAM) ORAL ONCE
Refills: 0 | Status: DISCONTINUED | OUTPATIENT
Start: 2024-09-12 | End: 2024-09-17

## 2024-09-12 RX ORDER — FENTANYL CITRATE 50 UG/ML
50 INJECTION INTRAMUSCULAR; INTRAVENOUS ONCE
Refills: 0 | Status: DISCONTINUED | OUTPATIENT
Start: 2024-09-12 | End: 2024-09-12

## 2024-09-12 RX ORDER — DEXTROSE 15 G/33 G
15 GEL IN PACKET (GRAM) ORAL ONCE
Refills: 0 | Status: DISCONTINUED | OUTPATIENT
Start: 2024-09-12 | End: 2024-09-17

## 2024-09-12 RX ORDER — OXYCODONE HYDROCHLORIDE 5 MG/1
5 TABLET ORAL EVERY 4 HOURS
Refills: 0 | Status: DISCONTINUED | OUTPATIENT
Start: 2024-09-12 | End: 2024-09-17

## 2024-09-12 RX ORDER — ACETAMINOPHEN 325 MG/1
1000 TABLET ORAL EVERY 6 HOURS
Refills: 0 | Status: COMPLETED | OUTPATIENT
Start: 2024-09-12 | End: 2024-09-13

## 2024-09-12 RX ORDER — ONDANSETRON 2 MG/ML
4 INJECTION, SOLUTION INTRAMUSCULAR; INTRAVENOUS ONCE
Refills: 0 | Status: DISCONTINUED | OUTPATIENT
Start: 2024-09-12 | End: 2024-09-12

## 2024-09-12 RX ORDER — METOCLOPRAMIDE HCL 5 MG
10 TABLET ORAL ONCE
Refills: 0 | Status: DISCONTINUED | OUTPATIENT
Start: 2024-09-12 | End: 2024-09-12

## 2024-09-12 RX ORDER — FENTANYL CITRATE 50 UG/ML
25 INJECTION INTRAMUSCULAR; INTRAVENOUS
Refills: 0 | Status: DISCONTINUED | OUTPATIENT
Start: 2024-09-12 | End: 2024-09-12

## 2024-09-12 RX ADMIN — Medication 1: at 18:37

## 2024-09-12 RX ADMIN — Medication 5000 UNIT(S): at 22:19

## 2024-09-12 RX ADMIN — Medication 1: at 00:05

## 2024-09-12 RX ADMIN — TAMSULOSIN HYDROCHLORIDE 0.4 MILLIGRAM(S): 0.4 CAPSULE ORAL at 22:19

## 2024-09-12 RX ADMIN — ACETAMINOPHEN 1000 MILLIGRAM(S): 325 TABLET ORAL at 23:30

## 2024-09-12 RX ADMIN — Medication 5000 UNIT(S): at 14:31

## 2024-09-12 RX ADMIN — Medication 1 PATCH: at 22:20

## 2024-09-12 RX ADMIN — Medication 125 MILLILITER(S): at 06:21

## 2024-09-12 RX ADMIN — Medication 5000 UNIT(S): at 06:28

## 2024-09-12 RX ADMIN — ACETAMINOPHEN 975 MILLIGRAM(S): 325 TABLET ORAL at 07:22

## 2024-09-12 RX ADMIN — ACETAMINOPHEN 975 MILLIGRAM(S): 325 TABLET ORAL at 06:22

## 2024-09-12 RX ADMIN — ACETAMINOPHEN 400 MILLIGRAM(S): 325 TABLET ORAL at 23:10

## 2024-09-12 RX ADMIN — Medication 125 MILLILITER(S): at 19:03

## 2024-09-12 RX ADMIN — Medication 125 MILLILITER(S): at 23:11

## 2024-09-12 RX ADMIN — Medication 200 MILLIGRAM(S): at 06:22

## 2024-09-12 RX ADMIN — Medication 2: at 22:15

## 2024-09-12 NOTE — BRIEF OPERATIVE NOTE - BRIEF OP NOTE DRAINS
Left Qagan [place]in 16Fr NT  Left 8x26 JJ stent, left  16Fr Bates
MADELAINE leyva, lomeli catheter, left PCN removed, left internal stent still in place

## 2024-09-12 NOTE — PRE-ANESTHESIA EVALUATION ADULT - NSANTHOSAYNRD_GEN_A_CORE
No. AGNES screening performed.  STOP BANG Legend: 0-2 = LOW Risk; 3-4 = INTERMEDIATE Risk; 5-8 = HIGH Risk
No. AGNES screening performed.  STOP BANG Legend: 0-2 = LOW Risk; 3-4 = INTERMEDIATE Risk; 5-8 = HIGH Risk

## 2024-09-12 NOTE — PROGRESS NOTE ADULT - ASSESSMENT
A/P: 73y Male s/p R PCNL, L NT removal  DVT prophylaxis/OOB  Incentive spirometry  Strict I&O's  Analgesia and antiemetics as needed  Diet  AM/PACU labs  f/u CXR final read  cipro  f/u k, k, s cultures  AM TOV pending urine color  AM CT

## 2024-09-12 NOTE — PRE-ANESTHESIA EVALUATION ADULT - NSPROPOSEDPROCEDFT_GEN_ALL_CORE
Right percutaneous stone extraction
Left ureteroscopy, laser lithotripsy, percutaneous nephrostolithotomy, left ureteral stent

## 2024-09-12 NOTE — BRIEF OPERATIVE NOTE - OPERATION/FINDINGS
Right PCNL
Procedure: Left PCNL, left NT, left ureteral stent  Preop dx: Left kidney stone  Postop dx: Left kidney stone

## 2024-09-13 LAB
ANION GAP SERPL CALC-SCNC: 14 MMOL/L — SIGNIFICANT CHANGE UP (ref 5–17)
BUN SERPL-MCNC: 34 MG/DL — HIGH (ref 7–23)
CALCIUM SERPL-MCNC: 8.3 MG/DL — LOW (ref 8.4–10.5)
CHLORIDE SERPL-SCNC: 94 MMOL/L — LOW (ref 96–108)
CO2 SERPL-SCNC: 22 MMOL/L — SIGNIFICANT CHANGE UP (ref 22–31)
CREAT SERPL-MCNC: 2.5 MG/DL — HIGH (ref 0.5–1.3)
CULTURE RESULTS: NO GROWTH — SIGNIFICANT CHANGE UP
EGFR: 26 ML/MIN/1.73M2 — LOW
GLUCOSE BLDC GLUCOMTR-MCNC: 240 MG/DL — HIGH (ref 70–99)
GLUCOSE BLDC GLUCOMTR-MCNC: 250 MG/DL — HIGH (ref 70–99)
GLUCOSE BLDC GLUCOMTR-MCNC: 264 MG/DL — HIGH (ref 70–99)
GLUCOSE BLDC GLUCOMTR-MCNC: 264 MG/DL — HIGH (ref 70–99)
GLUCOSE BLDC GLUCOMTR-MCNC: 278 MG/DL — HIGH (ref 70–99)
GLUCOSE SERPL-MCNC: 256 MG/DL — HIGH (ref 70–99)
HCT VFR BLD CALC: 26.3 % — LOW (ref 39–50)
HGB BLD-MCNC: 8.8 G/DL — LOW (ref 13–17)
MCHC RBC-ENTMCNC: 29.7 PG — SIGNIFICANT CHANGE UP (ref 27–34)
MCHC RBC-ENTMCNC: 33.5 GM/DL — SIGNIFICANT CHANGE UP (ref 32–36)
MCV RBC AUTO: 88.9 FL — SIGNIFICANT CHANGE UP (ref 80–100)
NRBC # BLD: 0 /100 WBCS — SIGNIFICANT CHANGE UP (ref 0–0)
PLATELET # BLD AUTO: 127 K/UL — LOW (ref 150–400)
POTASSIUM SERPL-MCNC: 4.3 MMOL/L — SIGNIFICANT CHANGE UP (ref 3.5–5.3)
POTASSIUM SERPL-SCNC: 4.3 MMOL/L — SIGNIFICANT CHANGE UP (ref 3.5–5.3)
RBC # BLD: 2.96 M/UL — LOW (ref 4.2–5.8)
RBC # FLD: 13.3 % — SIGNIFICANT CHANGE UP (ref 10.3–14.5)
SODIUM SERPL-SCNC: 130 MMOL/L — LOW (ref 135–145)
SPECIMEN SOURCE: SIGNIFICANT CHANGE UP
WBC # BLD: 9.15 K/UL — SIGNIFICANT CHANGE UP (ref 3.8–10.5)
WBC # FLD AUTO: 9.15 K/UL — SIGNIFICANT CHANGE UP (ref 3.8–10.5)

## 2024-09-13 PROCEDURE — 74176 CT ABD & PELVIS W/O CONTRAST: CPT | Mod: 26

## 2024-09-13 RX ORDER — ACETAMINOPHEN 325 MG/1
1000 TABLET ORAL ONCE
Refills: 0 | Status: COMPLETED | OUTPATIENT
Start: 2024-09-13 | End: 2024-09-13

## 2024-09-13 RX ORDER — ONDANSETRON 2 MG/ML
4 INJECTION, SOLUTION INTRAMUSCULAR; INTRAVENOUS ONCE
Refills: 0 | Status: COMPLETED | OUTPATIENT
Start: 2024-09-13 | End: 2024-09-13

## 2024-09-13 RX ADMIN — Medication 5000 UNIT(S): at 13:32

## 2024-09-13 RX ADMIN — OXYCODONE HYDROCHLORIDE 5 MILLIGRAM(S): 5 TABLET ORAL at 17:26

## 2024-09-13 RX ADMIN — OXYCODONE HYDROCHLORIDE 10 MILLIGRAM(S): 5 TABLET ORAL at 08:11

## 2024-09-13 RX ADMIN — ACETAMINOPHEN 1000 MILLIGRAM(S): 325 TABLET ORAL at 12:42

## 2024-09-13 RX ADMIN — Medication 2 TABLET(S): at 19:07

## 2024-09-13 RX ADMIN — Medication 200 MILLIGRAM(S): at 17:27

## 2024-09-13 RX ADMIN — OXYCODONE HYDROCHLORIDE 10 MILLIGRAM(S): 5 TABLET ORAL at 09:00

## 2024-09-13 RX ADMIN — Medication 3: at 09:09

## 2024-09-13 RX ADMIN — OXYCODONE HYDROCHLORIDE 5 MILLIGRAM(S): 5 TABLET ORAL at 18:19

## 2024-09-13 RX ADMIN — Medication 3: at 13:30

## 2024-09-13 RX ADMIN — ACETAMINOPHEN 1000 MILLIGRAM(S): 325 TABLET ORAL at 22:40

## 2024-09-13 RX ADMIN — ACETAMINOPHEN 400 MILLIGRAM(S): 325 TABLET ORAL at 05:36

## 2024-09-13 RX ADMIN — Medication 5000 UNIT(S): at 05:36

## 2024-09-13 RX ADMIN — Medication 5000 UNIT(S): at 21:39

## 2024-09-13 RX ADMIN — Medication 1 PATCH: at 04:35

## 2024-09-13 RX ADMIN — ACETAMINOPHEN 400 MILLIGRAM(S): 325 TABLET ORAL at 21:40

## 2024-09-13 RX ADMIN — ONDANSETRON 4 MILLIGRAM(S): 2 INJECTION, SOLUTION INTRAMUSCULAR; INTRAVENOUS at 09:28

## 2024-09-13 RX ADMIN — Medication 200 MILLIGRAM(S): at 05:36

## 2024-09-13 RX ADMIN — TAMSULOSIN HYDROCHLORIDE 0.4 MILLIGRAM(S): 0.4 CAPSULE ORAL at 21:39

## 2024-09-13 RX ADMIN — ACETAMINOPHEN 400 MILLIGRAM(S): 325 TABLET ORAL at 12:02

## 2024-09-13 RX ADMIN — ACETAMINOPHEN 1000 MILLIGRAM(S): 325 TABLET ORAL at 05:50

## 2024-09-13 NOTE — PROGRESS NOTE ADULT - ASSESSMENT
A/P: 73y Male s/p R PCNL, L NT removal 9/12, s/p L PCNL and L stent 9/10    -AM labs  -AM CT  -possible right NT removal pending CT results  -recheck urine color and possible TOV/PVR  -f/u CXR final read  -continue cipro  -f/u kidney and stone cultures  -Analgesia and antiemetics as needed  -OOB/DVT ppx/IS A/P: 73y Male s/p R PCNL, L NT removal 9/12, s/p L PCNL and L stent 9/10    -AM labs  -AM CT  -possible right NT removal pending CT results  -recheck urine color and possible TOV/PVR  -f/u CXR final read  -continue cipro  -f/u kidney and stone cultures  -Analgesia and antiemetics as needed  -OOB/DVT ppx/IS      addendum: pt developed PAYAL and anemia from the procedure A/P: 73y Male s/p R PCNL, L NT removal 9/12, s/p L PCNL and L stent 9/10    -AM labs  -AM CT  -possible right NT removal pending CT results  -recheck urine color and possible TOV/PVR  -f/u CXR final read  -continue cipro  -f/u kidney and stone cultures  -Analgesia and antiemetics as needed  -OOB/DVT ppx/IS      addendum: pt creatinine was elevated- unclear if PAYAL or an incorrect value at this time.  Also with post op anemia.

## 2024-09-14 DIAGNOSIS — N17.9 ACUTE KIDNEY FAILURE, UNSPECIFIED: ICD-10-CM

## 2024-09-14 DIAGNOSIS — E87.1 HYPO-OSMOLALITY AND HYPONATREMIA: ICD-10-CM

## 2024-09-14 LAB
ANION GAP SERPL CALC-SCNC: 11 MMOL/L — SIGNIFICANT CHANGE UP (ref 5–17)
ANION GAP SERPL CALC-SCNC: 11 MMOL/L — SIGNIFICANT CHANGE UP (ref 5–17)
ANION GAP SERPL CALC-SCNC: 15 MMOL/L — SIGNIFICANT CHANGE UP (ref 5–17)
BUN SERPL-MCNC: 36 MG/DL — HIGH (ref 7–23)
BUN SERPL-MCNC: 36 MG/DL — HIGH (ref 7–23)
BUN SERPL-MCNC: 38 MG/DL — HIGH (ref 7–23)
CALCIUM SERPL-MCNC: 8.2 MG/DL — LOW (ref 8.4–10.5)
CALCIUM SERPL-MCNC: 8.3 MG/DL — LOW (ref 8.4–10.5)
CALCIUM SERPL-MCNC: 8.4 MG/DL — SIGNIFICANT CHANGE UP (ref 8.4–10.5)
CHLORIDE SERPL-SCNC: 91 MMOL/L — LOW (ref 96–108)
CHLORIDE SERPL-SCNC: 93 MMOL/L — LOW (ref 96–108)
CHLORIDE SERPL-SCNC: 94 MMOL/L — LOW (ref 96–108)
CO2 SERPL-SCNC: 19 MMOL/L — LOW (ref 22–31)
CO2 SERPL-SCNC: 20 MMOL/L — LOW (ref 22–31)
CO2 SERPL-SCNC: 21 MMOL/L — LOW (ref 22–31)
CREAT SERPL-MCNC: 3.07 MG/DL — HIGH (ref 0.5–1.3)
CREAT SERPL-MCNC: 3.15 MG/DL — HIGH (ref 0.5–1.3)
CREAT SERPL-MCNC: 3.42 MG/DL — HIGH (ref 0.5–1.3)
CULTURE RESULTS: NO GROWTH — SIGNIFICANT CHANGE UP
CULTURE RESULTS: SIGNIFICANT CHANGE UP
EGFR: 18 ML/MIN/1.73M2 — LOW
EGFR: 20 ML/MIN/1.73M2 — LOW
EGFR: 21 ML/MIN/1.73M2 — LOW
GLUCOSE BLDC GLUCOMTR-MCNC: 262 MG/DL — HIGH (ref 70–99)
GLUCOSE BLDC GLUCOMTR-MCNC: 287 MG/DL — HIGH (ref 70–99)
GLUCOSE BLDC GLUCOMTR-MCNC: 313 MG/DL — HIGH (ref 70–99)
GLUCOSE BLDC GLUCOMTR-MCNC: 321 MG/DL — HIGH (ref 70–99)
GLUCOSE BLDC GLUCOMTR-MCNC: 338 MG/DL — HIGH (ref 70–99)
GLUCOSE BLDC GLUCOMTR-MCNC: 355 MG/DL — HIGH (ref 70–99)
GLUCOSE SERPL-MCNC: 221 MG/DL — HIGH (ref 70–99)
GLUCOSE SERPL-MCNC: 238 MG/DL — HIGH (ref 70–99)
GLUCOSE SERPL-MCNC: 297 MG/DL — HIGH (ref 70–99)
HCT VFR BLD CALC: 24 % — LOW (ref 39–50)
HCT VFR BLD CALC: 27.2 % — LOW (ref 39–50)
HCT VFR BLD CALC: 29.9 % — LOW (ref 39–50)
HGB BLD-MCNC: 10.3 G/DL — LOW (ref 13–17)
HGB BLD-MCNC: 8.1 G/DL — LOW (ref 13–17)
HGB BLD-MCNC: 9.1 G/DL — LOW (ref 13–17)
MCHC RBC-ENTMCNC: 29 PG — SIGNIFICANT CHANGE UP (ref 27–34)
MCHC RBC-ENTMCNC: 29.6 PG — SIGNIFICANT CHANGE UP (ref 27–34)
MCHC RBC-ENTMCNC: 29.9 PG — SIGNIFICANT CHANGE UP (ref 27–34)
MCHC RBC-ENTMCNC: 33.5 GM/DL — SIGNIFICANT CHANGE UP (ref 32–36)
MCHC RBC-ENTMCNC: 33.8 GM/DL — SIGNIFICANT CHANGE UP (ref 32–36)
MCHC RBC-ENTMCNC: 34.4 GM/DL — SIGNIFICANT CHANGE UP (ref 32–36)
MCV RBC AUTO: 86.6 FL — SIGNIFICANT CHANGE UP (ref 80–100)
MCV RBC AUTO: 86.7 FL — SIGNIFICANT CHANGE UP (ref 80–100)
MCV RBC AUTO: 87.6 FL — SIGNIFICANT CHANGE UP (ref 80–100)
NRBC # BLD: 0 /100 WBCS — SIGNIFICANT CHANGE UP (ref 0–0)
PLATELET # BLD AUTO: 112 K/UL — LOW (ref 150–400)
PLATELET # BLD AUTO: 120 K/UL — LOW (ref 150–400)
PLATELET # BLD AUTO: 123 K/UL — LOW (ref 150–400)
POTASSIUM SERPL-MCNC: 4.6 MMOL/L — SIGNIFICANT CHANGE UP (ref 3.5–5.3)
POTASSIUM SERPL-MCNC: 4.6 MMOL/L — SIGNIFICANT CHANGE UP (ref 3.5–5.3)
POTASSIUM SERPL-MCNC: 4.7 MMOL/L — SIGNIFICANT CHANGE UP (ref 3.5–5.3)
POTASSIUM SERPL-SCNC: 4.6 MMOL/L — SIGNIFICANT CHANGE UP (ref 3.5–5.3)
POTASSIUM SERPL-SCNC: 4.6 MMOL/L — SIGNIFICANT CHANGE UP (ref 3.5–5.3)
POTASSIUM SERPL-SCNC: 4.7 MMOL/L — SIGNIFICANT CHANGE UP (ref 3.5–5.3)
RBC # BLD: 2.74 M/UL — LOW (ref 4.2–5.8)
RBC # BLD: 3.14 M/UL — LOW (ref 4.2–5.8)
RBC # BLD: 3.45 M/UL — LOW (ref 4.2–5.8)
RBC # FLD: 13.2 % — SIGNIFICANT CHANGE UP (ref 10.3–14.5)
RBC # FLD: 13.2 % — SIGNIFICANT CHANGE UP (ref 10.3–14.5)
RBC # FLD: 13.6 % — SIGNIFICANT CHANGE UP (ref 10.3–14.5)
SODIUM SERPL-SCNC: 122 MMOL/L — LOW (ref 135–145)
SODIUM SERPL-SCNC: 125 MMOL/L — LOW (ref 135–145)
SODIUM SERPL-SCNC: 128 MMOL/L — LOW (ref 135–145)
SPECIMEN SOURCE: SIGNIFICANT CHANGE UP
SPECIMEN SOURCE: SIGNIFICANT CHANGE UP
WBC # BLD: 10.67 K/UL — HIGH (ref 3.8–10.5)
WBC # BLD: 9.37 K/UL — SIGNIFICANT CHANGE UP (ref 3.8–10.5)
WBC # BLD: 9.79 K/UL — SIGNIFICANT CHANGE UP (ref 3.8–10.5)
WBC # FLD AUTO: 10.67 K/UL — HIGH (ref 3.8–10.5)
WBC # FLD AUTO: 9.37 K/UL — SIGNIFICANT CHANGE UP (ref 3.8–10.5)
WBC # FLD AUTO: 9.79 K/UL — SIGNIFICANT CHANGE UP (ref 3.8–10.5)

## 2024-09-14 RX ORDER — POLYETHYLENE GLYCOL 3350 17 G/17G
17 POWDER, FOR SOLUTION ORAL DAILY
Refills: 0 | Status: DISCONTINUED | OUTPATIENT
Start: 2024-09-14 | End: 2024-09-17

## 2024-09-14 RX ORDER — SODIUM CHLORIDE 9 MG/ML
1000 INJECTION INTRAMUSCULAR; INTRAVENOUS; SUBCUTANEOUS
Refills: 0 | Status: DISCONTINUED | OUTPATIENT
Start: 2024-09-14 | End: 2024-09-17

## 2024-09-14 RX ORDER — SODIUM PHOSPHATE, DIBASIC AND SODIUM PHOSPHATE, MONOBASIC 7; 19 G/230ML; G/230ML
1 ENEMA RECTAL ONCE
Refills: 0 | Status: COMPLETED | OUTPATIENT
Start: 2024-09-14 | End: 2024-09-14

## 2024-09-14 RX ORDER — SENNA 187 MG
2 TABLET ORAL AT BEDTIME
Refills: 0 | Status: DISCONTINUED | OUTPATIENT
Start: 2024-09-14 | End: 2024-09-17

## 2024-09-14 RX ORDER — FUROSEMIDE 40 MG
10 TABLET ORAL ONCE
Refills: 0 | Status: COMPLETED | OUTPATIENT
Start: 2024-09-14 | End: 2024-09-14

## 2024-09-14 RX ADMIN — Medication 2 TABLET(S): at 21:38

## 2024-09-14 RX ADMIN — TAMSULOSIN HYDROCHLORIDE 0.4 MILLIGRAM(S): 0.4 CAPSULE ORAL at 21:38

## 2024-09-14 RX ADMIN — SODIUM CHLORIDE 75 MILLILITER(S): 9 INJECTION INTRAMUSCULAR; INTRAVENOUS; SUBCUTANEOUS at 10:09

## 2024-09-14 RX ADMIN — Medication 5000 UNIT(S): at 13:57

## 2024-09-14 RX ADMIN — Medication 10 MILLIGRAM(S): at 12:09

## 2024-09-14 RX ADMIN — POLYETHYLENE GLYCOL 3350 17 GRAM(S): 17 POWDER, FOR SOLUTION ORAL at 12:10

## 2024-09-14 RX ADMIN — Medication 200 MILLIGRAM(S): at 17:40

## 2024-09-14 RX ADMIN — Medication 4: at 13:54

## 2024-09-14 RX ADMIN — OXYCODONE HYDROCHLORIDE 10 MILLIGRAM(S): 5 TABLET ORAL at 22:39

## 2024-09-14 RX ADMIN — SODIUM PHOSPHATE, DIBASIC AND SODIUM PHOSPHATE, MONOBASIC 1 ENEMA: 7; 19 ENEMA RECTAL at 18:20

## 2024-09-14 RX ADMIN — Medication 5000 UNIT(S): at 05:22

## 2024-09-14 RX ADMIN — OXYCODONE HYDROCHLORIDE 5 MILLIGRAM(S): 5 TABLET ORAL at 02:17

## 2024-09-14 RX ADMIN — OXYCODONE HYDROCHLORIDE 5 MILLIGRAM(S): 5 TABLET ORAL at 03:17

## 2024-09-14 RX ADMIN — Medication 1: at 22:47

## 2024-09-14 RX ADMIN — Medication 4: at 17:40

## 2024-09-14 RX ADMIN — Medication 5000 UNIT(S): at 21:39

## 2024-09-14 RX ADMIN — OXYCODONE HYDROCHLORIDE 10 MILLIGRAM(S): 5 TABLET ORAL at 21:39

## 2024-09-14 RX ADMIN — Medication 200 MILLIGRAM(S): at 05:20

## 2024-09-14 RX ADMIN — Medication 10 MILLIGRAM(S): at 12:03

## 2024-09-14 RX ADMIN — Medication 125 MILLILITER(S): at 02:18

## 2024-09-14 NOTE — CONSULT NOTE ADULT - ATTENDING COMMENTS
I have seen this patient with the fellow and agree with their assessment and plan. I was physically present for significant portions of the evaluation and management (E/M) service provided.  I agree with the above history, physical, and plan which I have reviewed and edited where appropriate.    see note from 9/15    Amanda Coronado MD  Office   Contact me directly via [employer] Teams     (After 5 pm or on weekends please page the on-call fellow/attending, can check [email or web address] for schedule. Login is carina ash, schedule under [facility], psych, derm)

## 2024-09-14 NOTE — CONSULT NOTE ADULT - SUBJECTIVE AND OBJECTIVE BOX
[facility] DIVISION OF KIDNEY DISEASES AND HYPERTENSION -- [phone]  -- INITIAL CONSULT NOTE  --------------------------------------------------------------------------------  HPI: Pt. is a 73 y.o. M w/ PMHx DM, HTN, and kidney stones who presented to [facility] for L PCN. Nephrology consulted for PAYAL and hyponatremia.    On review of [facility] HIE/[place], Scr 1.28, SNa WNL on 8/15/24. Admission Scr elevated at 1.76. Scr with downtrend but began to rise to 2.50 on 9/13/24. Scr increased to 3.15 today. CT A/P showed 3 punctuate nonobstructing intrarenal calculus remnants and right retroperitoneal hematoma and gas.    Pt. seen and examined. Pt. states that he feels comfortable now. He states that he was in a lot of pain yesterday and earlier today. Pt. has had kidney stones before but is unsure of what type they are. Previously underwent ESWL in [place] in the past. No fever, chills, CP, SOB, or LE swelling.    PAST HISTORY  --------------------------------------------------------------------------------  PAST MEDICAL & SURGICAL HISTORY:  HTN (hypertension)    BPH (benign prostatic hyperplasia)    Kidney stones    History of TB (tuberculosis)    Bacterial UTI    Bacterial septicemia    S/P cystoscopy    S/P cystoscopy with ureteral stent placement    H/O renal calculi    FAMILY HISTORY:    PAST SOCIAL HISTORY:    ALLERGIES & MEDICATIONS  --------------------------------------------------------------------------------  Allergies    cephalosporins (Other)    Intolerances    Standing Inpatient Medications  ciprofloxacin   IVPB 400 milliGRAM(s) IV Intermittent every 12 hours  dextrose 5%. 1000 milliLiter(s) IV Continuous <Continuous>  dextrose 5%. 1000 milliLiter(s) IV Continuous <Continuous>  dextrose 50% Injectable 25 Gram(s) IV Push once  dextrose 50% Injectable 25 Gram(s) IV Push once  heparin   Injectable 5000 Unit(s) SubCutaneous every 8 hours  influenza  Vaccine (HIGH DOSE) 0.5 milliLiter(s) IntraMuscular once  insulin lispro (ADMELOG) corrective regimen sliding scale   SubCutaneous three times a day before meals  insulin lispro (ADMELOG) corrective regimen sliding scale   SubCutaneous at bedtime  lidocaine   4% Patch 1 Patch Transdermal daily  polyethylene glycol 3350 17 Gram(s) Oral daily  senna 2 Tablet(s) Oral at bedtime  sodium chloride 0.9%. 1000 milliLiter(s) IV Continuous <Continuous>  tamsulosin 0.4 milliGRAM(s) Oral at bedtime    PRN Inpatient Medications  dextrose Oral Gel 15 Gram(s) Oral once PRN  oxyCODONE    IR 10 milliGRAM(s) Oral every 4 hours PRN  oxyCODONE    IR 5 milliGRAM(s) Oral every 4 hours PRN    REVIEW OF SYSTEMS  --------------------------------------------------------------------------------  Gen: No fevers/chills  Skin: No rash  Head/Eyes/Ears: No headache  Respiratory: No dyspnea  CV: No chest pain  GI: No abdominal pain  : + R PCN  MSK: No edema  Heme: No easy bruising or bleeding    All other systems were reviewed and are negative, except as noted.    VITALS/PHYSICAL EXAM  --------------------------------------------------------------------------------  T(C): 36.4 (09-14-24 @ 15:16), Max: 37.1 (09-14-24 @ 01:27)  HR: 70 (09-14-24 @ 15:16) (63 - 74)  BP: 165/81 (09-14-24 @ 15:16) (133/61 - 165/81)  RR: 18 (09-14-24 @ 15:16) (18 - 18)  SpO2: 99% (09-14-24 @ 15:16) (97% - 99%)  Wt(kg): --    09-13-24 @ 07:01  -  09-14-24 @ 07:00  --------------------------------------------------------  IN: 1000 mL / OUT: 2950 mL / NET: -1950 mL    09-14-24 @ 07:01  -  09-14-24 @ 21:06  --------------------------------------------------------  IN: 440 mL / OUT: 1765 mL / NET: -1325 mL    Physical Exam:  Gen: NAD  HEENT: MMM  Pulm: CTA B/L  CV: S1S2  Abd: Soft, +BS, + B/l PCNs  Ext: No LE edema B/L  Neuro: Awake  Skin: Warm and dry  Vascular access: Peripheral IVs    LABS/STUDIES  --------------------------------------------------------------------------------              10.3   9.37  >-----------<  112      [09-14-24 @ 19:49]              29.9     122  |  91  |  38  ----------------------------<  297      [09-14-24 @ 19:49]  4.6   |  20  |  3.42        Ca     8.3     [09-14-24 @ 19:49]    Creatinine Trend:  SCr 3.42 [09-14 @ 19:49]  SCr 3.15 [09-14 @ 07:42]  SCr 3.07 [09-14 @ 06:06]  SCr 2.50 [09-13 @ 11:29]  SCr 1.50 [09-12 @ 18:53]

## 2024-09-14 NOTE — CONSULT NOTE ADULT - PROBLEM SELECTOR PROBLEM 1
Hpi Title: Evaluation of Skin Lesions
Have Your Spot(S) Been Treated In The Past?: has not been treated
Additional History: Wart- L thumb,pt currently treating otc
PAYAL (acute kidney injury)

## 2024-09-14 NOTE — PROGRESS NOTE ADULT - ASSESSMENT
A/P: 73y Male s/p R PCNL, L NT removal 9/12, s/p L PCNL and L stent 9/10    -AM labs with elevated SCR 3.07, repeat 3.15, Hct 24  -2 uPRBC ordered with lasix in between  -F/u post transfusion cbc and evening BMP  -Keep lomeli for now   -Analgesia and antiemetics as needed  -Added miralax and dulcolax x1 to bowel regimen   -IVF-Switced from LR to NS for hyponatremia   -OOB/DVT ppx/IS

## 2024-09-14 NOTE — CONSULT NOTE ADULT - PROBLEM SELECTOR RECOMMENDATION 2
Pt with hyponatremia in the setting of decreased PO intake and ? ADH stimulation due to pain. On review of [facility] HIE/[place], SNa WNL on 8/15/24. SNa decreased to 130 (132 corrected) on 9/13/24. Serum Na with further worsening to 125 (127 corrected) and 122 (125 corrected) on repeat labs while receiving LR/NS. Pt. with ? SIADH.    Please obtain urine studies (osms and urine lytes). Optimize glucose control. Monitor SNa BID.    If you have any questions, please feel free to contact me  Riccardo Jenkins  Nephrology Fellow  Page [phone] / Microsoft Teams (Preferred)  (After 4pm or on weekends please page the on-call fellow)

## 2024-09-14 NOTE — CONSULT NOTE ADULT - PROBLEM SELECTOR RECOMMENDATION 9
Pt. with PAYAL s/p b/l PCN placement. On review of [facility]/[place], Scr 1.28, on 8/15/24. Admission Scr elevated at 1.76. Scr with downtrend but began to rise to 2.50 on 9/13/24. Scr increased to 3.15 today. CT A/P showed 3 punctuate nonobstructing intrarenal calculus remnants and right retroperitoneal hematoma and gas. Pt. with possible ATN?    Recommend to monitor labs and urine output. Currently, no indication for RRT. Avoid any potential nephrotoxins when possible and dose medications per eGFR.

## 2024-09-15 LAB
ANION GAP SERPL CALC-SCNC: 13 MMOL/L — SIGNIFICANT CHANGE UP (ref 5–17)
APPEARANCE UR: ABNORMAL
BACTERIA # UR AUTO: NEGATIVE /HPF — SIGNIFICANT CHANGE UP
BILIRUB UR-MCNC: ABNORMAL
BUN SERPL-MCNC: 41 MG/DL — HIGH (ref 7–23)
CALCIUM SERPL-MCNC: 8.3 MG/DL — LOW (ref 8.4–10.5)
CAST: 0 /LPF — SIGNIFICANT CHANGE UP (ref 0–4)
CHLORIDE SERPL-SCNC: 98 MMOL/L — SIGNIFICANT CHANGE UP (ref 96–108)
CO2 SERPL-SCNC: 20 MMOL/L — LOW (ref 22–31)
COLOR SPEC: ABNORMAL
CREAT ?TM UR-MCNC: 14 MG/DL — SIGNIFICANT CHANGE UP
CREAT SERPL-MCNC: 3.83 MG/DL — HIGH (ref 0.5–1.3)
DIFF PNL FLD: ABNORMAL
EGFR: 16 ML/MIN/1.73M2 — LOW
GLUCOSE BLDC GLUCOMTR-MCNC: 199 MG/DL — HIGH (ref 70–99)
GLUCOSE BLDC GLUCOMTR-MCNC: 282 MG/DL — HIGH (ref 70–99)
GLUCOSE BLDC GLUCOMTR-MCNC: 284 MG/DL — HIGH (ref 70–99)
GLUCOSE SERPL-MCNC: 184 MG/DL — HIGH (ref 70–99)
GLUCOSE UR QL: 100 MG/DL
HCT VFR BLD CALC: 28.3 % — LOW (ref 39–50)
HCT VFR BLD CALC: 28.3 % — LOW (ref 39–50)
HGB BLD-MCNC: 9.7 G/DL — LOW (ref 13–17)
HGB BLD-MCNC: 9.8 G/DL — LOW (ref 13–17)
KETONES UR-MCNC: NEGATIVE MG/DL — SIGNIFICANT CHANGE UP
LEUKOCYTE ESTERASE UR-ACNC: ABNORMAL
MCHC RBC-ENTMCNC: 29.8 PG — SIGNIFICANT CHANGE UP (ref 27–34)
MCHC RBC-ENTMCNC: 29.9 PG — SIGNIFICANT CHANGE UP (ref 27–34)
MCHC RBC-ENTMCNC: 34.3 GM/DL — SIGNIFICANT CHANGE UP (ref 32–36)
MCHC RBC-ENTMCNC: 34.6 GM/DL — SIGNIFICANT CHANGE UP (ref 32–36)
MCV RBC AUTO: 86.3 FL — SIGNIFICANT CHANGE UP (ref 80–100)
MCV RBC AUTO: 87.1 FL — SIGNIFICANT CHANGE UP (ref 80–100)
NITRITE UR-MCNC: POSITIVE
NRBC # BLD: 0 /100 WBCS — SIGNIFICANT CHANGE UP (ref 0–0)
NRBC # BLD: 0 /100 WBCS — SIGNIFICANT CHANGE UP (ref 0–0)
OSMOLALITY UR: 174 MOS/KG — LOW (ref 300–900)
PH UR: 7.5 — SIGNIFICANT CHANGE UP (ref 5–8)
PLATELET # BLD AUTO: 122 K/UL — LOW (ref 150–400)
PLATELET # BLD AUTO: 123 K/UL — LOW (ref 150–400)
POTASSIUM SERPL-MCNC: 4 MMOL/L — SIGNIFICANT CHANGE UP (ref 3.5–5.3)
POTASSIUM SERPL-SCNC: 4 MMOL/L — SIGNIFICANT CHANGE UP (ref 3.5–5.3)
POTASSIUM UR-SCNC: 6 MMOL/L — SIGNIFICANT CHANGE UP
PROT ?TM UR-MCNC: 165 MG/DL — HIGH (ref 0–12)
PROT UR-MCNC: 300 MG/DL
PROT/CREAT UR-RTO: 11.8 RATIO — HIGH (ref 0–0.2)
RBC # BLD: 3.25 M/UL — LOW (ref 4.2–5.8)
RBC # BLD: 3.28 M/UL — LOW (ref 4.2–5.8)
RBC # FLD: 12.9 % — SIGNIFICANT CHANGE UP (ref 10.3–14.5)
RBC # FLD: 13.1 % — SIGNIFICANT CHANGE UP (ref 10.3–14.5)
RBC CASTS # UR COMP ASSIST: >1900 /HPF — HIGH (ref 0–4)
SODIUM SERPL-SCNC: 131 MMOL/L — LOW (ref 135–145)
SODIUM UR-SCNC: 58 MMOL/L — SIGNIFICANT CHANGE UP
SP GR SPEC: 1.01 — SIGNIFICANT CHANGE UP (ref 1–1.03)
SQUAMOUS # UR AUTO: 2 /HPF — SIGNIFICANT CHANGE UP (ref 0–5)
UROBILINOGEN FLD QL: 0.2 MG/DL — SIGNIFICANT CHANGE UP (ref 0.2–1)
UUN UR-MCNC: 132 MG/DL — SIGNIFICANT CHANGE UP
WBC # BLD: 8.02 K/UL — SIGNIFICANT CHANGE UP (ref 3.8–10.5)
WBC # BLD: 8.09 K/UL — SIGNIFICANT CHANGE UP (ref 3.8–10.5)
WBC # FLD AUTO: 8.02 K/UL — SIGNIFICANT CHANGE UP (ref 3.8–10.5)
WBC # FLD AUTO: 8.09 K/UL — SIGNIFICANT CHANGE UP (ref 3.8–10.5)
WBC UR QL: 21 /HPF — HIGH (ref 0–5)

## 2024-09-15 PROCEDURE — 99222 1ST HOSP IP/OBS MODERATE 55: CPT

## 2024-09-15 RX ADMIN — SODIUM CHLORIDE 75 MILLILITER(S): 9 INJECTION INTRAMUSCULAR; INTRAVENOUS; SUBCUTANEOUS at 05:40

## 2024-09-15 RX ADMIN — Medication 1: at 18:13

## 2024-09-15 RX ADMIN — Medication 3: at 13:20

## 2024-09-15 RX ADMIN — Medication 5000 UNIT(S): at 21:46

## 2024-09-15 RX ADMIN — Medication 2 TABLET(S): at 21:47

## 2024-09-15 RX ADMIN — Medication 5000 UNIT(S): at 13:20

## 2024-09-15 RX ADMIN — Medication 1: at 21:45

## 2024-09-15 RX ADMIN — Medication 200 MILLIGRAM(S): at 05:38

## 2024-09-15 RX ADMIN — POLYETHYLENE GLYCOL 3350 17 GRAM(S): 17 POWDER, FOR SOLUTION ORAL at 13:20

## 2024-09-15 RX ADMIN — TAMSULOSIN HYDROCHLORIDE 0.4 MILLIGRAM(S): 0.4 CAPSULE ORAL at 21:48

## 2024-09-15 RX ADMIN — Medication 5000 UNIT(S): at 05:38

## 2024-09-15 RX ADMIN — Medication 200 MILLIGRAM(S): at 18:13

## 2024-09-15 NOTE — PROGRESS NOTE ADULT - ASSESSMENT
A/P: 73y Male s/p R PCNL, L NT removal 9/12, s/p L PCNL and L stent 9/10    - SCr this morning 3.8 from 3.4 yesterday   -Keep lomeli and NT for now   -Analgesia and antiemetics as needed  -f/u urine lytes  -Nephrology recs noted    -OOB/DVT ppx/IS

## 2024-09-15 NOTE — CHART NOTE - NSCHARTNOTEFT_GEN_A_CORE
Pt. with PAYAL s/p b/l PCN placement. On review of [facility]/[place], Scr 1.28, on 8/15/24. Admission Scr elevated at 1.76. Scr with downtrend but began to rise to 2.50 on 9/13/24. Scr increased to 3.15 today. CT A/P showed 3 punctuate nonobstructing intrarenal calculus remnants and right retroperitoneal hematoma and gas. Pt. with possible ATN?    Recommend to monitor labs and urine output. Currently, no indication for RRT. Avoid any potential nephrotoxins when possible and dose medications per eGFR.  Na improved now with internvetion but Crt continues to rise    Recent CT showing Left kidney: Status post removal percutaneous  nephrostomy. Left ureteral stent extending to urinary bladder. Residual   intrarenal gas and mild perinephric inflammation. Mild urothelial  thickening left renal pelvis  Right kidney: New percutaneous nephrostomy and ureteral stent extending  to distal ureter. At least 3 punctate intrarenal calculus fragments.   Renal pelvis distended with hemorrhagic material and gas with mild urothelial thickening. Perirenal inflammation/minimal hematoma  Right PCNL done on 9/13 and kidney stone culture, kidney culture  R clayman, lomeli catheter, left PCN removed, left internal stent still in place per OR note    May consider repeat imaging mainly of the left side( sono vs CT scan)  May need ?left intervention again? vs ATN    Amanda Coronado MD  Office   Contact me directly via Microsoft Teams     (After 5 pm or on weekends please page the on-call fellow/attending, can check [email or web address] for schedule. Login is carina ash, schedule under [facility] medicine, psych, derm)

## 2024-09-16 PROBLEM — N39.0 URINARY TRACT INFECTION, SITE NOT SPECIFIED: Chronic | Status: ACTIVE | Noted: 2024-08-28

## 2024-09-16 PROBLEM — Z86.11 PERSONAL HISTORY OF TUBERCULOSIS: Chronic | Status: ACTIVE | Noted: 2024-08-28

## 2024-09-16 LAB
ANION GAP SERPL CALC-SCNC: 11 MMOL/L — SIGNIFICANT CHANGE UP (ref 5–17)
BUN SERPL-MCNC: 41 MG/DL — HIGH (ref 7–23)
CALCIUM SERPL-MCNC: 9.1 MG/DL — SIGNIFICANT CHANGE UP (ref 8.4–10.5)
CHLORIDE SERPL-SCNC: 107 MMOL/L — SIGNIFICANT CHANGE UP (ref 96–108)
CO2 SERPL-SCNC: 20 MMOL/L — LOW (ref 22–31)
CREAT SERPL-MCNC: 2.67 MG/DL — HIGH (ref 0.5–1.3)
EGFR: 24 ML/MIN/1.73M2 — LOW
GLUCOSE BLDC GLUCOMTR-MCNC: 158 MG/DL — HIGH (ref 70–99)
GLUCOSE BLDC GLUCOMTR-MCNC: 202 MG/DL — HIGH (ref 70–99)
GLUCOSE BLDC GLUCOMTR-MCNC: 253 MG/DL — HIGH (ref 70–99)
GLUCOSE BLDC GLUCOMTR-MCNC: 388 MG/DL — HIGH (ref 70–99)
GLUCOSE BLDC GLUCOMTR-MCNC: 421 MG/DL — HIGH (ref 70–99)
GLUCOSE BLDC GLUCOMTR-MCNC: 423 MG/DL — HIGH (ref 70–99)
GLUCOSE SERPL-MCNC: 184 MG/DL — HIGH (ref 70–99)
HCT VFR BLD CALC: 27.8 % — LOW (ref 39–50)
HGB BLD-MCNC: 9.4 G/DL — LOW (ref 13–17)
MCHC RBC-ENTMCNC: 29.8 PG — SIGNIFICANT CHANGE UP (ref 27–34)
MCHC RBC-ENTMCNC: 33.8 GM/DL — SIGNIFICANT CHANGE UP (ref 32–36)
MCV RBC AUTO: 88.3 FL — SIGNIFICANT CHANGE UP (ref 80–100)
NRBC # BLD: 0 /100 WBCS — SIGNIFICANT CHANGE UP (ref 0–0)
PLATELET # BLD AUTO: 139 K/UL — LOW (ref 150–400)
POTASSIUM SERPL-MCNC: 4.2 MMOL/L — SIGNIFICANT CHANGE UP (ref 3.5–5.3)
POTASSIUM SERPL-SCNC: 4.2 MMOL/L — SIGNIFICANT CHANGE UP (ref 3.5–5.3)
RBC # BLD: 3.15 M/UL — LOW (ref 4.2–5.8)
RBC # FLD: 13.3 % — SIGNIFICANT CHANGE UP (ref 10.3–14.5)
SODIUM SERPL-SCNC: 138 MMOL/L — SIGNIFICANT CHANGE UP (ref 135–145)
SURGICAL PATHOLOGY STUDY: SIGNIFICANT CHANGE UP
WBC # BLD: 5.38 K/UL — SIGNIFICANT CHANGE UP (ref 3.8–10.5)
WBC # FLD AUTO: 5.38 K/UL — SIGNIFICANT CHANGE UP (ref 3.8–10.5)

## 2024-09-16 PROCEDURE — 99232 SBSQ HOSP IP/OBS MODERATE 35: CPT | Mod: GC

## 2024-09-16 PROCEDURE — 74176 CT ABD & PELVIS W/O CONTRAST: CPT | Mod: 26

## 2024-09-16 RX ORDER — INSULIN GLARGINE 100 [IU]/ML
15 INJECTION, SOLUTION SUBCUTANEOUS EVERY MORNING
Refills: 0 | Status: DISCONTINUED | OUTPATIENT
Start: 2024-09-17 | End: 2024-09-17

## 2024-09-16 RX ADMIN — Medication 5000 UNIT(S): at 21:59

## 2024-09-16 RX ADMIN — Medication 3: at 09:43

## 2024-09-16 RX ADMIN — TAMSULOSIN HYDROCHLORIDE 0.4 MILLIGRAM(S): 0.4 CAPSULE ORAL at 21:59

## 2024-09-16 RX ADMIN — Medication 5: at 16:20

## 2024-09-16 RX ADMIN — Medication 6: at 14:06

## 2024-09-16 RX ADMIN — Medication 200 MILLIGRAM(S): at 18:21

## 2024-09-16 RX ADMIN — Medication 1 PATCH: at 11:30

## 2024-09-16 RX ADMIN — Medication 2 TABLET(S): at 22:00

## 2024-09-16 RX ADMIN — Medication 5000 UNIT(S): at 06:26

## 2024-09-16 RX ADMIN — POLYETHYLENE GLYCOL 3350 17 GRAM(S): 17 POWDER, FOR SOLUTION ORAL at 11:30

## 2024-09-16 RX ADMIN — Medication 200 MILLIGRAM(S): at 06:25

## 2024-09-16 RX ADMIN — Medication 5000 UNIT(S): at 14:07

## 2024-09-16 RX ADMIN — Medication 2: at 18:21

## 2024-09-16 NOTE — PROGRESS NOTE ADULT - ASSESSMENT
A/P: 73y Male s/p R PCNL, L NT removal 9/12, s/p L PCNL and L stent 9/10,     - am labs   - cipro   - f/u CT   - poss tov after CT   - keep NT for now

## 2024-09-16 NOTE — PROGRESS NOTE ADULT - PROBLEM SELECTOR PLAN 1
Patient with PAYAL in the setting of nephrolithiasis and ?obstructive uropathy s/p B/L PCN placement. On review of [facility], Scr was 1.28, on 8/15/24. Admission Scr elevated at 1.76, peaked at 3.83, and improved to 2.67 today. UOP was 1.3L in the last 24 hours. CT A/P on 9/10/24 showed 3 punctuate non-obstructing intrarenal calculus remnants and right retroperitoneal hematoma and gas. Agree with IV NS @75ccs/hr. Urology following. Monitor labs and urine output. Avoid nephrotoxins. Dose medications as per eGFR. Patient with PAYAL in the setting of nephrolithiasis and ?obstructive uropathy s/p B/L PCN placement. On review of [facility], Scr was 1.28, on 8/15/24. Admission Scr elevated at 1.76, peaked at 3.83, and improved to 2.67 today. UOP was 1.3L in the last 24 hours. CT A/P on 9/10/24 showed 3 punctuate non-obstructing intrarenal calculus remnants and right retroperitoneal hematoma and gas. Currently has right sided PCN and L ureteral stent. Agree with IV NS @75ccs/hr. Urology following. Monitor labs and urine output. Avoid nephrotoxins. Dose medications as per eGFR.

## 2024-09-17 ENCOUNTER — TRANSCRIPTION ENCOUNTER (OUTPATIENT)
Age: 74
End: 2024-09-17

## 2024-09-17 VITALS
TEMPERATURE: 98 F | SYSTOLIC BLOOD PRESSURE: 144 MMHG | OXYGEN SATURATION: 99 % | DIASTOLIC BLOOD PRESSURE: 83 MMHG | RESPIRATION RATE: 18 BRPM | HEART RATE: 67 BPM

## 2024-09-17 LAB
ANION GAP SERPL CALC-SCNC: 14 MMOL/L — SIGNIFICANT CHANGE UP (ref 5–17)
BUN SERPL-MCNC: 35 MG/DL — HIGH (ref 7–23)
CALCIUM SERPL-MCNC: 9.1 MG/DL — SIGNIFICANT CHANGE UP (ref 8.4–10.5)
CHLORIDE SERPL-SCNC: 103 MMOL/L — SIGNIFICANT CHANGE UP (ref 96–108)
CO2 SERPL-SCNC: 18 MMOL/L — LOW (ref 22–31)
CREAT SERPL-MCNC: 2.17 MG/DL — HIGH (ref 0.5–1.3)
EGFR: 31 ML/MIN/1.73M2 — LOW
GLUCOSE BLDC GLUCOMTR-MCNC: 208 MG/DL — HIGH (ref 70–99)
GLUCOSE BLDC GLUCOMTR-MCNC: 277 MG/DL — HIGH (ref 70–99)
GLUCOSE SERPL-MCNC: 241 MG/DL — HIGH (ref 70–99)
HCT VFR BLD CALC: 29.6 % — LOW (ref 39–50)
HGB BLD-MCNC: 9.8 G/DL — LOW (ref 13–17)
MCHC RBC-ENTMCNC: 29.7 PG — SIGNIFICANT CHANGE UP (ref 27–34)
MCHC RBC-ENTMCNC: 33.1 GM/DL — SIGNIFICANT CHANGE UP (ref 32–36)
MCV RBC AUTO: 89.7 FL — SIGNIFICANT CHANGE UP (ref 80–100)
NRBC # BLD: 0 /100 WBCS — SIGNIFICANT CHANGE UP (ref 0–0)
PLATELET # BLD AUTO: 165 K/UL — SIGNIFICANT CHANGE UP (ref 150–400)
POTASSIUM SERPL-MCNC: 4.3 MMOL/L — SIGNIFICANT CHANGE UP (ref 3.5–5.3)
POTASSIUM SERPL-SCNC: 4.3 MMOL/L — SIGNIFICANT CHANGE UP (ref 3.5–5.3)
RBC # BLD: 3.3 M/UL — LOW (ref 4.2–5.8)
RBC # FLD: 13.4 % — SIGNIFICANT CHANGE UP (ref 10.3–14.5)
SODIUM SERPL-SCNC: 135 MMOL/L — SIGNIFICANT CHANGE UP (ref 135–145)
WBC # BLD: 4.64 K/UL — SIGNIFICANT CHANGE UP (ref 3.8–10.5)
WBC # FLD AUTO: 4.64 K/UL — SIGNIFICANT CHANGE UP (ref 3.8–10.5)

## 2024-09-17 PROCEDURE — 84133 ASSAY OF URINE POTASSIUM: CPT

## 2024-09-17 PROCEDURE — C1889: CPT

## 2024-09-17 PROCEDURE — 76000 FLUOROSCOPY <1 HR PHYS/QHP: CPT

## 2024-09-17 PROCEDURE — 86850 RBC ANTIBODY SCREEN: CPT

## 2024-09-17 PROCEDURE — 82570 ASSAY OF URINE CREATININE: CPT

## 2024-09-17 PROCEDURE — 81001 URINALYSIS AUTO W/SCOPE: CPT

## 2024-09-17 PROCEDURE — 82962 GLUCOSE BLOOD TEST: CPT

## 2024-09-17 PROCEDURE — 88300 SURGICAL PATH GROSS: CPT

## 2024-09-17 PROCEDURE — C1758: CPT

## 2024-09-17 PROCEDURE — 36430 TRANSFUSION BLD/BLD COMPNT: CPT

## 2024-09-17 PROCEDURE — 84540 ASSAY OF URINE/UREA-N: CPT

## 2024-09-17 PROCEDURE — 86923 COMPATIBILITY TEST ELECTRIC: CPT

## 2024-09-17 PROCEDURE — 84156 ASSAY OF PROTEIN URINE: CPT

## 2024-09-17 PROCEDURE — 83935 ASSAY OF URINE OSMOLALITY: CPT

## 2024-09-17 PROCEDURE — 74176 CT ABD & PELVIS W/O CONTRAST: CPT | Mod: MC

## 2024-09-17 PROCEDURE — 71045 X-RAY EXAM CHEST 1 VIEW: CPT

## 2024-09-17 PROCEDURE — 85027 COMPLETE CBC AUTOMATED: CPT

## 2024-09-17 PROCEDURE — C1726: CPT

## 2024-09-17 PROCEDURE — 36415 COLL VENOUS BLD VENIPUNCTURE: CPT

## 2024-09-17 PROCEDURE — 86900 BLOOD TYPING SEROLOGIC ABO: CPT

## 2024-09-17 PROCEDURE — 80048 BASIC METABOLIC PNL TOTAL CA: CPT

## 2024-09-17 PROCEDURE — C2625: CPT

## 2024-09-17 PROCEDURE — 85025 COMPLETE CBC W/AUTO DIFF WBC: CPT

## 2024-09-17 PROCEDURE — C9399: CPT

## 2024-09-17 PROCEDURE — P9016: CPT

## 2024-09-17 PROCEDURE — 86901 BLOOD TYPING SEROLOGIC RH(D): CPT

## 2024-09-17 PROCEDURE — 84300 ASSAY OF URINE SODIUM: CPT

## 2024-09-17 PROCEDURE — 99232 SBSQ HOSP IP/OBS MODERATE 35: CPT | Mod: GC

## 2024-09-17 PROCEDURE — 82365 CALCULUS SPECTROSCOPY: CPT

## 2024-09-17 PROCEDURE — C1769: CPT

## 2024-09-17 PROCEDURE — 87086 URINE CULTURE/COLONY COUNT: CPT

## 2024-09-17 PROCEDURE — 87070 CULTURE OTHR SPECIMN AEROBIC: CPT

## 2024-09-17 PROCEDURE — C1887: CPT

## 2024-09-17 RX ORDER — INSULIN GLARGINE 100 [IU]/ML
12 INJECTION, SOLUTION SUBCUTANEOUS
Qty: 0 | Refills: 0 | DISCHARGE

## 2024-09-17 RX ORDER — SULFAMETHOXAZOLE AND TRIMETHOPRIM 800; 160 MG/1; MG/1
1 TABLET ORAL
Refills: 0 | DISCHARGE

## 2024-09-17 RX ADMIN — Medication 3: at 09:24

## 2024-09-17 RX ADMIN — INSULIN GLARGINE 15 UNIT(S): 100 INJECTION, SOLUTION SUBCUTANEOUS at 09:24

## 2024-09-17 RX ADMIN — Medication 200 MILLIGRAM(S): at 05:36

## 2024-09-17 RX ADMIN — Medication 5000 UNIT(S): at 13:36

## 2024-09-17 RX ADMIN — Medication 2: at 13:36

## 2024-09-17 RX ADMIN — Medication 5000 UNIT(S): at 05:36

## 2024-09-17 NOTE — DISCHARGE NOTE PROVIDER - NSDCCPTREATMENT_GEN_ALL_CORE_FT
PRINCIPAL PROCEDURE  Procedure: Percutaneous left nephrolithotomy with fluoroscopic guidance using large C-arm  Findings and Treatment: Stage 1 Left percutaneous neprolithotomy on 09/10/24.      SECONDARY PROCEDURE  Procedure: Percutaneous right nephrolithotomy with fluoroscopic guidance using large C-arm  Findings and Treatment:

## 2024-09-17 NOTE — DISCHARGE NOTE PROVIDER - NSDCFUSCHEDAPPT_GEN_ALL_CORE_FT
Leticia Antunez  [facility]  UROLOGY [address] [place] [address] R  Scheduled Appointment: 09/30/2024    [address]  Scheduled Appointment: 10/02/2024    [facility]  UROLOGY [address] [place] [address] R  Scheduled Appointment: 10/09/2024    Leticia Antunez  [facility]  UROLOGY [address] [place] [address] R  Scheduled Appointment: 10/09/2024

## 2024-09-17 NOTE — DISCHARGE NOTE NURSING/CASE MANAGEMENT/SOCIAL WORK - NSDCPEFALRISK_GEN_ALL_CORE
For information on Fall & Injury Prevention, visit: [email or web address] OR  [email or web address] OR  https://www.cdc.gov/steadi/patient.html

## 2024-09-17 NOTE — PROGRESS NOTE ADULT - PROBLEM SELECTOR PLAN 2
Pt with hyponatremia in the setting of decreased PO intake, ?ADH stimulation, and ?obstructive uropathy. On review of [facility] HIE/[place], SNa WNL on 8/15/24. SNa decreased to 130 (132 corrected) on 9/13/24. Serum Na with further worsening to 122 (125 corrected). SNa now WNL and hyponatremia has resolved.     If you have any questions, please feel free to contact me.  Adal Estrada MD  Nephrology Fellow  [phone] / Microsoft Teams (Preferred)  (After 5pm or on weekends, please check the on-call schedule to reach the appropriate Nephrology Fellow)
Pt with hyponatremia in the setting of decreased PO intake, ?ADH stimulation, and ?obstructive uropathy. On review of [facility] HIE/[place], SNa WNL on 8/15/24. SNa decreased to 130 (132 corrected) on 9/13/24. Serum Na with further worsening to 122 (125 corrected). SNa now WNL and hyponatremia has resolved.     If you have any questions, please feel free to contact me.  Adal Estrada MD  Nephrology Fellow  [phone] / Microsoft Teams (Preferred)  (After 5pm or on weekends, please check the on-call schedule to reach the appropriate Nephrology Fellow)

## 2024-09-17 NOTE — PROGRESS NOTE ADULT - ASSESSMENT
73M  s/p L PCNL and L stent 9/10, R PCNL and L NT removal on 9/12, R NT removed at bedside 9/17.      Plan:  - reg diet   - continue cipro  - monitor vitals/I&Os  - OOB/ambulate  - DVT ppx  - dispo planning

## 2024-09-17 NOTE — DISCHARGE NOTE PROVIDER - CARE PROVIDER_API CALL
Leticia Antunez  Urology  [address] [place] [id number]  Phone: [phone]  Fax: [phone]  Follow Up Time: 2 weeks

## 2024-09-17 NOTE — DISCHARGE NOTE PROVIDER - HOSPITAL COURSE
73M PMH of T2DM, HTN, nephrolithiasis, BPH, admitted for scheduled procedure. s/p L PCNL and L stent Patient underwent a L PCNL and L stent placement on 9/10, tolerated it well. Patient then underwent a R PCNL and L NT removal on 9/12, tolerated it well. Nephrology was consulted for PAYAL and hyponatremia. C/F possible ATN.   Ultimately, sodium improved and Cr was downtrending. Bates catheter removed 9/16 and patient passed TOV. R NT removed at bedside 9/17.     At this time, patient is currently ambulating, voiding, tolerating a regular diet. Pain well controlled on PO pain meds. Patient felt safe with being discharged, and understood and agreed with plan. Per attending Dr. Antunez, patient is stable for discharge to home with outpatient follow up. Patient instructed to call office to make follow up appointment.

## 2024-09-17 NOTE — PROGRESS NOTE ADULT - ATTENDING COMMENTS
#samia  left ureteral stent   R PCN removed today 9/17, lomeli removed 9/17  microscopic hematuria  obstructive uropathy  cr downtrending pre removal   monitor cr trend   #hyponatremia  improving sodium  monitor trend  #urology follow up
#samia  left ureteral stent, R PCN  microscopic hematuria  obstructive uropathy  cr downtrending  montitor trend  #hyponatremia  improving sodium  monitor trend

## 2024-09-17 NOTE — PROGRESS NOTE ADULT - PROBLEM SELECTOR PLAN 1
Patient with PAYAL in the setting of nephrolithiasis and ?obstructive uropathy s/p B/L PCN placement. On review of [facility], Scr was 1.28, on 8/15/24. Admission Scr elevated at 1.76, peaked at 3.83, and improved to 2.17 today. UOP was 3.2L in the last 24 hours. CT A/P on 9/10/24 showed 3 punctuate non-obstructing intrarenal calculus remnants and right retroperitoneal hematoma and gas. Currently has L ureteral stent. Bates catheter and R PCN removed. Urology following. Monitor labs and urine output. Avoid nephrotoxins. Dose medications as per eGFR.

## 2024-09-17 NOTE — PROGRESS NOTE ADULT - SUBJECTIVE AND OBJECTIVE BOX
Post op Check    Pt seen and examined without complaints. Pain is controlled. Denies SOB/CP/N/V.     Vital Signs Last 24 Hrs  T(C): 36.3 (12 Sep 2024 22:00), Max: 36.9 (12 Sep 2024 13:46)  T(F): 97.3 (12 Sep 2024 22:00), Max: 98.4 (12 Sep 2024 13:46)  HR: 60 (12 Sep 2024 22:00) (57 - 68)  BP: 131/71 (12 Sep 2024 22:00) (111/60 - 173/84)  BP(mean): 82 (12 Sep 2024 20:30) (76 - 120)  RR: 18 (12 Sep 2024 22:00) (12 - 20)  SpO2: 96% (12 Sep 2024 22:00) (95% - 100%)    Parameters below as of 12 Sep 2024 22:00  Patient On (Oxygen Delivery Method): room air        I&O's Summary    11 Sep 2024 07:01  -  12 Sep 2024 07:00  --------------------------------------------------------  IN: 1750 mL / OUT: 6725 mL / NET: -4975 mL    12 Sep 2024 07:01  -  12 Sep 2024 22:37  --------------------------------------------------------  IN: 675 mL / OUT: 2025 mL / NET: -1350 mL        Physical Exam  Gen: NAD, A&Ox3  Pulm: No respiratory distress, no subcostal retractions  Abd: Soft, NT, ND  Back: R NT draining scant strawberry urine, L flank dressing c/d/i  : lomeli draining cherry urine                          10.1   6.81  )-----------( 141      ( 12 Sep 2024 18:53 )             30.8       09-12    137  |  104  |  20  ----------------------------<  173<H>  4.5   |  22  |  1.50<H>    Ca    8.6      12 Sep 2024 18:53        
 Post op Check    Pt seen and examined without complaints. Pain is controlled. Denies SOB/CP/N/V.   Abdominal pain.    Vital Signs Last 24 Hrs  T(C): 36.3 (10 Sep 2024 20:42), Max: 36.5 (10 Sep 2024 19:00)  T(F): 97.3 (10 Sep 2024 20:42), Max: 97.7 (10 Sep 2024 19:00)  HR: 66 (10 Sep 2024 20:42) (58 - 66)  BP: 133/67 (10 Sep 2024 20:42) (115/62 - 151/84)  BP(mean): 103 (10 Sep 2024 20:00) (83 - 103)  RR: 18 (10 Sep 2024 20:42) (16 - 18)  SpO2: 96% (10 Sep 2024 20:42) (96% - 99%)    Parameters below as of 10 Sep 2024 20:00  Patient On (Oxygen Delivery Method): room air        I&O's Summary    10 Sep 2024 07:01  -  10 Sep 2024 21:39  --------------------------------------------------------  IN: 0 mL / OUT: 1100 mL / NET: -1100 mL        Physical Exam  Gen: NAD, A&Ox3  Pulm: No respiratory distress, no subcostal retractions  CV: no JVD  Abd: abdominal distended, firm --> now soft, non-tender, non-distended post lomeli exchange   Back: L NT in place, draining well translucent punch colored urine  : lomeli catheter in place, not draining --> exchanged for 18F coude, irrigated to ensure appropriate drainage, now draining well translucent punch colored urine                           11.3   7.34  )-----------( 168      ( 10 Sep 2024 18:49 )             33.7       09-10    137  |  102  |  19  ----------------------------<  100<H>  4.4   |  21<L>  |  1.76<H>    Ca    8.6      10 Sep 2024 18:49          
The patient was seen and examined at bedside.  No acute events overnight and the patient is without acute complaints this AM.    T(C): 36.6 (09-13-24 @ 05:12), Max: 36.9 (09-12-24 @ 13:46)  HR: 61 (09-13-24 @ 05:12) (57 - 75)  BP: 139/72 (09-13-24 @ 05:12) (106/56 - 173/84)  RR: 18 (09-13-24 @ 05:12) (12 - 20)  SpO2: 97% (09-13-24 @ 05:12) (95% - 100%)  Wt(kg): --    Physical Exam:    General: NAD, A+Ox3  Abdomen: soft, non-tender, non-distended  Back: dressing clean/dry/intact bilaterally; no swelling or ecchymosis seen      09-12 @ 07:01  -  09-13 @ 07:00  --------------------------------------------------------  IN: 675 mL / OUT: 2575 mL / NET: -1900 mL      R NT - 250cc light red    F - 350cc punch colored  
UROLOGY DAILY PROGRESS NOTE:     Subjective: Patient seen and examined at bedside. Responded well to 2uPRBC yesterday.     Objective:  Vital signs  T(F): , Max: 98.8 (09-14-24 @ 21:12)  HR: 74 (09-15-24 @ 05:36)  BP: 161/77 (09-15-24 @ 05:36)  SpO2: 98% (09-15-24 @ 05:36)    I&O's Summary    14 Sep 2024 07:01  -  15 Sep 2024 07:00  --------------------------------------------------------  IN: 440 mL / OUT: 5260 mL / NET: -4820 mL    Gen: NAD  Pulm: No respiratory distress, no subcostal retractions  CV: RRR, no JVD  Abd: Soft, NT, ND  : Bates and R NT draining dilute clear red urine    Labs:  09-15  8.09  / 28.3  /3.83   09-14  9.37  / 29.9  /3.42                         9.7    8.09  )-----------( 122      ( 15 Sep 2024 06:18 )             28.3     09-15    131<L>  |  98  |  41<H>  ----------------------------<  184<H>  4.0   |  20<L>  |  3.83<H>    Ca    8.3<L>      15 Sep 2024 06:18   
UROLOGY DAILY PROGRESS NOTE:     Subjective: Patient seen and examined at bedside. No BM as of yet, passing flatus.     Objective:  Vital signs  T(F): , Max: 98.7 (09-14-24 @ 01:27)  HR: 69 (09-14-24 @ 09:05)  BP: 158/76 (09-14-24 @ 09:05)  SpO2: 99% (09-14-24 @ 09:05)  Wt(kg): --    I&O's Summary    13 Sep 2024 07:01  -  14 Sep 2024 07:00  --------------------------------------------------------  IN: 1000 mL / OUT: 2950 mL / NET: -1950 mL    14 Sep 2024 07:01  -  14 Sep 2024 12:12  --------------------------------------------------------  IN: 240 mL / OUT: 450 mL / NET: -210 mL    Gen: NAD  Pulm: No respiratory distress, no subcostal retractions  CV: RRR, no JVD  Abdomen: soft, non-tender, non-distended  Back: dressing clean/dry/intact bilaterally; no swelling or ecchymosis seen, dressing changed, NTs and lomeli draining red urine     Labs:  09-14  x     / x     /3.15   09-14  10.67 / 24.0  /3.07                           8.1    10.67 )-----------( 123      ( 14 Sep 2024 06:06 )             24.0     09-14    128<L>  |  94<L>  |  36<H>  ----------------------------<  238<H>  4.6   |  19<L>  |  3.15<H>    Ca    8.4      14 Sep 2024 07:42    
UROLOGY PA PROGRESS NOTE:     Subjective:  no acute events overnight    Objective:  Vital signs  T(F): , Max: 98.3 (09-15-24 @ 21:18)  HR: 60 (09-16-24 @ 05:26)  BP: 116/80 (09-16-24 @ 05:26)  SpO2: 97% (09-16-24 @ 05:26)  Wt(kg): --    Output     09-15 @ 07:01  -  09-16 @ 07:00  --------------------------------------------------------  IN: 1140 mL / OUT: 7200 mL / NET: -6060 mL        Physical Exam:  Gen: NAD  Abd: soft, NT/ND  BACK: right NT draining translucent cranberry, left flank incision CDI  : +lomeli draining pink     Labs:  09-16  5.38  / 27.8  /2.67   09-15  8.09  / 28.3  /3.83                           9.4    5.38  )-----------( 139      ( 16 Sep 2024 06:21 )             27.8     09-16    138  |  107  |  41<H>  ----------------------------<  184<H>  4.2   |  20<L>  |  2.67<H>    Ca    9.1      16 Sep 2024 06:21        Urinalysis Basic - ( 16 Sep 2024 06:21 )    Color: x / Appearance: x / SG: x / pH: x  Gluc: 184 mg/dL / Ketone: x  / Bili: x / Urobili: x   Blood: x / Protein: x / Nitrite: x   Leuk Esterase: x / RBC: x / WBC x   Sq Epi: x / Non Sq Epi: x / Bacteria: x        Urine Cx:        
UROLOGY PROGRESS NOTE:     Subjective: Patient seen and examined at bedside.   no major events overnight      Objective:  Vital signs  T(F): , Max: 98.6 (09-11-24 @ 21:08)  HR: 62 (09-12-24 @ 05:06)  BP: 133/68 (09-12-24 @ 05:06)  SpO2: 97% (09-12-24 @ 05:06)  Wt(kg): --    Output     I&O's Detail    10 Sep 2024 07:01  -  11 Sep 2024 07:00  --------------------------------------------------------  IN:    Lactated Ringers: 1125 mL    Oral Fluid: 320 mL  Total IN: 1445 mL    OUT:    Indwelling Catheter - Urethral (mL): 2650 mL    Nephrostomy Tube (mL): 250 mL    Voided (mL): 400 mL  Total OUT: 3300 mL    Total NET: -1855 mL      11 Sep 2024 07:01  -  12 Sep 2024 06:43  --------------------------------------------------------  IN:    Oral Fluid: 250 mL  Total IN: 250 mL    OUT:    Indwelling Catheter - Urethral (mL): 6200 mL    Nephrostomy Tube (mL): 525 mL  Total OUT: 6725 mL    Total NET: -6475 mL      Physical Exam:  Gen: no acute distress  Back: L PCN in place urine translucent red  Abd: soft nt nd  : lomeli in place urine light clear red    Labs:                        11.2   9.55  )-----------( 166      ( 11 Sep 2024 06:07 )             33.5     09-11    135  |  102  |  20  ----------------------------<  192<H>  4.8   |  23  |  1.56<H>    Ca    8.5      11 Sep 2024 06:07          
[facility] Division of Kidney Diseases & Hypertension  FOLLOW UP NOTE  [phone]--------------------------------------------------------------------------------    Chief Complaint: PAYAL and hyponatremia    24 hour events/subjective: Patient seen and examined this morning. Overall feels well. Has pink colored urine. Bates catheter and R PCN removed. Denies HA, fevers/chills, CP, SOB, abdominal pain, or LE swelling.    PAST HISTORY  --------------------------------------------------------------------------------  No significant changes to PMH, PSH, FHx, SHx, unless otherwise noted    ALLERGIES & MEDICATIONS  --------------------------------------------------------------------------------  Allergies  cephalosporins (Other)    Intolerances    Standing Inpatient Medications  ciprofloxacin   IVPB 400 milliGRAM(s) IV Intermittent every 12 hours  dextrose 5%. 1000 milliLiter(s) IV Continuous <Continuous>  dextrose 5%. 1000 milliLiter(s) IV Continuous <Continuous>  dextrose 50% Injectable 25 Gram(s) IV Push once  dextrose 50% Injectable 25 Gram(s) IV Push once  heparin   Injectable 5000 Unit(s) SubCutaneous every 8 hours  influenza  Vaccine (HIGH DOSE) 0.5 milliLiter(s) IntraMuscular once  insulin glargine Injectable (LANTUS) 15 Unit(s) SubCutaneous every morning  insulin lispro (ADMELOG) corrective regimen sliding scale   SubCutaneous three times a day before meals  insulin lispro (ADMELOG) corrective regimen sliding scale   SubCutaneous at bedtime  lidocaine   4% Patch 1 Patch Transdermal daily  polyethylene glycol 3350 17 Gram(s) Oral daily  senna 2 Tablet(s) Oral at bedtime  sodium chloride 0.9%. 1000 milliLiter(s) IV Continuous <Continuous>  tamsulosin 0.4 milliGRAM(s) Oral at bedtime    PRN Inpatient Medications  dextrose Oral Gel 15 Gram(s) Oral once PRN  oxyCODONE    IR 10 milliGRAM(s) Oral every 4 hours PRN  oxyCODONE    IR 5 milliGRAM(s) Oral every 4 hours PRN    REVIEW OF SYSTEMS  --------------------------------------------------------------------------------  Gen: No fevers/chills  Skin: No rash  Head/Eyes/Ears: No headache  Respiratory: No dyspnea  CV: No chest pain  GI: No abdominal pain  : Urinating well  MSK: No edema  Heme: No easy bruising or bleeding    All other systems were reviewed and are negative, except as noted.    VITALS/PHYSICAL EXAM  --------------------------------------------------------------------------------  T(C): 36.3 (09-17-24 @ 08:55), Max: 36.7 (09-16-24 @ 21:53)  HR: 71 (09-17-24 @ 08:55) (56 - 71)  BP: 121/71 (09-17-24 @ 08:55) (121/71 - 153/71)  RR: 18 (09-17-24 @ 08:55) (18 - 18)  SpO2: 95% (09-17-24 @ 08:55) (95% - 98%)  Wt(kg): --    09-16-24 @ 07:01  -  09-17-24 @ 07:00  --------------------------------------------------------  IN: 1940 mL / OUT: 3200 mL / NET: -1260 mL    Physical Exam:  Gen: NAD  HEENT: MMM  Pulm: CTA B/L  CV: S1S2  Abd: Soft, +BS  Ext: No LE edema B/L  Neuro: Awake  Skin: Warm and dry    LABS/STUDIES  --------------------------------------------------------------------------------              9.8    4.64  >-----------<  165      [09-17-24 @ 10:19]              29.6     135  |  103  |  35  ----------------------------<  241      [09-17-24 @ 10:19]  4.3   |  18  |  2.17        Ca     9.1     [09-17-24 @ 10:19]    Creatinine Trend:  SCr 2.17 [09-17 @ 10:19]  SCr 2.67 [09-16 @ 06:21]  SCr 3.83 [09-15 @ 06:18]  SCr 3.42 [09-14 @ 19:49]  SCr 3.15 [09-14 @ 07:42]    Urine Creatinine 14      [09-15-24 @ 08:14]  Urine Protein 165      [09-15-24 @ 08:14]  Urine Sodium 58      [09-15-24 @ 08:14]  Urine Urea Nitrogen 132      [09-15-24 @ 08:22]  Urine Potassium 6      [09-15-24 @ 08:14]  Urine Osmolality 174      [09-15-24 @ 08:14]
Subjective:  No acute overnight events.  Patient seen and examined at bedside with urology team during morning rounds.    Exam:  Physical Exam:  Gen: NAD  Abd: soft, NT/ND  BACK: right NT draining translucent cranberry, left flank incision CDI. Right NT removed at bedside  : voiding      T(C): 36.4 (09-17-24 @ 05:19), Max: 36.7 (09-16-24 @ 10:32)  HR: 56 (09-17-24 @ 05:19) (56 - 63)  BP: 149/67 (09-17-24 @ 05:19) (124/78 - 153/71)  RR: 18 (09-17-24 @ 05:19) (18 - 18)  SpO2: 98% (09-17-24 @ 05:19) (95% - 98%)      09-16-24 @ 07:01  -  09-17-24 @ 07:00  --------------------------------------------------------  IN: 1940 mL / OUT: 3200 mL / NET: -1260 mL        LABS:  cret                        9.4    5.38  )-----------( 139      ( 16 Sep 2024 06:21 )             27.8     09-16    138  |  107  |  41<H>  ----------------------------<  184<H>  4.2   |  20<L>  |  2.67<H>    Ca    9.1      16 Sep 2024 06:21            ciprofloxacin   IVPB 400 milliGRAM(s) IV Intermittent every 12 hours  dextrose 5%. 1000 milliLiter(s) IV Continuous <Continuous>  dextrose 5%. 1000 milliLiter(s) IV Continuous <Continuous>  dextrose 50% Injectable 25 Gram(s) IV Push once  dextrose 50% Injectable 25 Gram(s) IV Push once  dextrose Oral Gel 15 Gram(s) Oral once PRN  heparin   Injectable 5000 Unit(s) SubCutaneous every 8 hours  influenza  Vaccine (HIGH DOSE) 0.5 milliLiter(s) IntraMuscular once  insulin glargine Injectable (LANTUS) 15 Unit(s) SubCutaneous every morning  insulin lispro (ADMELOG) corrective regimen sliding scale   SubCutaneous three times a day before meals  insulin lispro (ADMELOG) corrective regimen sliding scale   SubCutaneous at bedtime  lidocaine   4% Patch 1 Patch Transdermal daily  oxyCODONE    IR 10 milliGRAM(s) Oral every 4 hours PRN  oxyCODONE    IR 5 milliGRAM(s) Oral every 4 hours PRN  polyethylene glycol 3350 17 Gram(s) Oral daily  senna 2 Tablet(s) Oral at bedtime  sodium chloride 0.9%. 1000 milliLiter(s) IV Continuous <Continuous>  tamsulosin 0.4 milliGRAM(s) Oral at bedtime     
UROLOGY PROGRESS NOTE:     Subjective: Patient seen and examined at bedside.   no major events overnight    Objective:  Vital signs  T(F): , Max: 97.7 (09-10-24 @ 19:00)  HR: 62 (09-11-24 @ 05:13)  BP: 133/70 (09-11-24 @ 05:13)  SpO2: 98% (09-11-24 @ 05:13)  Wt(kg): --    Output     I&O's Detail    10 Sep 2024 07:01  -  11 Sep 2024 07:00  --------------------------------------------------------  IN:    Lactated Ringers: 1125 mL    Oral Fluid: 320 mL  Total IN: 1445 mL    OUT:    Indwelling Catheter - Urethral (mL): 2650 mL    Nephrostomy Tube (mL): 250 mL    Voided (mL): 400 mL  Total OUT: 3300 mL    Total NET: -1855 mL      Physical Exam:  Gen: no acute distress  Back: L PCN in place, urine light clear red  Abd: soft nt nd  : lomeli in place, urine light red    Labs:                        11.2   9.55  )-----------( 166      ( 11 Sep 2024 06:07 )             33.5     09-11    135  |  102  |  20  ----------------------------<  192<H>  4.8   |  23  |  1.56<H>    Ca    8.5      11 Sep 2024 06:07            
[facility] Division of Kidney Diseases & Hypertension  FOLLOW UP NOTE  [phone]--------------------------------------------------------------------------------    Chief Complaint: PAYAL and hyponatremia    24 hour events/subjective: Patient seen and examined this morning. Overall feels well. Has pink colored urine. Denies HA, fevers/chills, CP, SOB, abdominal pain, or LE swelling.    PAST HISTORY  --------------------------------------------------------------------------------  No significant changes to PMH, PSH, FHx, SHx, unless otherwise noted    ALLERGIES & MEDICATIONS  --------------------------------------------------------------------------------  Allergies  cephalosporins (Other)    Intolerances    Standing Inpatient Medications  ciprofloxacin   IVPB 400 milliGRAM(s) IV Intermittent every 12 hours  dextrose 5%. 1000 milliLiter(s) IV Continuous <Continuous>  dextrose 5%. 1000 milliLiter(s) IV Continuous <Continuous>  dextrose 50% Injectable 25 Gram(s) IV Push once  dextrose 50% Injectable 25 Gram(s) IV Push once  heparin   Injectable 5000 Unit(s) SubCutaneous every 8 hours  influenza  Vaccine (HIGH DOSE) 0.5 milliLiter(s) IntraMuscular once  insulin lispro (ADMELOG) corrective regimen sliding scale   SubCutaneous three times a day before meals  insulin lispro (ADMELOG) corrective regimen sliding scale   SubCutaneous at bedtime  lidocaine   4% Patch 1 Patch Transdermal daily  polyethylene glycol 3350 17 Gram(s) Oral daily  senna 2 Tablet(s) Oral at bedtime  sodium chloride 0.9%. 1000 milliLiter(s) IV Continuous <Continuous>  tamsulosin 0.4 milliGRAM(s) Oral at bedtime    PRN Inpatient Medications  dextrose Oral Gel 15 Gram(s) Oral once PRN  oxyCODONE    IR 10 milliGRAM(s) Oral every 4 hours PRN  oxyCODONE    IR 5 milliGRAM(s) Oral every 4 hours PRN    REVIEW OF SYSTEMS  --------------------------------------------------------------------------------  Gen: No fevers/chills  Skin: No rash  Head/Eyes/Ears: No headache  Respiratory: No dyspnea  CV: No chest pain  GI: No abdominal pain  : +R PCN  MSK: No edema  Heme: No easy bruising or bleeding    All other systems were reviewed and are negative, except as noted.    VITALS/PHYSICAL EXAM  --------------------------------------------------------------------------------  T(C): 36.7 (09-16-24 @ 10:32), Max: 36.8 (09-15-24 @ 18:10)  HR: 60 (09-16-24 @ 10:32) (60 - 76)  BP: 124/78 (09-16-24 @ 10:32) (116/80 - 133/68)  RR: 18 (09-16-24 @ 10:32) (17 - 18)  SpO2: 96% (09-16-24 @ 10:32) (96% - 100%)  Wt(kg): --    09-15-24 @ 07:01  -  09-16-24 @ 07:00  --------------------------------------------------------  IN: 1140 mL / OUT: 7200 mL / NET: -6060 mL    Physical Exam:  Gen: NAD  HEENT: MMM  Pulm: CTA B/L  CV: S1S2  Abd: Soft, +BS, + Right PCN  : Bates catheter  Ext: No LE edema B/L  Neuro: Awake  Skin: Warm and dry    LABS/STUDIES  --------------------------------------------------------------------------------              9.4    5.38  >-----------<  139      [09-16-24 @ 06:21]              27.8     138  |  107  |  41  ----------------------------<  184      [09-16-24 @ 06:21]  4.2   |  20  |  2.67        Ca     9.1     [09-16-24 @ 06:21]    Creatinine Trend:  SCr 2.67 [09-16 @ 06:21]  SCr 3.83 [09-15 @ 06:18]  SCr 3.42 [09-14 @ 19:49]  SCr 3.15 [09-14 @ 07:42]  SCr 3.07 [09-14 @ 06:06]    Urine Creatinine 14      [09-15-24 @ 08:14]  Urine Protein 165      [09-15-24 @ 08:14]  Urine Sodium 58      [09-15-24 @ 08:14]  Urine Urea Nitrogen 132      [09-15-24 @ 08:22]  Urine Potassium 6      [09-15-24 @ 08:14]  Urine Osmolality 174      [09-15-24 @ 08:14]

## 2024-09-17 NOTE — DISCHARGE NOTE NURSING/CASE MANAGEMENT/SOCIAL WORK - PATIENT PORTAL LINK FT
You can access the FollowMyHealth Patient Portal offered by [facility] by registering at the following website: [email or web address]. By joining [facility]’s FollowMyHealth portal, you will also be able to view your health information using other applications (apps) compatible with our system.

## 2024-09-17 NOTE — DISCHARGE NOTE PROVIDER - NSDCMRMEDTOKEN_GEN_ALL_CORE_FT
Lantus Solostar Pen 100 units/mL subcutaneous solution: 12 unit(s) subcutaneous  Lantus Solostar Pen 100 units/mL subcutaneous solution: 19 unit(s) subcutaneous once a day (in the morning) to take 15 units in AM of surgery  metFORMIN 1000 mg oral tablet: 1 tab(s) orally 2 times a day  sulfamethoxazole-trimethoprim: 1 tab(s) orally 2 times a day  tamsulosin 0.4 mg oral capsule: 1 cap(s) orally once a day (at bedtime)   ciprofloxacin 500 mg oral tablet: 1 tab(s) orally 2 times a day  Lantus Solostar Pen 100 units/mL subcutaneous solution: 12 unit(s) subcutaneous once a day (at bedtime)  Lantus Solostar Pen 100 units/mL subcutaneous solution: 19 unit(s) subcutaneous once a day (in the morning) to take 15 units in AM of surgery  metFORMIN 1000 mg oral tablet: 1 tab(s) orally 2 times a day  tamsulosin 0.4 mg oral capsule: 1 cap(s) orally once a day (at bedtime)

## 2024-09-19 LAB — SURGICAL PATHOLOGY STUDY: SIGNIFICANT CHANGE UP

## 2024-09-23 LAB
CELL MATERIAL STONE EST-MCNT: SIGNIFICANT CHANGE UP
LABORATORY COMMENT REPORT: SIGNIFICANT CHANGE UP
NIDUS STONE QN: SIGNIFICANT CHANGE UP

## 2024-09-30 ENCOUNTER — NON-APPOINTMENT (OUTPATIENT)
Age: 74
End: 2024-09-30

## 2024-09-30 ENCOUNTER — APPOINTMENT (OUTPATIENT)
Dept: UROLOGY | Facility: CLINIC | Age: 74
End: 2024-09-30
Payer: MEDICARE

## 2024-09-30 VITALS
BODY MASS INDEX: 18.35 KG/M2 | SYSTOLIC BLOOD PRESSURE: 134 MMHG | RESPIRATION RATE: 17 BRPM | TEMPERATURE: 97.6 F | WEIGHT: 137 LBS | HEIGHT: 72.5 IN | HEART RATE: 76 BPM | DIASTOLIC BLOOD PRESSURE: 75 MMHG

## 2024-09-30 PROBLEM — A41.9 SEPSIS, UNSPECIFIED ORGANISM: Chronic | Status: ACTIVE | Noted: 2024-08-28

## 2024-09-30 PROCEDURE — 99024 POSTOP FOLLOW-UP VISIT: CPT

## 2024-09-30 NOTE — ASSESSMENT
[FreeTextEntry1] : 2x24 hr urines ordered Renal sono in 1 month after stent removal f/u 2 months to review results  Cystoscopy Left ureteral stent removal in 2 weeks in office BMP, CBC drawn

## 2024-09-30 NOTE — HISTORY OF PRESENT ILLNESS
[FreeTextEntry1] : 74y/o man s/p bilateral PCNLs and left ureteral stent insertion 9/10/24 and 9/12/24 stone analysis: LEFT : 80% Calcium oxalate monohydrate. 20% Calcium phosphate (apatite). RIGHT : 80% Calcium oxalate monohydrate.,10% Calcium oxalate dihydrate and10% Calcium phosphate (apatite).  transient PAYAL post op, resolving at time of discharge  bilateral nephrostomy sites healed well

## 2024-10-02 ENCOUNTER — APPOINTMENT (OUTPATIENT)
Dept: ENDOCRINOLOGY | Facility: CLINIC | Age: 74
End: 2024-10-02
Payer: MEDICARE

## 2024-10-02 VITALS
DIASTOLIC BLOOD PRESSURE: 72 MMHG | HEART RATE: 65 BPM | SYSTOLIC BLOOD PRESSURE: 120 MMHG | WEIGHT: 130 LBS | BODY MASS INDEX: 17.42 KG/M2 | OXYGEN SATURATION: 98 % | HEIGHT: 72.5 IN

## 2024-10-02 DIAGNOSIS — E11.9 TYPE 2 DIABETES MELLITUS W/OUT COMPLICATIONS: ICD-10-CM

## 2024-10-02 LAB — HBA1C MFR BLD HPLC: 6.9

## 2024-10-02 PROCEDURE — 99214 OFFICE O/P EST MOD 30 MIN: CPT

## 2024-10-02 PROCEDURE — 83036 HEMOGLOBIN GLYCOSYLATED A1C: CPT | Mod: QW

## 2024-10-02 RX ORDER — LINAGLIPTIN 5 MG/1
5 TABLET, FILM COATED ORAL DAILY
Qty: 1 | Refills: 0 | Status: ACTIVE | COMMUNITY
Start: 2024-10-02 | End: 1900-01-01

## 2024-10-02 NOTE — REVIEW OF SYSTEMS
[Recent Weight Loss (___ Lbs)] : recent weight loss: [unfilled] lbs [As Noted in HPI] : as noted in HPI [Polyuria] : polyuria [Eye Pain] : no pain [Blurred Vision] : no blurred vision [Dysphagia] : no dysphagia [Neck Pain] : no neck pain [Dysphonia] : no dysphonia [Chest Pain] : no chest pain [Palpitations] : no palpitations [Shortness Of Breath] : no shortness of breath [Nausea] : no nausea [Abdominal Pain] : no abdominal pain [Vomiting] : no vomiting [Pain/Numbness of Digits] : no pain/numbness of digits [Polydipsia] : no polydipsia

## 2024-10-02 NOTE — PHYSICAL EXAM
[Alert] : alert [No Acute Distress] : no acute distress [Well Developed] : well developed [Normal Sclera/Conjunctiva] : normal sclera/conjunctiva [No Proptosis] : no proptosis [No Accessory Muscle Use] : no accessory muscle use [Normal Rate and Effort] : normal respiratory rate and effort [Normal Gait] : normal gait [No Involuntary Movements] : no involuntary movements were seen [Oriented x3] : oriented to person, place, and time [Normal Affect] : the affect was normal [No Respiratory Distress] : no respiratory distress

## 2024-10-02 NOTE — ASSESSMENT
[FreeTextEntry1] : The patient is a 73 year old male being seen in the office today for evaluation of diabetes. PMH of T2DM. Recent admission to the hospital with urosepsis due to L obstructive kidney stone and bacteremia. S/p failed stenting with urology. S/p Percutaneous nephrostomy tube L with IR. Found to be in HHS on arrival, briefly on insulin gtt, transitioned to basal/bolus insulin. Endocrinology was consulted. Still on IV antibiotics.   T2DM - A1c: 10.7% 7/2024 Current home regimen: Lantus 15 units every morning Metformin 1000mg twice daily - Plan: Continue Lantus 15 units once a a day, stop metformin last gfr 43 9/3/2024. Start Tradjenta 5mg daily. Can consider restarting metformin in the future if GFR improves/normalizes. May need to reduce Lantus dose further but will re-evaluate once patient off metformin. Discussed hypoglycemia protocol, call office with highs/lows in BG. - Labs: will obtain c-peptide, lipid profile, will hold off on Urine ACR until s/p IV treatment for urosepsis is complete - Retinopathy screening: overdue, recommend yearly dilated eye exam  - Counseling: We discussed diabetes foot care, long term complications of diabetes including but not limited to neuropathy, nephropathy, retinopathy and cardiovascular disease and the benefits of good glycemic control in preventing said complications. We discussed the risks and benefits of diabetes medications and/or insulin as relevant for today, prevention and management of hypoglycemia, importance of medication compliance and blood glucose monitoring. - Discussed diabetic diet, decreased intake of simple/processed sugars, increase intake of whole foods with protein and fiber. Increase physical activity as tolerated with cardiovascular exercise 150 min/per week consistently and resistance exercise three days per week.  - I discussed the importance of avoiding mild hypoglycemia (FS<70 mg/dl) and severe hypoglycemia (FS<55 mg/dl) with the patient. I also discussed hypoglycemia correction with 4 oz of juice or 4 glucose tablets and rechecking FS in 15 minutes. If FS is still low, repeat 4oz juice or 4 glucose tablets and consume 12 grams of carbohydrates.  Recommend CDE evaluation 6 week follow up with me Has appointment with Dr. Gracia scheduled for 2/2025

## 2024-10-02 NOTE — HISTORY OF PRESENT ILLNESS
[FreeTextEntry1] : The patient is a 73 year old male being seen in the office today for evaluation of diabetes. PMH of T2DM. Recent admission to the hospital with urosepsis due to L obstructive kidney stone and bacteremia. S/p failed stenting with urology. S/p Percutaneous nephrostomy tube L with IR. Found to be in HHS on arrival, briefly on insulin gtt, transitioned to basal/bolus insulin. Endocrinology was consulted. Still on IV antibiotics. Patient accompanied by his wife for office visit. Persian  used, language line: Chiquita [id number]  States he was recently re-admitted to the hospital, per hospital note: admitted for scheduled procedure. s/p L PCNL and L stent Patient underwent a L PCNL and L stent placement on 9/10. Patient then underwent a R PCNL and L NT removal on 9/12. Had PAYAL and hyponatremia. C/F possible ATN.  Per urology note 9/30/2024: pt pending cystoscopy Left ureteral stent removal in 2 weeks in office GFR 43 on 9/30/2024 labs  T2DM dx 15 years ago  A1c: 10.7% 7/2024, today 6.9%  Current home regimen: Lantus 15 units every morning Metformin 1000mg twice daily Past medications: Glyburide-Metformin 5mg-500mg BID Compliance: yes   Diabetes complications: Microvascular: [-] neuropathy, [-] retinopathy Macrovascular: [-] CAD, [-] CVA, [-] PAD Patient unsure of his baseline GFR, previous GFR 57 History of HHS once as above, denies dka  Last retinopathy screening: has not seen optho for many years, denies retinopathy  Denies active issues with feet   Fingersticks 3x/day, log book shows:  BG Trends: - Before Breakfast: 95, 80, 96, 86, 128, 91, 74, 103, 109, 112, 107 2 hours after dinner: 175, 230, 175, 160, 173, 176, 178, 169, 186, 260, 183  Hypoglycemia events: as above, has symptoms: weakness, corrects with juice  Diet: was eating carb heavy previously, eats rice with all 3 meals but has greatly reduced his rice intake, increased veggie intake Snacks: blueberries, melon Exercise: walks 5 miles a day  Steroid intake: denies  Family history of diabetes: daughter with gestational diabetes, paternal uncle  Social history: lives with family, retired, former smoker, no etoh intake   Denies blurry vision or polydypsia, + polyuria but states has improved

## 2024-10-04 LAB
ANION GAP SERPL CALC-SCNC: 14 MMOL/L
BASOPHILS # BLD AUTO: 0.02 K/UL
BASOPHILS NFR BLD AUTO: 0.4 %
BUN SERPL-MCNC: 30 MG/DL
CALCIUM SERPL-MCNC: 9.3 MG/DL
CHLORIDE SERPL-SCNC: 103 MMOL/L
CO2 SERPL-SCNC: 23 MMOL/L
CREAT SERPL-MCNC: 1.68 MG/DL
EGFR: 43 ML/MIN/1.73M2
EOSINOPHIL # BLD AUTO: 0.07 K/UL
EOSINOPHIL NFR BLD AUTO: 1.4 %
GLUCOSE SERPL-MCNC: 149 MG/DL
HCT VFR BLD CALC: 35.3 %
HGB BLD-MCNC: 11.2 G/DL
IMM GRANULOCYTES NFR BLD AUTO: 0.2 %
LYMPHOCYTES # BLD AUTO: 1.71 K/UL
LYMPHOCYTES NFR BLD AUTO: 34.1 %
MAN DIFF?: NORMAL
MCHC RBC-ENTMCNC: 29.5 PG
MCHC RBC-ENTMCNC: 31.7 GM/DL
MCV RBC AUTO: 92.9 FL
MONOCYTES # BLD AUTO: 0.24 K/UL
MONOCYTES NFR BLD AUTO: 4.8 %
NEUTROPHILS # BLD AUTO: 2.96 K/UL
NEUTROPHILS NFR BLD AUTO: 59.1 %
PLATELET # BLD AUTO: 318 K/UL
POTASSIUM SERPL-SCNC: 4.7 MMOL/L
RBC # BLD: 3.8 M/UL
RBC # FLD: 13.2 %
SODIUM SERPL-SCNC: 140 MMOL/L
WBC # FLD AUTO: 5.01 K/UL

## 2024-10-07 LAB
ALBUMIN SERPL ELPH-MCNC: 4.5 G/DL
ALP BLD-CCNC: 78 U/L
ALT SERPL-CCNC: 11 U/L
ANION GAP SERPL CALC-SCNC: 11 MMOL/L
AST SERPL-CCNC: 18 U/L
BILIRUB SERPL-MCNC: 0.3 MG/DL
BUN SERPL-MCNC: 24 MG/DL
C PEPTIDE SERPL-MCNC: 1.8 NG/ML
CALCIUM SERPL-MCNC: 9.6 MG/DL
CHLORIDE SERPL-SCNC: 105 MMOL/L
CO2 SERPL-SCNC: 24 MMOL/L
CREAT SERPL-MCNC: 1.59 MG/DL
EGFR: 46 ML/MIN/1.73M2
GLUCOSE SERPL-MCNC: 145 MG/DL
PANC ISLET CELL AB SER QL: NORMAL
POTASSIUM SERPL-SCNC: 4.8 MMOL/L
PROT SERPL-MCNC: 7.8 G/DL
SODIUM SERPL-SCNC: 139 MMOL/L

## 2024-10-09 ENCOUNTER — APPOINTMENT (OUTPATIENT)
Dept: UROLOGY | Facility: CLINIC | Age: 74
End: 2024-10-09
Payer: MEDICARE

## 2024-10-09 ENCOUNTER — OUTPATIENT (OUTPATIENT)
Dept: OUTPATIENT SERVICES | Facility: HOSPITAL | Age: 74
LOS: 1 days | End: 2024-10-09
Payer: MEDICARE

## 2024-10-09 VITALS — SYSTOLIC BLOOD PRESSURE: 184 MMHG | DIASTOLIC BLOOD PRESSURE: 81 MMHG | HEART RATE: 60 BPM

## 2024-10-09 DIAGNOSIS — N20.0 CALCULUS OF KIDNEY: ICD-10-CM

## 2024-10-09 DIAGNOSIS — R35.0 FREQUENCY OF MICTURITION: ICD-10-CM

## 2024-10-09 DIAGNOSIS — Z98.890 OTHER SPECIFIED POSTPROCEDURAL STATES: Chronic | ICD-10-CM

## 2024-10-09 DIAGNOSIS — Z96.0 PRESENCE OF UROGENITAL IMPLANTS: Chronic | ICD-10-CM

## 2024-10-09 DIAGNOSIS — Z87.442 PERSONAL HISTORY OF URINARY CALCULI: Chronic | ICD-10-CM

## 2024-10-09 LAB
GAD65 AB SER-MCNC: 0 NMOL/L
ISLET CELL512 AB SER-SCNC: 0 NMOL/L

## 2024-10-09 PROCEDURE — 52310 CYSTOSCOPY AND TREATMENT: CPT | Mod: 58

## 2024-10-09 PROCEDURE — 52310 CYSTOSCOPY AND TREATMENT: CPT

## 2024-10-10 DIAGNOSIS — N20.0 CALCULUS OF KIDNEY: ICD-10-CM

## 2024-10-14 DIAGNOSIS — E11.9 TYPE 2 DIABETES MELLITUS W/OUT COMPLICATIONS: ICD-10-CM

## 2024-10-14 RX ORDER — PEN NEEDLE, DIABETIC 32GX 5/32"
32G X 4 MM NEEDLE, DISPOSABLE MISCELLANEOUS
Qty: 4 | Refills: 3 | Status: ACTIVE | COMMUNITY
Start: 2024-10-14 | End: 1900-01-01

## 2024-10-31 ENCOUNTER — NON-APPOINTMENT (OUTPATIENT)
Age: 74
End: 2024-10-31

## 2024-11-26 ENCOUNTER — APPOINTMENT (OUTPATIENT)
Dept: ULTRASOUND IMAGING | Facility: CLINIC | Age: 74
End: 2024-11-26
Payer: MEDICARE

## 2024-11-26 ENCOUNTER — OUTPATIENT (OUTPATIENT)
Dept: OUTPATIENT SERVICES | Facility: HOSPITAL | Age: 74
LOS: 1 days | End: 2024-11-26
Payer: MEDICARE

## 2024-11-26 DIAGNOSIS — Z96.0 PRESENCE OF UROGENITAL IMPLANTS: Chronic | ICD-10-CM

## 2024-11-26 DIAGNOSIS — Z98.890 OTHER SPECIFIED POSTPROCEDURAL STATES: Chronic | ICD-10-CM

## 2024-11-26 DIAGNOSIS — N20.0 CALCULUS OF KIDNEY: ICD-10-CM

## 2024-11-26 DIAGNOSIS — Z87.442 PERSONAL HISTORY OF URINARY CALCULI: Chronic | ICD-10-CM

## 2024-11-26 PROCEDURE — 76770 US EXAM ABDO BACK WALL COMP: CPT

## 2024-11-26 PROCEDURE — 76770 US EXAM ABDO BACK WALL COMP: CPT | Mod: 26

## 2024-12-02 ENCOUNTER — APPOINTMENT (OUTPATIENT)
Dept: ENDOCRINOLOGY | Facility: CLINIC | Age: 74
End: 2024-12-02
Payer: MEDICARE

## 2024-12-02 VITALS
WEIGHT: 145 LBS | BODY MASS INDEX: 19.64 KG/M2 | SYSTOLIC BLOOD PRESSURE: 140 MMHG | DIASTOLIC BLOOD PRESSURE: 72 MMHG | HEIGHT: 72 IN | OXYGEN SATURATION: 98 % | HEART RATE: 60 BPM

## 2024-12-02 DIAGNOSIS — E11.9 TYPE 2 DIABETES MELLITUS W/OUT COMPLICATIONS: ICD-10-CM

## 2024-12-02 PROCEDURE — 99214 OFFICE O/P EST MOD 30 MIN: CPT

## 2025-01-06 ENCOUNTER — RX RENEWAL (OUTPATIENT)
Age: 75
End: 2025-01-06

## 2025-01-06 RX ORDER — LANCETS 33 GAUGE
EACH MISCELLANEOUS
Qty: 300 | Refills: 2 | Status: ACTIVE | COMMUNITY
Start: 2025-01-06 | End: 1900-01-01

## 2025-01-25 ENCOUNTER — INPATIENT (INPATIENT)
Facility: HOSPITAL | Age: 75
LOS: 3 days | Discharge: HOME HEALTH | DRG: 204 | End: 2025-01-29
Attending: INTERNAL MEDICINE | Admitting: INTERNAL MEDICINE
Payer: MEDICARE

## 2025-01-25 VITALS
SYSTOLIC BLOOD PRESSURE: 107 MMHG | RESPIRATION RATE: 20 BRPM | OXYGEN SATURATION: 92 % | DIASTOLIC BLOOD PRESSURE: 69 MMHG | HEART RATE: 88 BPM | TEMPERATURE: 100 F

## 2025-01-25 DIAGNOSIS — R06.02 SHORTNESS OF BREATH: ICD-10-CM

## 2025-01-25 DIAGNOSIS — Z98.890 OTHER SPECIFIED POSTPROCEDURAL STATES: Chronic | ICD-10-CM

## 2025-01-25 DIAGNOSIS — Z96.0 PRESENCE OF UROGENITAL IMPLANTS: Chronic | ICD-10-CM

## 2025-01-25 DIAGNOSIS — Z87.442 PERSONAL HISTORY OF URINARY CALCULI: Chronic | ICD-10-CM

## 2025-01-25 LAB
ADD ON TEST-SPECIMEN IN LAB: SIGNIFICANT CHANGE UP
ALBUMIN SERPL ELPH-MCNC: 3 G/DL — LOW (ref 3.3–5)
ALP SERPL-CCNC: 77 U/L — SIGNIFICANT CHANGE UP (ref 40–120)
ALT FLD-CCNC: 11 U/L — SIGNIFICANT CHANGE UP (ref 10–45)
ANION GAP SERPL CALC-SCNC: 15 MMOL/L — SIGNIFICANT CHANGE UP (ref 5–17)
AST SERPL-CCNC: 18 U/L — SIGNIFICANT CHANGE UP (ref 10–40)
BASOPHILS # BLD AUTO: 0 K/UL — SIGNIFICANT CHANGE UP (ref 0–0.2)
BASOPHILS NFR BLD AUTO: 0 % — SIGNIFICANT CHANGE UP (ref 0–2)
BILIRUB SERPL-MCNC: 1.8 MG/DL — HIGH (ref 0.2–1.2)
BUN SERPL-MCNC: 34 MG/DL — HIGH (ref 7–23)
CALCIUM SERPL-MCNC: 8.9 MG/DL — SIGNIFICANT CHANGE UP (ref 8.4–10.5)
CHLORIDE SERPL-SCNC: 92 MMOL/L — LOW (ref 96–108)
CO2 SERPL-SCNC: 20 MMOL/L — LOW (ref 22–31)
CREAT SERPL-MCNC: 1.78 MG/DL — HIGH (ref 0.5–1.3)
EGFR: 40 ML/MIN/1.73M2 — LOW
EOSINOPHIL # BLD AUTO: 0 K/UL — SIGNIFICANT CHANGE UP (ref 0–0.5)
EOSINOPHIL NFR BLD AUTO: 0 % — SIGNIFICANT CHANGE UP (ref 0–6)
FLUAV AG NPH QL: DETECTED
FLUBV AG NPH QL: SIGNIFICANT CHANGE UP
GAS PNL BLDV: SIGNIFICANT CHANGE UP
GIANT PLATELETS BLD QL SMEAR: PRESENT — SIGNIFICANT CHANGE UP
GLUCOSE BLDC GLUCOMTR-MCNC: 368 MG/DL — HIGH (ref 70–99)
GLUCOSE BLDC GLUCOMTR-MCNC: 396 MG/DL — HIGH (ref 70–99)
GLUCOSE SERPL-MCNC: 562 MG/DL — CRITICAL HIGH (ref 70–99)
HCT VFR BLD CALC: 33.2 % — LOW (ref 39–50)
HGB BLD-MCNC: 11.5 G/DL — LOW (ref 13–17)
LYMPHOCYTES # BLD AUTO: 0.47 K/UL — LOW (ref 1–3.3)
LYMPHOCYTES # BLD AUTO: 4.5 % — LOW (ref 13–44)
MANUAL SMEAR VERIFICATION: SIGNIFICANT CHANGE UP
MCHC RBC-ENTMCNC: 29 PG — SIGNIFICANT CHANGE UP (ref 27–34)
MCHC RBC-ENTMCNC: 34.6 G/DL — SIGNIFICANT CHANGE UP (ref 32–36)
MCV RBC AUTO: 83.8 FL — SIGNIFICANT CHANGE UP (ref 80–100)
MONOCYTES # BLD AUTO: 0.28 K/UL — SIGNIFICANT CHANGE UP (ref 0–0.9)
MONOCYTES NFR BLD AUTO: 2.7 % — SIGNIFICANT CHANGE UP (ref 2–14)
MYELOCYTES NFR BLD: 0.9 % — HIGH (ref 0–0)
NEUTROPHILS # BLD AUTO: 9.5 K/UL — HIGH (ref 1.8–7.4)
NEUTROPHILS NFR BLD AUTO: 87.4 % — HIGH (ref 43–77)
NEUTS BAND # BLD: 4.5 % — SIGNIFICANT CHANGE UP (ref 0–8)
NEUTS BAND NFR BLD: 4.5 % — SIGNIFICANT CHANGE UP (ref 0–8)
NT-PROBNP SERPL-SCNC: 643 PG/ML — HIGH (ref 0–300)
PLAT MORPH BLD: NORMAL — SIGNIFICANT CHANGE UP
PLATELET # BLD AUTO: 85 K/UL — LOW (ref 150–400)
POTASSIUM SERPL-MCNC: 3.8 MMOL/L — SIGNIFICANT CHANGE UP (ref 3.5–5.3)
POTASSIUM SERPL-SCNC: 3.8 MMOL/L — SIGNIFICANT CHANGE UP (ref 3.5–5.3)
PROT SERPL-MCNC: 6.6 G/DL — SIGNIFICANT CHANGE UP (ref 6–8.3)
RBC # BLD: 3.96 M/UL — LOW (ref 4.2–5.8)
RBC # FLD: 12.1 % — SIGNIFICANT CHANGE UP (ref 10.3–14.5)
RBC BLD AUTO: SIGNIFICANT CHANGE UP
RSV RNA NPH QL NAA+NON-PROBE: SIGNIFICANT CHANGE UP
SARS-COV-2 RNA SPEC QL NAA+PROBE: SIGNIFICANT CHANGE UP
SODIUM SERPL-SCNC: 127 MMOL/L — LOW (ref 135–145)
TOXIC GRANULES BLD QL SMEAR: PRESENT — SIGNIFICANT CHANGE UP
TROPONIN T, HIGH SENSITIVITY RESULT: 13 NG/L — SIGNIFICANT CHANGE UP (ref 0–51)
TROPONIN T, HIGH SENSITIVITY RESULT: 13 NG/L — SIGNIFICANT CHANGE UP (ref 0–51)
WBC # BLD: 10.34 K/UL — SIGNIFICANT CHANGE UP (ref 3.8–10.5)
WBC # FLD AUTO: 10.34 K/UL — SIGNIFICANT CHANGE UP (ref 3.8–10.5)

## 2025-01-25 PROCEDURE — 71045 X-RAY EXAM CHEST 1 VIEW: CPT | Mod: 26

## 2025-01-25 PROCEDURE — 99285 EMERGENCY DEPT VISIT HI MDM: CPT

## 2025-01-25 RX ORDER — ACETAMINOPHEN 160 MG/5ML
1000 SUSPENSION ORAL ONCE
Refills: 0 | Status: COMPLETED | OUTPATIENT
Start: 2025-01-25 | End: 2025-01-25

## 2025-01-25 RX ORDER — INSULIN GLARGINE-YFGN 100 [IU]/ML
12 INJECTION, SOLUTION SUBCUTANEOUS AT BEDTIME
Refills: 0 | Status: DISCONTINUED | OUTPATIENT
Start: 2025-01-25 | End: 2025-01-26

## 2025-01-25 RX ORDER — INSULIN LISPRO 100/ML
5 VIAL (ML) SUBCUTANEOUS ONCE
Refills: 0 | Status: COMPLETED | OUTPATIENT
Start: 2025-01-25 | End: 2025-01-25

## 2025-01-25 RX ORDER — OSELTAMIVIR PHOSPHATE 75 MG/1
30 CAPSULE ORAL
Refills: 0 | Status: DISCONTINUED | OUTPATIENT
Start: 2025-01-25 | End: 2025-01-29

## 2025-01-25 RX ORDER — VANCOMYCIN HYDROCHLORIDE 50 MG/ML
1000 KIT ORAL ONCE
Refills: 0 | Status: COMPLETED | OUTPATIENT
Start: 2025-01-25 | End: 2025-01-25

## 2025-01-25 RX ORDER — DM/PSEUDOEPHED/ACETAMINOPHEN 10-30-250
25 CAPSULE ORAL ONCE
Refills: 0 | Status: DISCONTINUED | OUTPATIENT
Start: 2025-01-25 | End: 2025-01-29

## 2025-01-25 RX ORDER — POTASSIUM CHLORIDE 750 MG/1
20 TABLET, EXTENDED RELEASE ORAL ONCE
Refills: 0 | Status: COMPLETED | OUTPATIENT
Start: 2025-01-25 | End: 2025-01-25

## 2025-01-25 RX ORDER — TAMSULOSIN HYDROCHLORIDE 0.4 MG/1
0.4 CAPSULE ORAL AT BEDTIME
Refills: 0 | Status: DISCONTINUED | OUTPATIENT
Start: 2025-01-25 | End: 2025-01-29

## 2025-01-25 RX ORDER — INSULIN LISPRO 100/ML
VIAL (ML) SUBCUTANEOUS
Refills: 0 | Status: DISCONTINUED | OUTPATIENT
Start: 2025-01-25 | End: 2025-01-29

## 2025-01-25 RX ORDER — SODIUM CHLORIDE 9 G/ML
1000 INJECTION, SOLUTION INTRAVENOUS
Refills: 0 | Status: DISCONTINUED | OUTPATIENT
Start: 2025-01-25 | End: 2025-01-29

## 2025-01-25 RX ORDER — DM/PSEUDOEPHED/ACETAMINOPHEN 10-30-250
15 CAPSULE ORAL ONCE
Refills: 0 | Status: DISCONTINUED | OUTPATIENT
Start: 2025-01-25 | End: 2025-01-29

## 2025-01-25 RX ORDER — TAMSULOSIN HYDROCHLORIDE 0.4 MG/1
1 CAPSULE ORAL
Refills: 0 | DISCHARGE

## 2025-01-25 RX ORDER — PROPRANOLOL HCL 160 MG
1 CAPSULE, EXTENDED RELEASE 24HR ORAL
Refills: 0 | DISCHARGE

## 2025-01-25 RX ORDER — IPRATROPIUM BROMIDE AND ALBUTEROL SULFATE .5; 2.5 MG/3ML; MG/3ML
3 SOLUTION RESPIRATORY (INHALATION) ONCE
Refills: 0 | Status: COMPLETED | OUTPATIENT
Start: 2025-01-25 | End: 2025-01-25

## 2025-01-25 RX ORDER — GLUCAGON 3 MG/1
1 POWDER NASAL ONCE
Refills: 0 | Status: DISCONTINUED | OUTPATIENT
Start: 2025-01-25 | End: 2025-01-29

## 2025-01-25 RX ORDER — INSULIN GLARGINE-YFGN 100 [IU]/ML
15 INJECTION, SOLUTION SUBCUTANEOUS
Refills: 0 | DISCHARGE

## 2025-01-25 RX ORDER — DM/PSEUDOEPHED/ACETAMINOPHEN 10-30-250
12.5 CAPSULE ORAL ONCE
Refills: 0 | Status: DISCONTINUED | OUTPATIENT
Start: 2025-01-25 | End: 2025-01-29

## 2025-01-25 RX ORDER — BACTERIOSTATIC SODIUM CHLORIDE 0.9 %
1000 VIAL (ML) INJECTION ONCE
Refills: 0 | Status: COMPLETED | OUTPATIENT
Start: 2025-01-25 | End: 2025-01-25

## 2025-01-25 RX ORDER — IPRATROPIUM BROMIDE AND ALBUTEROL SULFATE .5; 2.5 MG/3ML; MG/3ML
3 SOLUTION RESPIRATORY (INHALATION) EVERY 6 HOURS
Refills: 0 | Status: DISCONTINUED | OUTPATIENT
Start: 2025-01-25 | End: 2025-01-29

## 2025-01-25 RX ORDER — LINAGLIPTIN 5 MG/1
1 TABLET, FILM COATED ORAL
Refills: 0 | DISCHARGE

## 2025-01-25 RX ADMIN — Medication 5: at 21:53

## 2025-01-25 RX ADMIN — POTASSIUM CHLORIDE 20 MILLIEQUIVALENT(S): 750 TABLET, EXTENDED RELEASE ORAL at 14:17

## 2025-01-25 RX ADMIN — Medication 1000 MILLILITER(S): at 15:00

## 2025-01-25 RX ADMIN — TAMSULOSIN HYDROCHLORIDE 0.4 MILLIGRAM(S): 0.4 CAPSULE ORAL at 21:52

## 2025-01-25 RX ADMIN — IPRATROPIUM BROMIDE AND ALBUTEROL SULFATE 3 MILLILITER(S): .5; 2.5 SOLUTION RESPIRATORY (INHALATION) at 23:13

## 2025-01-25 RX ADMIN — ACETAMINOPHEN 1000 MILLIGRAM(S): 160 SUSPENSION ORAL at 15:40

## 2025-01-25 RX ADMIN — OSELTAMIVIR PHOSPHATE 30 MILLIGRAM(S): 75 CAPSULE ORAL at 13:52

## 2025-01-25 RX ADMIN — VANCOMYCIN HYDROCHLORIDE 250 MILLIGRAM(S): KIT at 13:52

## 2025-01-25 RX ADMIN — INSULIN GLARGINE-YFGN 12 UNIT(S): 100 INJECTION, SOLUTION SUBCUTANEOUS at 21:53

## 2025-01-25 RX ADMIN — Medication 5 UNIT(S): at 14:23

## 2025-01-25 RX ADMIN — ACETAMINOPHEN 1000 MILLIGRAM(S): 160 SUSPENSION ORAL at 12:08

## 2025-01-25 RX ADMIN — ACETAMINOPHEN 400 MILLIGRAM(S): 160 SUSPENSION ORAL at 11:52

## 2025-01-25 RX ADMIN — Medication 1000 MILLILITER(S): at 13:47

## 2025-01-25 RX ADMIN — VANCOMYCIN HYDROCHLORIDE 1000 MILLIGRAM(S): KIT at 15:00

## 2025-01-25 RX ADMIN — IPRATROPIUM BROMIDE AND ALBUTEROL SULFATE 3 MILLILITER(S): .5; 2.5 SOLUTION RESPIRATORY (INHALATION) at 11:52

## 2025-01-25 NOTE — ED PROVIDER NOTE - ATTENDING CONTRIBUTION TO CARE
74 male coming in with shortness of breath there are multiple people in the family with the influenza in the household.  Awake alert talking no acute distress.  Crackles in the right lung fields.  Patient with post flu pneumonia based on my interpretation of the totality of his signs and symptoms.  Will do blood cultures IV antibiotics for MRSA as well as levaquin and admit to the hospital.  Patient requires O2.

## 2025-01-25 NOTE — PATIENT PROFILE ADULT - NSPROHMDIABETMGMTSTRAT_GEN_A_NUR
blood glucose testing/insulin therapy activity/adequate rest/blood glucose testing/diet modification/exercise/insulin therapy

## 2025-01-25 NOTE — ED ADULT NURSE NOTE - NSFALLRISKINTERV_ED_ALL_ED

## 2025-01-25 NOTE — H&P ADULT - ASSESSMENT
The patient is a 74y Male complaining of shortness of breath.    Acute hypoxic resp failure sec to PNA/Flu:    IV Zosyn  Tamiflu  F/up with Blood Cx  ID/Pul consults called  Duoneb    DM II:    FSSS  Cw Lantus    Dw family. The patient is a 74y Male complaining of shortness of breath.    Acute hypoxic resp failure sec to PNA/Flu:    IV Levofloxacin  Tamiflu  F/up with Blood Cx  ID/Pul consults called  Duoneb    DM II:    FSSS  Cw Lantus    Dw family. The patient is a 74y Male complaining of shortness of breath.    Acute hypoxic resp failure sec to PNA/Flu:    IV Levofloxacin  Tamiflu  F/up with Blood Cx  ID/Pul consults called  Duoneb    Uncontrolled DM II:    FSSS  Simeon austin called    Rufino family.

## 2025-01-25 NOTE — ED PROVIDER NOTE - OBJECTIVE STATEMENT
74-year-old male past medical HTN, DM, BPH, previous smoker presents to ED for viral URI symptoms, generalized weakness and SOB.  Patient placed on 2L NC by EMS for desatting to 88%.  Endorses associated sore throat, dry cough, and nasal congestion.  Grandchildren positive for influenza.  No recent travel.  Denies nausea, vomiting, chest pain, abdominal pain, urinary symptoms, change in bowel movement.  No prior history of PE/DVT. No history of COPD or asthma.

## 2025-01-25 NOTE — PATIENT PROFILE ADULT - CAREGIVER ADDRESS
Prescription for Pantoprazole 40 MG faxed to  pharmacy [phone]/ [phone] approved by Dr. Kline.   [address] [id number]

## 2025-01-25 NOTE — H&P ADULT - HISTORY OF PRESENT ILLNESS
74-year-old male past medical HTN, DM, BPH, previous smoker presents to ED for viral URI symptoms, generalized weakness and SOB.  Patient placed on 2L NC by EMS for desatting to 88%.  Endorses associated sore throat, dry cough, and nasal congestion.  Grandchildren positive for influenza.  No recent travel.  Denies nausea, vomiting, chest pain, abdominal pain, urinary symptoms, change in bowel movement.  No prior history of PE/DVT.

## 2025-01-25 NOTE — ED ADULT TRIAGE NOTE - CHIEF COMPLAINT QUOTE
sob x 3 days per daughter some diarrhea was given approx 700ml iv fluid at home by daughter was given tylenol at 9:30am placed on 2 liters oxygen in triage for satof88% on room air

## 2025-01-25 NOTE — ED ADULT NURSE REASSESSMENT NOTE - COMFORT CARE
pt and daughter made aware of inpt bed assignment and waiting for transport/plan of care explained/wait time explained
plan of care explained/wait time explained

## 2025-01-25 NOTE — ED PROVIDER NOTE - PHYSICAL EXAMINATION
Gen: NAD, non-toxic appearing  Head: normal appearing  HEENT: normal conjunctiva, oral mucosa dry   Lung: belly breathing, speaking in full sentences, CTA b/l , no le edema    CV: regular rate and rhythm, no murmurs  Abd: soft, non distended, non tender   MSK: no visible deformities  Neuro: No focal deficits, AAOx3  Skin: Warm  Psych: normal affect

## 2025-01-25 NOTE — ED PROVIDER NOTE - CLINICAL SUMMARY MEDICAL DECISION MAKING FREE TEXT BOX
Afebrile hemodynamically stable male presents to ED for evaluation close associated SOB and generalized weakness.  Patient placed on 2L NC by EMS for desatting 88%.  Patient saturating 91% on RA and placed on 4 L for symptomatic improvement.  Physical exam notable for clear breath sounds bilaterally, no lower extremity edema or calf tenderness.  Soft nontender nontender abdomen.  Ordered basic labs, troponin, proBNP, CXR, flu/COVID to rule out viral URI versus PNA versus ACS versus asthma exacerbation versus COPD exacerbation.  Given DuoNebs, reassess.

## 2025-01-25 NOTE — ED ADULT NURSE NOTE - OBJECTIVE STATEMENT
Pt presents with daughter (Caryn Tineo) who requests to translate. Daughter states 'worsening sob since Wednesday with weakness. He had a fall today due to feeling weak- did not hit his head, no LOC, no c/o any pain. Fever and day cough since Monday. His grandchild has been sick and is Flu+."

## 2025-01-26 DIAGNOSIS — J47.9 BRONCHIECTASIS, UNCOMPLICATED: ICD-10-CM

## 2025-01-26 DIAGNOSIS — J18.9 PNEUMONIA, UNSPECIFIED ORGANISM: ICD-10-CM

## 2025-01-26 DIAGNOSIS — J10.1 INFLUENZA DUE TO OTHER IDENTIFIED INFLUENZA VIRUS WITH OTHER RESPIRATORY MANIFESTATIONS: ICD-10-CM

## 2025-01-26 DIAGNOSIS — E11.9 TYPE 2 DIABETES MELLITUS WITHOUT COMPLICATIONS: ICD-10-CM

## 2025-01-26 DIAGNOSIS — R06.02 SHORTNESS OF BREATH: ICD-10-CM

## 2025-01-26 LAB
A1C WITH ESTIMATED AVERAGE GLUCOSE RESULT: 9.1 % — HIGH (ref 4–5.6)
ADD ON TEST-SPECIMEN IN LAB: SIGNIFICANT CHANGE UP
ANION GAP SERPL CALC-SCNC: 12 MMOL/L — SIGNIFICANT CHANGE UP (ref 5–17)
BUN SERPL-MCNC: 29 MG/DL — HIGH (ref 7–23)
CALCIUM SERPL-MCNC: 9.1 MG/DL — SIGNIFICANT CHANGE UP (ref 8.4–10.5)
CHLORIDE SERPL-SCNC: 99 MMOL/L — SIGNIFICANT CHANGE UP (ref 96–108)
CO2 SERPL-SCNC: 20 MMOL/L — LOW (ref 22–31)
CREAT SERPL-MCNC: 1.46 MG/DL — HIGH (ref 0.5–1.3)
EGFR: 50 ML/MIN/1.73M2 — LOW
ESTIMATED AVERAGE GLUCOSE: 214 MG/DL — HIGH (ref 68–114)
GLUCOSE BLDC GLUCOMTR-MCNC: 133 MG/DL — HIGH (ref 70–99)
GLUCOSE BLDC GLUCOMTR-MCNC: 153 MG/DL — HIGH (ref 70–99)
GLUCOSE BLDC GLUCOMTR-MCNC: 217 MG/DL — HIGH (ref 70–99)
GLUCOSE BLDC GLUCOMTR-MCNC: 230 MG/DL — HIGH (ref 70–99)
GLUCOSE SERPL-MCNC: 190 MG/DL — HIGH (ref 70–99)
HCT VFR BLD CALC: 31.4 % — LOW (ref 39–50)
HGB BLD-MCNC: 10.8 G/DL — LOW (ref 13–17)
MCHC RBC-ENTMCNC: 28.8 PG — SIGNIFICANT CHANGE UP (ref 27–34)
MCHC RBC-ENTMCNC: 34.4 G/DL — SIGNIFICANT CHANGE UP (ref 32–36)
MCV RBC AUTO: 83.7 FL — SIGNIFICANT CHANGE UP (ref 80–100)
MRSA PCR RESULT.: SIGNIFICANT CHANGE UP
NRBC # BLD: 0 /100 WBCS — SIGNIFICANT CHANGE UP (ref 0–0)
NRBC BLD-RTO: 0 /100 WBCS — SIGNIFICANT CHANGE UP (ref 0–0)
PLATELET # BLD AUTO: 93 K/UL — LOW (ref 150–400)
POTASSIUM SERPL-MCNC: 3.9 MMOL/L — SIGNIFICANT CHANGE UP (ref 3.5–5.3)
POTASSIUM SERPL-SCNC: 3.9 MMOL/L — SIGNIFICANT CHANGE UP (ref 3.5–5.3)
PROCALCITONIN SERPL-MCNC: 13.9 NG/ML — HIGH (ref 0.02–0.1)
RBC # BLD: 3.75 M/UL — LOW (ref 4.2–5.8)
RBC # FLD: 12.3 % — SIGNIFICANT CHANGE UP (ref 10.3–14.5)
S AUREUS DNA NOSE QL NAA+PROBE: DETECTED
SODIUM SERPL-SCNC: 131 MMOL/L — LOW (ref 135–145)
WBC # BLD: 8.68 K/UL — SIGNIFICANT CHANGE UP (ref 3.8–10.5)
WBC # FLD AUTO: 8.68 K/UL — SIGNIFICANT CHANGE UP (ref 3.8–10.5)

## 2025-01-26 PROCEDURE — 71250 CT THORAX DX C-: CPT | Mod: 26

## 2025-01-26 RX ORDER — INSULIN GLARGINE-YFGN 100 [IU]/ML
15 INJECTION, SOLUTION SUBCUTANEOUS AT BEDTIME
Refills: 0 | Status: DISCONTINUED | OUTPATIENT
Start: 2025-01-26 | End: 2025-01-29

## 2025-01-26 RX ORDER — INSULIN LISPRO 100/ML
6 VIAL (ML) SUBCUTANEOUS
Refills: 0 | Status: DISCONTINUED | OUTPATIENT
Start: 2025-01-26 | End: 2025-01-29

## 2025-01-26 RX ORDER — HEPARIN SODIUM,PORCINE 10000/ML
5000 VIAL (ML) INJECTION EVERY 8 HOURS
Refills: 0 | Status: DISCONTINUED | OUTPATIENT
Start: 2025-01-26 | End: 2025-01-29

## 2025-01-26 RX ADMIN — IPRATROPIUM BROMIDE AND ALBUTEROL SULFATE 3 MILLILITER(S): .5; 2.5 SOLUTION RESPIRATORY (INHALATION) at 17:29

## 2025-01-26 RX ADMIN — IPRATROPIUM BROMIDE AND ALBUTEROL SULFATE 3 MILLILITER(S): .5; 2.5 SOLUTION RESPIRATORY (INHALATION) at 23:38

## 2025-01-26 RX ADMIN — Medication 2: at 08:05

## 2025-01-26 RX ADMIN — INSULIN GLARGINE-YFGN 15 UNIT(S): 100 INJECTION, SOLUTION SUBCUTANEOUS at 22:29

## 2025-01-26 RX ADMIN — IPRATROPIUM BROMIDE AND ALBUTEROL SULFATE 3 MILLILITER(S): .5; 2.5 SOLUTION RESPIRATORY (INHALATION) at 13:04

## 2025-01-26 RX ADMIN — Medication 6 UNIT(S): at 12:24

## 2025-01-26 RX ADMIN — OSELTAMIVIR PHOSPHATE 30 MILLIGRAM(S): 75 CAPSULE ORAL at 17:29

## 2025-01-26 RX ADMIN — Medication 6 UNIT(S): at 16:23

## 2025-01-26 RX ADMIN — TAMSULOSIN HYDROCHLORIDE 0.4 MILLIGRAM(S): 0.4 CAPSULE ORAL at 22:30

## 2025-01-26 RX ADMIN — Medication 1: at 16:23

## 2025-01-26 RX ADMIN — OSELTAMIVIR PHOSPHATE 30 MILLIGRAM(S): 75 CAPSULE ORAL at 05:16

## 2025-01-26 RX ADMIN — Medication 2: at 12:25

## 2025-01-26 RX ADMIN — IPRATROPIUM BROMIDE AND ALBUTEROL SULFATE 3 MILLILITER(S): .5; 2.5 SOLUTION RESPIRATORY (INHALATION) at 05:17

## 2025-01-26 RX ADMIN — Medication 5000 UNIT(S): at 22:30

## 2025-01-26 NOTE — CONSULT NOTE ADULT - ASSESSMENT
75 y/o M with PMH of HTN, DM, BPH, former smoker, hx of TB (treated), bronchiectasis. Presents to the ED with URI symptoms, weakness, SOB. Hypoxic to 88% on room air, placed on 2LNC. Found to be + for Influenza A,. CXR with R midlung opacities. Pulmonary called to consult for flu infection, PNA.

## 2025-01-26 NOTE — CONSULT NOTE ADULT - SUBJECTIVE AND OBJECTIVE BOX
HPI:  74-year-old male past medical HTN, DM, BPH, previous smoker presents to ED for viral URI symptoms, generalized weakness and SOB.  Patient placed on 2L NC by EMS for desatting to 88%.  Endorses associated sore throat, dry cough, and nasal congestion.  Grandchildren positive for influenza.  No recent travel.  Denies nausea, vomiting, chest pain, abdominal pain, urinary symptoms, change in bowel movement.  No prior history of PE/DVT.    (25 Jan 2025 17:10)  Patient has history of diabetes, was on oral diabetes medications and was on insulin at home,  no recent hypoglycemic episodes. Patient follows up with  PCP for health diabetes management.    PAST MEDICAL & SURGICAL HISTORY:  HTN (hypertension)      BPH (benign prostatic hyperplasia)      Kidney stones      History of TB (tuberculosis)      Bacterial UTI      Bacterial septicemia      S/P cystoscopy      S/P cystoscopy with ureteral stent placement      H/O renal calculi          FAMILY HISTORY:      Social History:    Outpatient Medications:    MEDICATIONS  (STANDING):  albuterol/ipratropium for Nebulization 3 milliLiter(s) Nebulizer every 6 hours  dextrose 5%. 1000 milliLiter(s) (100 mL/Hr) IV Continuous <Continuous>  dextrose 5%. 1000 milliLiter(s) (50 mL/Hr) IV Continuous <Continuous>  dextrose 50% Injectable 25 Gram(s) IV Push once  dextrose 50% Injectable 12.5 Gram(s) IV Push once  dextrose 50% Injectable 25 Gram(s) IV Push once  glucagon  Injectable 1 milliGRAM(s) IntraMuscular once  insulin glargine Injectable (LANTUS) 15 Unit(s) SubCutaneous at bedtime  insulin lispro (ADMELOG) corrective regimen sliding scale   SubCutaneous three times a day before meals  insulin lispro Injectable (ADMELOG) 6 Unit(s) SubCutaneous three times a day before meals  levoFLOXacin IVPB 750 milliGRAM(s) IV Intermittent every 48 hours  oseltamivir 30 milliGRAM(s) Oral two times a day  propranolol 20 milliGRAM(s) Oral every 8 hours  tamsulosin 0.4 milliGRAM(s) Oral at bedtime    MEDICATIONS  (PRN):  dextrose Oral Gel 15 Gram(s) Oral once PRN Blood Glucose LESS THAN 70 milliGRAM(s)/deciliter      Allergies    cephalosporins (Other)    Intolerances        ALL  SYSTEMS REVIEWED.    PHYSICAL EXAM:  VITALS: T(C): 37.2 (01-26-25 @ 11:34)  T(F): 98.9 (01-26-25 @ 11:34), Max: 98.9 (01-26-25 @ 11:34)  HR: 70 (01-26-25 @ 11:34) (60 - 87)  BP: 121/74 (01-26-25 @ 11:34) (93/63 - 121/74)  RR:  (20 - 20)  SpO2:  (92% - 97%)  Wt(kg): --  THYROID: Normal size, no palpable nodules  RESPIRATORY: Clear to auscultation bilaterally.  CARDIOVASCULAR: Si S2, No murmurs;  GI: Soft, non distended.      POCT Blood Glucose.: 230 mg/dL (01-26-25 @ 11:45)  POCT Blood Glucose.: 217 mg/dL (01-26-25 @ 07:56)  POCT Blood Glucose.: 368 mg/dL (01-25-25 @ 21:47)  POCT Blood Glucose.: 396 mg/dL (01-25-25 @ 17:11)  POCT Blood Glucose.: 507 mg/dL (01-25-25 @ 14:20)  POCT Blood Glucose.: 490 mg/dL (01-25-25 @ 14:19)                            10.8   8.68  )-----------( 93 ( 26 Jan 2025 07:31 )             31.4       01-26    131[L]  |  99  |  29[H]  ----------------------------<  190[H]  3.9   |  20[L]  |  1.46[H]    eGFR: 50[L]    Ca    9.1      01-26    TPro  6.6  /  Alb  3.0[L]  /  TBili  1.8[H]  /  DBili  x   /  AST  18  /  ALT  11  /  AlkPhos  77  01-25      Thyroid Function Tests:          Radiology:

## 2025-01-26 NOTE — CONSULT NOTE ADULT - ASSESSMENT
74m with  HTN, DM, BPH, ckd, previous smoker presents to ED for viral URI symptoms, generalized weakness and SOB.    influenza a + superimposed bacterial pna  thrombocytopenia     cxr right lobe infiltrate    plan  check mrsa  urine legionella  follow blood cx  check procal  tamiflu X 5 days  levofloxacin day 2 of 5    supportive oxygen/nebs/inhalers  will need surveillance imaging in 6-10 weeks to document resolution       74m with  HTN, DM, BPH, ckd, previous smoker presents to ED for viral URI symptoms, generalized weakness and SOB.    influenza a + superimposed bacterial pna  thrombocytopenia     cxr right lobe infiltrate    plan  check mrsa  urine legionella  follow blood cx  check procal  tamiflu X 5 days  levofloxacin day 2 of 5    supportive oxygen/nebs/inhalers  will need surveillance imaging in 6-10 weeks to document resolution    pt evaluated face to face time in addition to reviewing history,  labs, microbiology and imaging.  antibiotic stewardship, local antibiogram, infection control strategies and potential transmission issues taken in to consideration at time of  treatment decision making process.

## 2025-01-26 NOTE — CONSULT NOTE ADULT - PROBLEM SELECTOR RECOMMENDATION 9
Will start Lantus 15 units at bed time.  Will start Admelog 6 units before each meal in addition to Admelog correction scale coverage.  Patient counseled for compliance with consistent low carb diet.  .
2nd to Influenza + PNA  -Flu A +, CXR with R sided infiltrate  -Check CT chest  -ABX as per ID  -Duoneb q6h  -Keep sats >90% with O2 PRN. Wean O2 as tolerated.

## 2025-01-26 NOTE — CONSULT NOTE ADULT - PROBLEM SELECTOR RECOMMENDATION 2
Suggest to continue medications, monitoring, FU primary team recommendations. .
-RVP + Flu A  -Continue Tamiflu  -Supportive care   -F/u CT chest.

## 2025-01-26 NOTE — CONSULT NOTE ADULT - SUBJECTIVE AND OBJECTIVE BOX
full consult to follow  [facility]   GABI Kramer S. Shah, Y. Patel, G. Casimir  [phone]   weekends and after hours [phone]    SPIKE TERRY  74y, Male  [id number]    HPI--  HPI:  74m with  HTN, DM, BPH, previous smoker presents to ED for viral URI symptoms, generalized weakness and SOB.    pt with dyspnea, cough, sore throat.  + sick contacts      PMH/PSH--  HTN (hypertension)  BPH (benign prostatic hyperplasia)  Kidney stones  History of TB (tuberculosis)  Bacterial UTI  Bacterial septicemia  S/P cystoscopy  S/P cystoscopy with ureteral stent placement  H/O renal calculi        Allergies--cephalosporins      Medications--  Antibiotics: levoFLOXacin IVPB 750 milliGRAM(s) IV Intermittent every 48 hours  oseltamivir 30 milliGRAM(s) Oral two times a day    Immunologic:   Other: albuterol/ipratropium for Nebulization  dextrose 5%.  dextrose 5%.  dextrose 50% Injectable  dextrose 50% Injectable  dextrose 50% Injectable  dextrose Oral Gel PRN  glucagon  Injectable  insulin glargine Injectable (LANTUS)  insulin lispro (ADMELOG) corrective regimen sliding scale  insulin lispro Injectable (ADMELOG)  propranolol  tamsulosin      Social History--  EtOH: denies ***  Tobacco: former   Drug Use: denies ***    Family/Marital History--       Travel/Environmental/Occupational History:  nc    Review of Systems:  REVIEW OF SYSTEMS  General: no fever, no chills, no wt loss	  Ophthalmologic: no blurry vision  Respiratory and Thorax: + cough, + dyspnea  Cardiovascular: no chest pain, no palpitations  Gastrointestinal:  no nausea, no vomiting, diarrhea  Genitourinary: no dysuria, no urgency, no frequency	  Musculoskeletal: no myalgias	  Neurological:  no headache	    Physical Exam--  Vital Signs: T(F): 98.1 (01-26-25 @ 05:06), Max: 98.5 (01-25-25 @ 11:45)  HR: 87 (01-26-25 @ 05:06)  BP: 118/69 (01-26-25 @ 05:06)  RR: 20 (01-26-25 @ 05:06)  SpO2: 96% (01-26-25 @ 05:06)  Wt(kg): --  General: Nontoxic-appearing Male in no acute distress.  HEENT: AT/NC. PERRL. r.  Neck: Not rigid. No sense of mass.  Nodes: None palpable.  Lungs: decreased bs no wheeze   Heart: Regular rate and rhythm. No Murmur. No rub. No gallop. No palpable thrill.  Abdomen: Bowel sounds present and normoactive. Soft. Nondistended. Nontender  Back: No spinal tenderness. No costovertebral angle tenderness.   Extremities: No cyanosis or clubbing. No edema.   Skin: Warm. Dry. Good turgor. No rash. No vasculitic stigmata.  Psychiatric: Appropriate affect and mood for situation.         Laboratory & Imaging Data--  CBC                        10.8   8.68  )-----------( 93       ( 26 Jan 2025 07:31 )             31.4       Chemistries  01-26    131[L]  |  99  |  29[H]  ----------------------------<  190[H]  3.9   |  20[L]  |  1.46[H]    Ca    9.1      26 Jan 2025 07:31    TPro  6.6  /  Alb  3.0[L]  /  TBili  1.8[H]  /  DBili  x   /  AST  18  /  ALT  11  /  AlkPhos  77  01-25      Culture Data  < from: Xray Chest 1 View- PORTABLE-Urgent (01.25.25 @ 13:42) >    ACC: [id number] EXAM:  XR CHEST PORTABLE URGENT 1V   ORDERED BY:  PHILLIP NAVARRETE     PROCEDURE DATE:  01/25/2025          INTERPRETATION:  CLINICAL INDICATION: Shortness of Breath    TECHNIQUE: Frontal view of the chest was obtained.    COMPARISON: Chest x-ray 9/12/2024.    FINDINGS:  The heart size is normal.  Right midlung patchy opacities.  No pleural effusion.  No pneumothorax.  No acute osseous abnormalities.    IMPRESSION:  Right midlung patchy opacities.    < end of copied text >

## 2025-01-26 NOTE — CONSULT NOTE ADULT - REASON FOR ADMISSION
The patient is a 74y Male complaining of shortness of breath.

## 2025-01-26 NOTE — CONSULT NOTE ADULT - ASSESSMENT
Assessment  DMT2: 74y Male with DM T2 with hyperglycemia admitted with SOB, patient was on oral hypoglycemic agents and insulin at home, now insulin coverage, blood sugars are running high, eating meals, compliant with low carb diet.  Patient is high risk with high level decision making due to uncontrolled diabetes, has increased risk for complications.   SOB: on medications, monitored, no acute events..  HTN: On antihypertensive medications, monitored, stable..      Darryl Reid MD  Cell:  [phone]  Office: [phone]

## 2025-01-26 NOTE — CONSULT NOTE ADULT - SUBJECTIVE AND OBJECTIVE BOX
Pulmonary Consult   01-26-25 @ 08:33    Patient is a 74y old  Male who presents with a chief complaint of The patient is a 74y Male complaining of shortness of breath. (25 Jan 2025 17:10)      HPI: 73 y/o M with PMH of HTN, DM, BPH, former smoker. Presents to the ED with URI symptoms, weakness, SOB. Hypoxic to 88% on room air, placed on 2LNC. Found to be + for Influenza A,. CXR with R midlung opacities. Pulmonary called to consult for flu infection, PNA.       ?FOLLOWING PRESENT  [ ] Hx of PE/DVT, [ ] Hx COPD, [ ] Hx of Asthma, [ ] Hx of Hospitalization, [ ]  Hx of BiPAP/CPAP use, [ ] Hx of AGNES    Allergies    cephalosporins (Other)    Intolerances        PAST MEDICAL & SURGICAL HISTORY:  HTN (hypertension)      BPH (benign prostatic hyperplasia)      Kidney stones      History of TB (tuberculosis)      Bacterial UTI      Bacterial septicemia      S/P cystoscopy      S/P cystoscopy with ureteral stent placement      H/O renal calculi          FAMILY HISTORY:      Social History: [  ] TOBACCO                  [  ] ETOH                                 [  ] IVDA/DRUGS    REVIEW OF SYSTEMS      General:	    Skin/Breast:  	  Ophthalmologic:  	  ENMT:	    Respiratory and Thorax:  	  Cardiovascular:	    Gastrointestinal:	    Genitourinary:	    Musculoskeletal:	    Neurological:	    Psychiatric:	    Hematology/Lymphatics:	    Endocrine:	    Allergic/Immunologic:	    MEDICATIONS  (STANDING):  albuterol/ipratropium for Nebulization 3 milliLiter(s) Nebulizer every 6 hours  dextrose 5%. 1000 milliLiter(s) (100 mL/Hr) IV Continuous <Continuous>  dextrose 5%. 1000 milliLiter(s) (50 mL/Hr) IV Continuous <Continuous>  dextrose 50% Injectable 25 Gram(s) IV Push once  dextrose 50% Injectable 12.5 Gram(s) IV Push once  dextrose 50% Injectable 25 Gram(s) IV Push once  glucagon  Injectable 1 milliGRAM(s) IntraMuscular once  insulin glargine Injectable (LANTUS) 12 Unit(s) SubCutaneous at bedtime  insulin lispro (ADMELOG) corrective regimen sliding scale   SubCutaneous three times a day before meals  levoFLOXacin IVPB 750 milliGRAM(s) IV Intermittent every 48 hours  oseltamivir 30 milliGRAM(s) Oral two times a day  propranolol 20 milliGRAM(s) Oral every 8 hours  tamsulosin 0.4 milliGRAM(s) Oral at bedtime    MEDICATIONS  (PRN):  dextrose Oral Gel 15 Gram(s) Oral once PRN Blood Glucose LESS THAN 70 milliGRAM(s)/deciliter       Vital Signs Last 24 Hrs  T(C): 36.7 (26 Jan 2025 05:06), Max: 37.7 (25 Jan 2025 10:46)  T(F): 98.1 (26 Jan 2025 05:06), Max: 99.8 (25 Jan 2025 10:46)  HR: 87 (26 Jan 2025 05:06) (60 - 88)  BP: 118/69 (26 Jan 2025 05:06) (93/63 - 118/69)  BP(mean): --  RR: 20 (26 Jan 2025 05:06) (20 - 20)  SpO2: 96% (26 Jan 2025 05:06) (92% - 97%)    Parameters below as of 26 Jan 2025 05:06  Patient On (Oxygen Delivery Method): nasal cannula  O2 Flow (L/min): 3  Orthostatic VS          I&O's Summary    25 Jan 2025 07:01  -  26 Jan 2025 07:00  --------------------------------------------------------  IN: 0 mL / OUT: 300 mL / NET: -300 mL        Physical Exam:   GENERAL: NAD, well-groomed, well-developed  HEENT: JIMMIE/   Atraumatic, Normocephalic  ENMT: No tonsillar erythema, exudates, or enlargement; Moist mucous membranes, Good dentition, No lesions  NECK: Supple, No JVD, Normal thyroid  CHEST/LUNG: Clear to auscultation bilaterally; No rales, rhonchi, wheezing, or rubs  CVS: Regular rate and rhythm; No murmurs, rubs, or gallops  GI: : Soft, Nontender, Nondistended; Bowel sounds present  NERVOUS SYSTEM:  Alert & Oriented X3, Good concentration; Motor Strength 5/5 B/L upper and lower extremities; DTRs 2+ intact and symmetric  EXTREMITIES:  2+ Peripheral Pulses, No clubbing, cyanosis, or edema  LYMPH: No lymphadenopathy noted  SKIN: No rashes or lesions  ENDOCRINOLOGY: No Thyromegaly  PSYCH: Appropriate    Labs:  Venous<34<4>>59<<7.435>>Venous<<3><<4><<5<<599>>                            10.8   8.68  )-----------( x        ( 26 Jan 2025 07:31 )             31.4                         11.5   10.34 )-----------( 85       ( 25 Jan 2025 12:15 )             33.2     01-25    127[L]  |  92[L]  |  34[H]  ----------------------------<  562[HH]  3.8   |  20[L]  |  1.78[H]    Ca    8.9      25 Jan 2025 12:15    TPro  6.6  /  Alb  3.0[L]  /  TBili  1.8[H]  /  DBili  x   /  AST  18  /  ALT  11  /  AlkPhos  77  01-25    CAPILLARY BLOOD GLUCOSE      POCT Blood Glucose.: 217 mg/dL (26 Jan 2025 07:56)  POCT Blood Glucose.: 368 mg/dL (25 Jan 2025 21:47)  POCT Blood Glucose.: 396 mg/dL (25 Jan 2025 17:11)  POCT Blood Glucose.: 507 mg/dL (25 Jan 2025 14:20)  POCT Blood Glucose.: 490 mg/dL (25 Jan 2025 14:19)    LIVER FUNCTIONS - ( 25 Jan 2025 12:15 )  Alb: 3.0 g/dL / Pro: 6.6 g/dL / ALK PHOS: 77 U/L / ALT: 11 U/L / AST: 18 U/L / GGT: x             Urinalysis Basic - ( 25 Jan 2025 12:15 )    Color: x / Appearance: x / SG: x / pH: x  Gluc: 562 mg/dL / Ketone: x  / Bili: x / Urobili: x   Blood: x / Protein: x / Nitrite: x   Leuk Esterase: x / RBC: x / WBC x   Sq Epi: x / Non Sq Epi: x / Bacteria: x      D DImer      Studies  Chest X-RAY  < from: Xray Chest 1 View- PORTABLE-Urgent (01.25.25 @ 13:42) >  ACC: [id number] EXAM:  XR CHEST PORTABLE URGENT 1V   ORDERED BY:  PHILLIP NAVARRETE     PROCEDURE DATE:  01/25/2025          INTERPRETATION:  CLINICAL INDICATION: Shortness of Breath    TECHNIQUE: Frontal view of the chest was obtained.    COMPARISON: Chest x-ray 9/12/2024.    FINDINGS:  The heart size is normal.  Right midlung patchy opacities.  No pleural effusion.  No pneumothorax.  No acute osseous abnormalities.    IMPRESSION:  Right midlung patchy opacities.    --- End of Report ---    < end of copied text >                     Pulmonary Consult   01-26-25 @ 08:33    Patient is a 74y old  Male who presents with a chief complaint of The patient is a 74y Male complaining of shortness of breath. (25 Jan 2025 17:10)      HPI: 75 y/o M with PMH of HTN, DM, BPH, former smoker. Presents to the ED with URI symptoms, weakness, SOB. Hypoxic to 88% on room air, placed on 2LNC. Found to be + for Influenza A,. CXR with R midlung opacities. Pulmonary called to consult for flu infection, PNA. Former smoker, denies hx of prior asthma/COPD. Reports hx of prior TB (in 1950s?) that has been treated. Dyspnea improving since admission. +dry cough at times. Denies chest pain, pleuritic chest pain.     ?FOLLOWING PRESENT  [ x] Hx of PE/DVT, [x ] Hx COPD, [x ] Hx of Asthma, [ x] Hx of Hospitalization, [x ]  Hx of BiPAP/CPAP use, [x ] Hx of AGNES    Allergies    cephalosporins (Other)    Intolerances        PAST MEDICAL & SURGICAL HISTORY:  HTN (hypertension)      BPH (benign prostatic hyperplasia)      Kidney stones      History of TB (tuberculosis)      Bacterial UTI      Bacterial septicemia      S/P cystoscopy      S/P cystoscopy with ureteral stent placement      H/O renal calculi          FAMILY HISTORY: non contributory       Social History: [ former  ] TOBACCO                  [x  ] ETOH                                 [ x ] IVDA/DRUGS    REVIEW OF SYSTEMS      General:	as above    Skin/Breast:x  	  Ophthalmologic:x  	  ENMT:	x    Respiratory and Thorax: as above    Cardiovascular:	x    Gastrointestinal:	x    Genitourinary:	x    Musculoskeletal:	x    Neurological:	x    Psychiatric:	x    Hematology/Lymphatics:	 x    Endocrine:	x    Allergic/Immunologic:	x    MEDICATIONS  (STANDING):  albuterol/ipratropium for Nebulization 3 milliLiter(s) Nebulizer every 6 hours  dextrose 5%. 1000 milliLiter(s) (100 mL/Hr) IV Continuous <Continuous>  dextrose 5%. 1000 milliLiter(s) (50 mL/Hr) IV Continuous <Continuous>  dextrose 50% Injectable 25 Gram(s) IV Push once  dextrose 50% Injectable 12.5 Gram(s) IV Push once  dextrose 50% Injectable 25 Gram(s) IV Push once  glucagon  Injectable 1 milliGRAM(s) IntraMuscular once  insulin glargine Injectable (LANTUS) 12 Unit(s) SubCutaneous at bedtime  insulin lispro (ADMELOG) corrective regimen sliding scale   SubCutaneous three times a day before meals  levoFLOXacin IVPB 750 milliGRAM(s) IV Intermittent every 48 hours  oseltamivir 30 milliGRAM(s) Oral two times a day  propranolol 20 milliGRAM(s) Oral every 8 hours  tamsulosin 0.4 milliGRAM(s) Oral at bedtime    MEDICATIONS  (PRN):  dextrose Oral Gel 15 Gram(s) Oral once PRN Blood Glucose LESS THAN 70 milliGRAM(s)/deciliter       Vital Signs Last 24 Hrs  T(C): 36.7 (26 Jan 2025 05:06), Max: 37.7 (25 Jan 2025 10:46)  T(F): 98.1 (26 Jan 2025 05:06), Max: 99.8 (25 Jan 2025 10:46)  HR: 87 (26 Jan 2025 05:06) (60 - 88)  BP: 118/69 (26 Jan 2025 05:06) (93/63 - 118/69)  BP(mean): --  RR: 20 (26 Jan 2025 05:06) (20 - 20)  SpO2: 96% (26 Jan 2025 05:06) (92% - 97%)    Parameters below as of 26 Jan 2025 05:06  Patient On (Oxygen Delivery Method): nasal cannula  O2 Flow (L/min): 3  Orthostatic VS          I&O's Summary    25 Jan 2025 07:01  -  26 Jan 2025 07:00  --------------------------------------------------------  IN: 0 mL / OUT: 300 mL / NET: -300 mL        Physical Exam:   GENERAL: NAD, well-groomed, well-developed  HEENT: JIMMIE/   Atraumatic, Normocephalic  ENMT: No tonsillar erythema, exudates, or enlargement; Moist mucous membranes, Good dentition, No lesions  NECK: Supple, No JVD, Normal thyroid  CHEST/LUNG: Few crackles R   CVS: Regular rate and rhythm; No murmurs, rubs, or gallops  GI: : Soft, Nontender, Nondistended; Bowel sounds present  NERVOUS SYSTEM:  Alert & Oriented X3, Good concentration; Motor Strength 5/5 B/L upper and lower extremities; DTRs 2+ intact and symmetric  EXTREMITIES:  2+ Peripheral Pulses, No clubbing, cyanosis, or edema  LYMPH: No lymphadenopathy noted  SKIN: No rashes or lesions  ENDOCRINOLOGY: No Thyromegaly  PSYCH: Appropriate    Labs:  Venous<34<4>>59<<7.435>>Venous<<3><<4><<5<<599>>                            10.8   8.68  )-----------( x        ( 26 Jan 2025 07:31 )             31.4                         11.5   10.34 )-----------( 85       ( 25 Jan 2025 12:15 )             33.2     01-25    127[L]  |  92[L]  |  34[H]  ----------------------------<  562[HH]  3.8   |  20[L]  |  1.78[H]    Ca    8.9      25 Jan 2025 12:15    TPro  6.6  /  Alb  3.0[L]  /  TBili  1.8[H]  /  DBili  x   /  AST  18  /  ALT  11  /  AlkPhos  77  01-25    CAPILLARY BLOOD GLUCOSE      POCT Blood Glucose.: 217 mg/dL (26 Jan 2025 07:56)  POCT Blood Glucose.: 368 mg/dL (25 Jan 2025 21:47)  POCT Blood Glucose.: 396 mg/dL (25 Jan 2025 17:11)  POCT Blood Glucose.: 507 mg/dL (25 Jan 2025 14:20)  POCT Blood Glucose.: 490 mg/dL (25 Jan 2025 14:19)    LIVER FUNCTIONS - ( 25 Jan 2025 12:15 )  Alb: 3.0 g/dL / Pro: 6.6 g/dL / ALK PHOS: 77 U/L / ALT: 11 U/L / AST: 18 U/L / GGT: x             Urinalysis Basic - ( 25 Jan 2025 12:15 )    Color: x / Appearance: x / SG: x / pH: x  Gluc: 562 mg/dL / Ketone: x  / Bili: x / Urobili: x   Blood: x / Protein: x / Nitrite: x   Leuk Esterase: x / RBC: x / WBC x   Sq Epi: x / Non Sq Epi: x / Bacteria: x      D DImer      Studies  Chest X-RAY  < from: Xray Chest 1 View- PORTABLE-Urgent (01.25.25 @ 13:42) >  ACC: [id number] EXAM:  XR CHEST PORTABLE URGENT 1V   ORDERED BY:  PHILLIP NAVARRETE     PROCEDURE DATE:  01/25/2025          INTERPRETATION:  CLINICAL INDICATION: Shortness of Breath    TECHNIQUE: Frontal view of the chest was obtained.    COMPARISON: Chest x-ray 9/12/2024.    FINDINGS:  The heart size is normal.  Right midlung patchy opacities.  No pleural effusion.  No pneumothorax.  No acute osseous abnormalities.    IMPRESSION:  Right midlung patchy opacities.    --- End of Report ---    < end of copied text >                     Pulmonary Consult   01-26-25 @ 08:33    Patient is a 74y old  Male who presents with a chief complaint of The patient is a 74y Male complaining of shortness of breath. (25 Jan 2025 17:10)      HPI: 75 y/o M with PMH of HTN, DM, BPH, former smoker. Presents to the ED with URI symptoms, weakness, SOB. Hypoxic to 88% on room air, placed on 2LNC. Found to be + for Influenza A,. CXR with R midlung opacities. Pulmonary called to consult for flu infection, PNA. Former smoker, denies hx of prior asthma/COPD. Reports hx of prior TB (in 1950s?) that has been treated. Dyspnea improving since admission. +dry cough at times. Denies chest pain, pleuritic chest pain.     ?FOLLOWING PRESENT  [ x] Hx of PE/DVT, [x ] Hx COPD, [x ] Hx of Asthma, [ x] Hx of Hospitalization, [x ]  Hx of BiPAP/CPAP use, [x ] Hx of AGNES    Allergies    cephalosporins (Other)    Intolerances        PAST MEDICAL & SURGICAL HISTORY:  HTN (hypertension)      BPH (benign prostatic hyperplasia)      Kidney stones      History of TB (tuberculosis)      Bacterial UTI      Bacterial septicemia      S/P cystoscopy      S/P cystoscopy with ureteral stent placement      H/O renal calculi          FAMILY HISTORY: non contributory       Social History: [ former  ] TOBACCO                  [x  ] ETOH                                 [ x ] IVDA/DRUGS    REVIEW OF SYSTEMS      General:	as above    Skin/Breast:x  	  Ophthalmologic:x  	  ENMT:	x    Respiratory and Thorax: as above    Cardiovascular:	x    Gastrointestinal:	x    Genitourinary:	x    Musculoskeletal:	x    Neurological:	x    Psychiatric:	x    Hematology/Lymphatics:	 x    Endocrine:	x    Allergic/Immunologic:	x    MEDICATIONS  (STANDING):  albuterol/ipratropium for Nebulization 3 milliLiter(s) Nebulizer every 6 hours  dextrose 5%. 1000 milliLiter(s) (100 mL/Hr) IV Continuous <Continuous>  dextrose 5%. 1000 milliLiter(s) (50 mL/Hr) IV Continuous <Continuous>  dextrose 50% Injectable 25 Gram(s) IV Push once  dextrose 50% Injectable 12.5 Gram(s) IV Push once  dextrose 50% Injectable 25 Gram(s) IV Push once  glucagon  Injectable 1 milliGRAM(s) IntraMuscular once  insulin glargine Injectable (LANTUS) 12 Unit(s) SubCutaneous at bedtime  insulin lispro (ADMELOG) corrective regimen sliding scale   SubCutaneous three times a day before meals  levoFLOXacin IVPB 750 milliGRAM(s) IV Intermittent every 48 hours  oseltamivir 30 milliGRAM(s) Oral two times a day  propranolol 20 milliGRAM(s) Oral every 8 hours  tamsulosin 0.4 milliGRAM(s) Oral at bedtime    MEDICATIONS  (PRN):  dextrose Oral Gel 15 Gram(s) Oral once PRN Blood Glucose LESS THAN 70 milliGRAM(s)/deciliter       Vital Signs Last 24 Hrs  T(C): 36.7 (26 Jan 2025 05:06), Max: 37.7 (25 Jan 2025 10:46)  T(F): 98.1 (26 Jan 2025 05:06), Max: 99.8 (25 Jan 2025 10:46)  HR: 87 (26 Jan 2025 05:06) (60 - 88)  BP: 118/69 (26 Jan 2025 05:06) (93/63 - 118/69)  BP(mean): --  RR: 20 (26 Jan 2025 05:06) (20 - 20)  SpO2: 96% (26 Jan 2025 05:06) (92% - 97%)    Parameters below as of 26 Jan 2025 05:06  Patient On (Oxygen Delivery Method): nasal cannula  O2 Flow (L/min): 3  Orthostatic VS          I&O's Summary    25 Jan 2025 07:01  -  26 Jan 2025 07:00  --------------------------------------------------------  IN: 0 mL / OUT: 300 mL / NET: -300 mL        Physical Exam:   GENERAL: NAD, well-groomed, well-developed  HEENT: JIMMIE/   Atraumatic, Normocephalic  ENMT: No tonsillar erythema, exudates, or enlargement; Moist mucous membranes, Good dentition, No lesions  NECK: Supple, No JVD, Normal thyroid  CHEST/LUNG: Few crackles R   CVS: Regular rate and rhythm; No murmurs, rubs, or gallops  GI: : Soft, Nontender, Nondistended; Bowel sounds present  NERVOUS SYSTEM:  Alert & Oriented X3  EXTREMITIES: - edema  LYMPH: No lymphadenopathy noted  SKIN: No rashes or lesions  ENDOCRINOLOGY: No Thyromegaly  PSYCH: Appropriate    Labs:  Venous<34<4>>59<<7.435>>Venous<<3><<4><<5<<599>>                            10.8   8.68  )-----------( x        ( 26 Jan 2025 07:31 )             31.4                         11.5   10.34 )-----------( 85       ( 25 Jan 2025 12:15 )             33.2     01-25    127[L]  |  92[L]  |  34[H]  ----------------------------<  562[HH]  3.8   |  20[L]  |  1.78[H]    Ca    8.9      25 Jan 2025 12:15    TPro  6.6  /  Alb  3.0[L]  /  TBili  1.8[H]  /  DBili  x   /  AST  18  /  ALT  11  /  AlkPhos  77  01-25    CAPILLARY BLOOD GLUCOSE      POCT Blood Glucose.: 217 mg/dL (26 Jan 2025 07:56)  POCT Blood Glucose.: 368 mg/dL (25 Jan 2025 21:47)  POCT Blood Glucose.: 396 mg/dL (25 Jan 2025 17:11)  POCT Blood Glucose.: 507 mg/dL (25 Jan 2025 14:20)  POCT Blood Glucose.: 490 mg/dL (25 Jan 2025 14:19)    LIVER FUNCTIONS - ( 25 Jan 2025 12:15 )  Alb: 3.0 g/dL / Pro: 6.6 g/dL / ALK PHOS: 77 U/L / ALT: 11 U/L / AST: 18 U/L / GGT: x             Urinalysis Basic - ( 25 Jan 2025 12:15 )    Color: x / Appearance: x / SG: x / pH: x  Gluc: 562 mg/dL / Ketone: x  / Bili: x / Urobili: x   Blood: x / Protein: x / Nitrite: x   Leuk Esterase: x / RBC: x / WBC x   Sq Epi: x / Non Sq Epi: x / Bacteria: x      D DImer      Studies  Chest X-RAY  < from: Xray Chest 1 View- PORTABLE-Urgent (01.25.25 @ 13:42) >  ACC: [id number] EXAM:  XR CHEST PORTABLE URGENT 1V   ORDERED BY:  PHILLIP NAVARRETE     PROCEDURE DATE:  01/25/2025          INTERPRETATION:  CLINICAL INDICATION: Shortness of Breath    TECHNIQUE: Frontal view of the chest was obtained.    COMPARISON: Chest x-ray 9/12/2024.    FINDINGS:  The heart size is normal.  Right midlung patchy opacities.  No pleural effusion.  No pneumothorax.  No acute osseous abnormalities.    IMPRESSION:  Right midlung patchy opacities.    --- End of Report ---    < end of copied text >

## 2025-01-26 NOTE — CONSULT NOTE ADULT - NS ATTEND AMEND GEN_ALL_CORE FT
seems to be doing  ok ; no sob:  no cugh : no phlegm   ; adm with  influenza:  cont tamiflu: no need for steroids at this time: he looks pretty weak:  await ct scan chest

## 2025-01-26 NOTE — PROGRESS NOTE ADULT - ASSESSMENT
The patient is a 74y Male complaining of shortness of breath.    Acute hypoxic resp failure sec to PNA/Flu:    IV Levofloxacin  Tamiflu  F/up with Blood Cx  ID/Pul consults appreciated  Jonelle    Uncontrolled DM II:    FSSS  Cw Lantus  Endo eval appreciated    CKD III:    BMP  Cr 1.46    Dw family.

## 2025-01-27 LAB
ANION GAP SERPL CALC-SCNC: 14 MMOL/L — SIGNIFICANT CHANGE UP (ref 5–17)
BUN SERPL-MCNC: 32 MG/DL — HIGH (ref 7–23)
CALCIUM SERPL-MCNC: 9.1 MG/DL — SIGNIFICANT CHANGE UP (ref 8.4–10.5)
CHLORIDE SERPL-SCNC: 96 MMOL/L — SIGNIFICANT CHANGE UP (ref 96–108)
CO2 SERPL-SCNC: 22 MMOL/L — SIGNIFICANT CHANGE UP (ref 22–31)
CREAT SERPL-MCNC: 1.57 MG/DL — HIGH (ref 0.5–1.3)
EGFR: 46 ML/MIN/1.73M2 — LOW
GLUCOSE BLDC GLUCOMTR-MCNC: 101 MG/DL — HIGH (ref 70–99)
GLUCOSE BLDC GLUCOMTR-MCNC: 130 MG/DL — HIGH (ref 70–99)
GLUCOSE BLDC GLUCOMTR-MCNC: 199 MG/DL — HIGH (ref 70–99)
GLUCOSE SERPL-MCNC: 129 MG/DL — HIGH (ref 70–99)
HCT VFR BLD CALC: 31.6 % — LOW (ref 39–50)
HGB BLD-MCNC: 10.9 G/DL — LOW (ref 13–17)
LEGIONELLA AG UR QL: NEGATIVE — SIGNIFICANT CHANGE UP
MCHC RBC-ENTMCNC: 29 PG — SIGNIFICANT CHANGE UP (ref 27–34)
MCHC RBC-ENTMCNC: 34.5 G/DL — SIGNIFICANT CHANGE UP (ref 32–36)
MCV RBC AUTO: 84 FL — SIGNIFICANT CHANGE UP (ref 80–100)
NRBC # BLD: 0 /100 WBCS — SIGNIFICANT CHANGE UP (ref 0–0)
NRBC BLD-RTO: 0 /100 WBCS — SIGNIFICANT CHANGE UP (ref 0–0)
PLATELET # BLD AUTO: 121 K/UL — LOW (ref 150–400)
POTASSIUM SERPL-MCNC: 3.4 MMOL/L — LOW (ref 3.5–5.3)
POTASSIUM SERPL-SCNC: 3.4 MMOL/L — LOW (ref 3.5–5.3)
RBC # BLD: 3.76 M/UL — LOW (ref 4.2–5.8)
RBC # FLD: 12.4 % — SIGNIFICANT CHANGE UP (ref 10.3–14.5)
SODIUM SERPL-SCNC: 132 MMOL/L — LOW (ref 135–145)
WBC # BLD: 5.92 K/UL — SIGNIFICANT CHANGE UP (ref 3.8–10.5)
WBC # FLD AUTO: 5.92 K/UL — SIGNIFICANT CHANGE UP (ref 3.8–10.5)

## 2025-01-27 RX ORDER — POTASSIUM CHLORIDE 750 MG/1
20 TABLET, EXTENDED RELEASE ORAL ONCE
Refills: 0 | Status: COMPLETED | OUTPATIENT
Start: 2025-01-27 | End: 2025-01-27

## 2025-01-27 RX ADMIN — INSULIN GLARGINE-YFGN 15 UNIT(S): 100 INJECTION, SOLUTION SUBCUTANEOUS at 21:47

## 2025-01-27 RX ADMIN — Medication 6 UNIT(S): at 17:43

## 2025-01-27 RX ADMIN — IPRATROPIUM BROMIDE AND ALBUTEROL SULFATE 3 MILLILITER(S): .5; 2.5 SOLUTION RESPIRATORY (INHALATION) at 12:08

## 2025-01-27 RX ADMIN — Medication 5000 UNIT(S): at 15:06

## 2025-01-27 RX ADMIN — Medication 6 UNIT(S): at 12:07

## 2025-01-27 RX ADMIN — POTASSIUM CHLORIDE 20 MILLIEQUIVALENT(S): 750 TABLET, EXTENDED RELEASE ORAL at 12:08

## 2025-01-27 RX ADMIN — Medication 5000 UNIT(S): at 05:10

## 2025-01-27 RX ADMIN — OSELTAMIVIR PHOSPHATE 30 MILLIGRAM(S): 75 CAPSULE ORAL at 17:42

## 2025-01-27 RX ADMIN — IPRATROPIUM BROMIDE AND ALBUTEROL SULFATE 3 MILLILITER(S): .5; 2.5 SOLUTION RESPIRATORY (INHALATION) at 17:42

## 2025-01-27 RX ADMIN — Medication 6 UNIT(S): at 07:41

## 2025-01-27 RX ADMIN — IPRATROPIUM BROMIDE AND ALBUTEROL SULFATE 3 MILLILITER(S): .5; 2.5 SOLUTION RESPIRATORY (INHALATION) at 05:09

## 2025-01-27 RX ADMIN — TAMSULOSIN HYDROCHLORIDE 0.4 MILLIGRAM(S): 0.4 CAPSULE ORAL at 21:47

## 2025-01-27 RX ADMIN — OSELTAMIVIR PHOSPHATE 30 MILLIGRAM(S): 75 CAPSULE ORAL at 05:10

## 2025-01-27 RX ADMIN — Medication 1: at 17:40

## 2025-01-27 RX ADMIN — Medication 5000 UNIT(S): at 21:47

## 2025-01-27 NOTE — PROGRESS NOTE ADULT - SUBJECTIVE AND OBJECTIVE BOX
Patient is a 74y old  Male who presents with a chief complaint of The patient is a 74y Male complaining of shortness of breath. (26 Jan 2025 15:22)      SUBJECTIVE / OVERNIGHT EVENTS:    Events noted.  CONSTITUTIONAL: No fever,  or fatigue  RESPIRATORY: No cough, wheezing,  on supp O2  CARDIOVASCULAR: No chest pain, palpitations, dizziness, or leg swelling  GASTROINTESTINAL: No abdominal or epigastric pain.       MEDICATIONS  (STANDING):  albuterol/ipratropium for Nebulization 3 milliLiter(s) Nebulizer every 6 hours  dextrose 5%. 1000 milliLiter(s) (100 mL/Hr) IV Continuous <Continuous>  dextrose 5%. 1000 milliLiter(s) (50 mL/Hr) IV Continuous <Continuous>  dextrose 50% Injectable 25 Gram(s) IV Push once  dextrose 50% Injectable 12.5 Gram(s) IV Push once  dextrose 50% Injectable 25 Gram(s) IV Push once  glucagon  Injectable 1 milliGRAM(s) IntraMuscular once  heparin   Injectable 5000 Unit(s) SubCutaneous every 8 hours  insulin glargine Injectable (LANTUS) 15 Unit(s) SubCutaneous at bedtime  insulin lispro (ADMELOG) corrective regimen sliding scale   SubCutaneous three times a day before meals  insulin lispro Injectable (ADMELOG) 6 Unit(s) SubCutaneous three times a day before meals  levoFLOXacin IVPB 750 milliGRAM(s) IV Intermittent every 48 hours  oseltamivir 30 milliGRAM(s) Oral two times a day  propranolol 20 milliGRAM(s) Oral every 8 hours  tamsulosin 0.4 milliGRAM(s) Oral at bedtime    MEDICATIONS  (PRN):  dextrose Oral Gel 15 Gram(s) Oral once PRN Blood Glucose LESS THAN 70 milliGRAM(s)/deciliter        CAPILLARY BLOOD GLUCOSE      POCT Blood Glucose.: 153 mg/dL (26 Jan 2025 16:16)  POCT Blood Glucose.: 230 mg/dL (26 Jan 2025 11:45)  POCT Blood Glucose.: 217 mg/dL (26 Jan 2025 07:56)  POCT Blood Glucose.: 368 mg/dL (25 Jan 2025 21:47)    I&O's Summary    25 Jan 2025 07:01  -  26 Jan 2025 07:00  --------------------------------------------------------  IN: 0 mL / OUT: 300 mL / NET: -300 mL    26 Jan 2025 07:01  -  26 Jan 2025 19:09  --------------------------------------------------------  IN: 0 mL / OUT: 150 mL / NET: -150 mL        T(C): 37.2 (01-26-25 @ 11:34), Max: 37.2 (01-26-25 @ 11:34)  HR: 70 (01-26-25 @ 11:34) (70 - 87)  BP: 121/74 (01-26-25 @ 11:34) (114/60 - 121/74)  RR: 20 (01-26-25 @ 11:34) (20 - 20)  SpO2: 95% (01-26-25 @ 11:34) (95% - 96%)    PHYSICAL EXAM:    NECK: Supple, No JVD  CHEST/LUNG: Clear to auscultation bilaterally; No wheezing.  HEART: Regular rate and rhythm; No murmurs, rubs, or gallops  ABDOMEN: Soft, Nontender, Nondistended; Bowel sounds present  EXTREMITIES:   No edema  NEUROLOGY: AAO X 3      LABS:                        10.8   8.68  )-----------( 93       ( 26 Jan 2025 07:31 )             31.4     01-26    131[L]  |  99  |  29[H]  ----------------------------<  190[H]  3.9   |  20[L]  |  1.46[H]    Ca    9.1      26 Jan 2025 07:31    TPro  6.6  /  Alb  3.0[L]  /  TBili  1.8[H]  /  DBili  x   /  AST  18  /  ALT  11  /  AlkPhos  77  01-25          Urinalysis Basic - ( 26 Jan 2025 07:31 )    Color: x / Appearance: x / SG: x / pH: x  Gluc: 190 mg/dL / Ketone: x  / Bili: x / Urobili: x   Blood: x / Protein: x / Nitrite: x   Leuk Esterase: x / RBC: x / WBC x   Sq Epi: x / Non Sq Epi: x / Bacteria: x      CAPILLARY BLOOD GLUCOSE      POCT Blood Glucose.: 153 mg/dL (26 Jan 2025 16:16)  POCT Blood Glucose.: 230 mg/dL (26 Jan 2025 11:45)  POCT Blood Glucose.: 217 mg/dL (26 Jan 2025 07:56)  POCT Blood Glucose.: 368 mg/dL (25 Jan 2025 21:47)        RADIOLOGY & ADDITIONAL TESTS:    Imaging Personally Reviewed:    Consultant(s) Notes Reviewed:      Care Discussed with Consultants/Other Providers:    Gregory Mcdonnell MD, CMD, FACP    [address] [place] [address]  Office Tel: [phone]  Cell: [phone]

## 2025-01-27 NOTE — PROGRESS NOTE ADULT - ASSESSMENT
Assessment  DMT2: 74y Male with DM T2 with hyperglycemia admitted with SOB, patient was on oral hypoglycemic agents and insulin at home, now insulin coverage, blood sugars are running high, eating meals, compliant with low carb diet.  Patient is high risk with high level decision making due to uncontrolled diabetes, has increased risk for complications.   SOB: on medications, monitored, no acute events.  HTN: On antihypertensive medications, monitored, stable.        Discussed plan and management with Dr Jeanette Colvin NP - TEAMS  Darryl Reid MD  Cell: [phone]  Office: [phone]      Assessment  DMT2: 74y Male with DM T2 with hyperglycemia admitted with SOB, patient was on oral hypoglycemic agents and insulin at home, now insulin coverage, blood sugars are running high, eating meals, compliant with low carb diet.  Patient is high risk with high level decision making due to uncontrolled diabetes, has increased risk for complications.   SOB: on medications, monitored, no acute events.  HTN: On antihypertensive medications, monitored, stable.        Discussed plan and management with Dr Jeanette Colvin NP - TEAMS  Darryl Reid MD  Cell: [phone]  Office: [phone]

## 2025-01-27 NOTE — PROGRESS NOTE ADULT - SUBJECTIVE AND OBJECTIVE BOX
[facility]  Infectious Disease  Dr Jin, Dr Loyd, Dr Patten, JOANN Naik Eladio  [phone]  after hours and weekends [phone]    Name: SPIKE TERRY  Age: 74y  Gender: Male  MRN: [id number]    Interval History--  Notes reviewed  sleeping arousable  nodding yes no        Allergies    cephalosporins (Other)    Intolerances        Medications--  Antibiotics:  levoFLOXacin IVPB 750 milliGRAM(s) IV Intermittent every 48 hours  oseltamivir 30 milliGRAM(s) Oral two times a day    Immunologic:    Other:  albuterol/ipratropium for Nebulization  dextrose 5%.  dextrose 5%.  dextrose 50% Injectable  dextrose 50% Injectable  dextrose 50% Injectable  dextrose Oral Gel PRN  glucagon  Injectable  heparin   Injectable  insulin glargine Injectable (LANTUS)  insulin lispro (ADMELOG) corrective regimen sliding scale  insulin lispro Injectable (ADMELOG)  propranolol  tamsulosin      Review of Systems--  A 10-point review of systems was obtained.     limited     Review of systems otherwise negative except as previously noted.    Physical Examination--  Vital Signs: T(F): 97.9 (01-27-25 @ 12:44), Max: 98.9 (01-27-25 @ 04:43)  HR: 70 (01-27-25 @ 12:44)  BP: 118/70 (01-27-25 @ 12:44)  RR: 18 (01-27-25 @ 12:44)  SpO2: 97% (01-27-25 @ 12:44)  Wt(kg): --  General: Nontoxic-appearing Male in no acute distress.  HEENT: AT/NC. P  Neck: Not rigid. No sense of mass.  Nodes: None palpable.  Lungs: Clear bilaterally without rales, wheezing or rhonchi  Heart: Regular rate and rhythm.   Abdomen: Bowel sounds present and normoactive. Soft. Nondistended. Nontender.   Extremities: No cyanosis or clubbing. No edema.   Skin: Warm. Dry. Good turgor. No rash. No vasculitic stigmata.  Psychiatric: Appropriate affect and mood for situation.         Laboratory Studies--  CBC                        10.9   5.92  )-----------( 121      ( 27 Jan 2025 07:01 )             31.6       Chemistries  01-27    132[L]  |  96  |  32[H]  ----------------------------<  129[H]  3.4[L]   |  22  |  1.57[H]    Ca    9.1      27 Jan 2025 07:03        Culture Data      < from: CT Chest No Cont (01.26.25 @ 11:57) >  PROCEDURE DATE:  01/26/2025          INTERPRETATION:  CLINICAL INFORMATION: Shortness of breath. Abnormal   chest x-ray.    COMPARISON: CT chest 7/9/2024    CONTRAST/COMPLICATIONS:  IV Contrast: NONE  Oral Contrast: NONE  .    PROCEDURE:  CT scan of the chest was obtained without intravenous contrast.    FINDINGS:    AIRWAYS/LUNGS/PLEURA: Patent central airways. Right upper, middle, and   lower lobe consolidation and groundglass opacities. Right middle and   lower lobe bronchiectasis and bronchial wall thickening. Scattered   bilateral peripheral nodules and left upper lobe tubular opacities, which   reflect bronchial impaction. Lingular linear scarring and bronchiectasis.   Scattered bibasilar predominant pulmonary cysts. Bilateral calcified   granulomas. Trace right pleural effusion. Focal left posterior pleural   calcification.    MEDIASTINUM AND WENDY: Calcified mediastinal and hilar lymph nodes.   Slightly prominent rightparatracheal lymph node measuring 1.8 x 0.8 cm   in size. Difficult to actually measure with there is hilar adenopathy   however appears not significantly changed in appearance since 7/24 exam.    VESSELS: Aortic calcifications. Coronary artery calcifications.    HEART: Heart size is normal. No pericardial effusion.    VISUALIZED UPPER ABDOMEN: Multiple calcified central abdominal and   bilateral crural lymph nodes. Multiple calcified splenic parenchymal   focus side.    CHEST WALL AND BONES: Thoracic spondylosis. A 2.2 cm left lower chest   wall lipoma.    IMPRESSION:    Right lung consolidation and groundglass opacities, compatible with   infectious inflammatory etiologies recommend follow-up CT chest in 8   weeks to ensure resolution.    Right middle and lower lobe predominant bronchiectasi    < end of copied text >

## 2025-01-27 NOTE — PROGRESS NOTE ADULT - SUBJECTIVE AND OBJECTIVE BOX
Date of Service: 01-27-25 @ 13:41    Patient is a 74y old  Male who presents with a chief complaint of The patient is a 74y Male complaining of shortness of breath. (26 Jan 2025 15:22)      Any change in ROS:   Denies CP, SOB at rest  +CLARKE but improving  O2 sats 96% on 3LNC     MEDICATIONS  (STANDING):  albuterol/ipratropium for Nebulization 3 milliLiter(s) Nebulizer every 6 hours  dextrose 5%. 1000 milliLiter(s) (100 mL/Hr) IV Continuous <Continuous>  dextrose 5%. 1000 milliLiter(s) (50 mL/Hr) IV Continuous <Continuous>  dextrose 50% Injectable 25 Gram(s) IV Push once  dextrose 50% Injectable 12.5 Gram(s) IV Push once  dextrose 50% Injectable 25 Gram(s) IV Push once  glucagon  Injectable 1 milliGRAM(s) IntraMuscular once  heparin   Injectable 5000 Unit(s) SubCutaneous every 8 hours  insulin glargine Injectable (LANTUS) 15 Unit(s) SubCutaneous at bedtime  insulin lispro (ADMELOG) corrective regimen sliding scale   SubCutaneous three times a day before meals  insulin lispro Injectable (ADMELOG) 6 Unit(s) SubCutaneous three times a day before meals  levoFLOXacin IVPB 750 milliGRAM(s) IV Intermittent every 48 hours  oseltamivir 30 milliGRAM(s) Oral two times a day  propranolol 20 milliGRAM(s) Oral every 8 hours  tamsulosin 0.4 milliGRAM(s) Oral at bedtime    MEDICATIONS  (PRN):  dextrose Oral Gel 15 Gram(s) Oral once PRN Blood Glucose LESS THAN 70 milliGRAM(s)/deciliter    Vital Signs Last 24 Hrs  T(C): 36.6 (27 Jan 2025 12:44), Max: 37.2 (27 Jan 2025 04:43)  T(F): 97.9 (27 Jan 2025 12:44), Max: 98.9 (27 Jan 2025 04:43)  HR: 70 (27 Jan 2025 12:44) (69 - 84)  BP: 118/70 (27 Jan 2025 12:44) (118/70 - 139/73)  BP(mean): --  RR: 18 (27 Jan 2025 12:44) (18 - 18)  SpO2: 97% (27 Jan 2025 12:44) (94% - 97%)    Parameters below as of 27 Jan 2025 12:44  Patient On (Oxygen Delivery Method): nasal cannula  O2 Flow (L/min): 3      I&O's Summary    26 Jan 2025 07:01  -  27 Jan 2025 07:00  --------------------------------------------------------  IN: 0 mL / OUT: 675 mL / NET: -675 mL          Physical Exam:   GENERAL: NAD, well-groomed, well-developed  HEENT: JIMMIE/   Atraumatic, Normocephalic  ENMT: No tonsillar erythema, exudates, or enlargement; Moist mucous membranes, Good dentition, No lesions  NECK: Supple, No JVD, Normal thyroid  CHEST/LUNG: Decreased BS R; no wheeze   CVS: Regular rate and rhythm; No murmurs, rubs, or gallops  GI: : Soft, Nontender, Nondistended; Bowel sounds present  NERVOUS SYSTEM:  Alert & Oriented X3, Good concentration; Motor Strength 5/5 B/L upper and lower extremities; DTRs 2+ intact and symmetric  EXTREMITIES:  2+ Peripheral Pulses, No clubbing, cyanosis, or edema  LYMPH: No lymphadenopathy noted  SKIN: No rashes or lesions  ENDOCRINOLOGY: No Thyromegaly  PSYCH: Appropriate    Labs:  23                            10.9   5.92  )-----------( 121      ( 27 Jan 2025 07:01 )             31.6                         10.8   8.68  )-----------( 93       ( 26 Jan 2025 07:31 )             31.4                         11.5   10.34 )-----------( 85       ( 25 Jan 2025 12:15 )             33.2     01-27    132[L]  |  96  |  32[H]  ----------------------------<  129[H]  3.4[L]   |  22  |  1.57[H]  01-26    131[L]  |  99  |  29[H]  ----------------------------<  190[H]  3.9   |  20[L]  |  1.46[H]  01-25    127[L]  |  92[L]  |  34[H]  ----------------------------<  562[HH]  3.8   |  20[L]  |  1.78[H]    Ca    9.1      27 Jan 2025 07:03  Ca    9.1      26 Jan 2025 07:31    TPro  6.6  /  Alb  3.0[L]  /  TBili  1.8[H]  /  DBili  x   /  AST  18  /  ALT  11  /  AlkPhos  77  01-25    CAPILLARY BLOOD GLUCOSE      POCT Blood Glucose.: 101 mg/dL (27 Jan 2025 11:36)  POCT Blood Glucose.: 130 mg/dL (27 Jan 2025 07:18)  POCT Blood Glucose.: 133 mg/dL (26 Jan 2025 22:02)  POCT Blood Glucose.: 153 mg/dL (26 Jan 2025 16:16)          Urinalysis Basic - ( 27 Jan 2025 07:03 )    Color: x / Appearance: x / SG: x / pH: x  Gluc: 129 mg/dL / Ketone: x  / Bili: x / Urobili: x   Blood: x / Protein: x / Nitrite: x   Leuk Esterase: x / RBC: x / WBC x   Sq Epi: x / Non Sq Epi: x / Bacteria: x      Procalcitonin: 13.90 ng/mL (01-26 @ 07:31)    Studies  < from: CT Chest No Cont (01.26.25 @ 11:57) >  FINDINGS:    AIRWAYS/LUNGS/PLEURA: Patent central airways. Right upper, middle, and   lower lobe consolidation and groundglass opacities. Right middle and   lower lobe bronchiectasis and bronchial wall thickening. Scattered   bilateral peripheral nodules and left upper lobe tubular opacities, which   reflect bronchial impaction. Lingular linear scarring and bronchiectasis.   Scattered bibasilar predominant pulmonary cysts. Bilateral calcified   granulomas. Trace right pleural effusion. Focal left posterior pleural   calcification.    MEDIASTINUM AND WENDY: Calcified mediastinal and hilar lymph nodes.   Slightly prominent rightparatracheal lymph node measuring 1.8 x 0.8 cm   in size. Difficult to actually measure with there is hilar adenopathy   however appears not significantly changed in appearance since 7/24 exam.    VESSELS: Aortic calcifications. Coronary artery calcifications.    HEART: Heart size is normal. No pericardial effusion.    VISUALIZED UPPER ABDOMEN: Multiple calcified central abdominal and   bilateral crural lymph nodes. Multiple calcified splenic parenchymal   focus side.    CHEST WALL AND BONES: Thoracic spondylosis. A 2.2 cm left lower chest   wall lipoma.    IMPRESSION:    Right lung consolidation and groundglass opacities, compatible with   infectious inflammatory etiologies recommend follow-up CT chest in 8   weeks to ensure resolution.    Right middle and lower lobe predominant bronchiectasis.      --- End of Report ---    < end of copied text >

## 2025-01-27 NOTE — PROGRESS NOTE ADULT - SUBJECTIVE AND OBJECTIVE BOX
Chief complaint  Patient is a 74y old  Male who presents with a chief complaint of The patient is a 74y Male complaining of shortness of breath. (27 Jan 2025 15:11)         Labs and Fingersticks  CAPILLARY BLOOD GLUCOSE      POCT Blood Glucose.: 101 mg/dL (27 Jan 2025 11:36)  POCT Blood Glucose.: 130 mg/dL (27 Jan 2025 07:18)  POCT Blood Glucose.: 133 mg/dL (26 Jan 2025 22:02)  POCT Blood Glucose.: 153 mg/dL (26 Jan 2025 16:16)      Anion Gap: 14 (01-27 @ 07:03)  Anion Gap: 12 (01-26 @ 07:31)      Calcium: 9.1 (01-27 @ 07:03)  Calcium: 9.1 (01-26 @ 07:31)          01-27    132[L]  |  96  |  32[H]  ----------------------------<  129[H]  3.4[L]   |  22  |  1.57[H]    Ca    9.1      27 Jan 2025 07:03                          10.9   5.92  )-----------( 121      ( 27 Jan 2025 07:01 )             31.6     Medications  MEDICATIONS  (STANDING):  albuterol/ipratropium for Nebulization 3 milliLiter(s) Nebulizer every 6 hours  dextrose 5%. 1000 milliLiter(s) (100 mL/Hr) IV Continuous <Continuous>  dextrose 5%. 1000 milliLiter(s) (50 mL/Hr) IV Continuous <Continuous>  dextrose 50% Injectable 25 Gram(s) IV Push once  dextrose 50% Injectable 12.5 Gram(s) IV Push once  dextrose 50% Injectable 25 Gram(s) IV Push once  glucagon  Injectable 1 milliGRAM(s) IntraMuscular once  heparin   Injectable 5000 Unit(s) SubCutaneous every 8 hours  insulin glargine Injectable (LANTUS) 15 Unit(s) SubCutaneous at bedtime  insulin lispro (ADMELOG) corrective regimen sliding scale   SubCutaneous three times a day before meals  insulin lispro Injectable (ADMELOG) 6 Unit(s) SubCutaneous three times a day before meals  levoFLOXacin IVPB 750 milliGRAM(s) IV Intermittent every 48 hours  oseltamivir 30 milliGRAM(s) Oral two times a day  propranolol 20 milliGRAM(s) Oral every 8 hours  tamsulosin 0.4 milliGRAM(s) Oral at bedtime      Physical Exam  General: Patient comfortable in bed   Vital Signs Last 12 Hrs  T(F): 97.9 (01-27-25 @ 12:44), Max: 98.9 (01-27-25 @ 04:43)  HR: 70 (01-27-25 @ 12:44) (69 - 70)  BP: 118/70 (01-27-25 @ 12:44) (118/70 - 139/73)  BP(mean): --  RR: 18 (01-27-25 @ 12:44) (18 - 18)  SpO2: 97% (01-27-25 @ 12:44) (97% - 97%)    CVS: S1S2   Respiratory: No wheezing, no crepitations  GI: Abdomen soft, bowel sounds positive  Musculoskeletal:  moves all extremities  : Voiding       Chief complaint  Patient is a 74y old  Male who presents with a chief complaint of The patient is a 74y Male complaining of shortness of breath. (27 Jan 2025 15:11)         Labs and Fingersticks  CAPILLARY BLOOD GLUCOSE      POCT Blood Glucose.: 101 mg/dL (27 Jan 2025 11:36)  POCT Blood Glucose.: 130 mg/dL (27 Jan 2025 07:18)  POCT Blood Glucose.: 133 mg/dL (26 Jan 2025 22:02)  POCT Blood Glucose.: 153 mg/dL (26 Jan 2025 16:16)      Anion Gap: 14 (01-27 @ 07:03)  Anion Gap: 12 (01-26 @ 07:31)      Calcium: 9.1 (01-27 @ 07:03)  Calcium: 9.1 (01-26 @ 07:31)          01-27    132[L]  |  96  |  32[H]  ----------------------------<  129[H]  3.4[L]   |  22  |  1.57[H]    Ca    9.1      27 Jan 2025 07:03                          10.9   5.92  )-----------( 121      ( 27 Jan 2025 07:01 )             31.6     Medications  MEDICATIONS  (STANDING):  albuterol/ipratropium for Nebulization 3 milliLiter(s) Nebulizer every 6 hours  dextrose 5%. 1000 milliLiter(s) (100 mL/Hr) IV Continuous <Continuous>  dextrose 5%. 1000 milliLiter(s) (50 mL/Hr) IV Continuous <Continuous>  dextrose 50% Injectable 25 Gram(s) IV Push once  dextrose 50% Injectable 12.5 Gram(s) IV Push once  dextrose 50% Injectable 25 Gram(s) IV Push once  glucagon  Injectable 1 milliGRAM(s) IntraMuscular once  heparin   Injectable 5000 Unit(s) SubCutaneous every 8 hours  insulin glargine Injectable (LANTUS) 15 Unit(s) SubCutaneous at bedtime  insulin lispro (ADMELOG) corrective regimen sliding scale   SubCutaneous three times a day before meals  insulin lispro Injectable (ADMELOG) 6 Unit(s) SubCutaneous three times a day before meals  levoFLOXacin IVPB 750 milliGRAM(s) IV Intermittent every 48 hours  oseltamivir 30 milliGRAM(s) Oral two times a day  propranolol 20 milliGRAM(s) Oral every 8 hours  tamsulosin 0.4 milliGRAM(s) Oral at bedtime      Physical Exam  General: Patient comfortable in bed   Vital Signs Last 12 Hrs  T(F): 97.9 (01-27-25 @ 12:44), Max: 98.9 (01-27-25 @ 04:43)  HR: 70 (01-27-25 @ 12:44) (69 - 70)  BP: 118/70 (01-27-25 @ 12:44) (118/70 - 139/73)  BP(mean): --  RR: 18 (01-27-25 @ 12:44) (18 - 18)  SpO2: 97% (01-27-25 @ 12:44) (97% - 97%)    CVS: S1S2   Respiratory: No wheezing, no crepitations  GI: Abdomen soft, bowel sounds positive  Musculoskeletal:  moves all extremities  : Voiding

## 2025-01-27 NOTE — PROGRESS NOTE ADULT - ASSESSMENT
The patient is a 74y Male complaining of shortness of breath.    Acute hypoxic resp failure sec to PNA/Flu:    IV Levofloxacin  Tamiflu  F/up with Blood Cx  ID/Pul consults appreciated  Duoneb    Uncontrolled DM II:    FSSS  Cw Lantus  Endo f/up appreciated    CKD III:    BMP  Cr 1.46    Dw family.

## 2025-01-27 NOTE — PROGRESS NOTE ADULT - ASSESSMENT
74m with  HTN, DM, BPH, ckd, prior history of TB, previous smoker presents to ED for viral URI symptoms, generalized weakness and SOB.    influenza a + superimposed bacterial pna  thrombocytopenia     cxr right lobe infiltrate  mrsa neg  urine legionella neg     plan  follow blood cx  check procal  tamiflu X 5 days  levofloxacin day 3 of 5-7    supportive oxygen/nebs/inhalers  will need surveillance imaging in 6-10 weeks to document resolution    pt evaluated face to face time in addition to reviewing history,  labs, microbiology and imaging.  antibiotic stewardship, local antibiogram, infection control strategies and potential transmission issues taken in to consideration at time of  treatment decision making process.

## 2025-01-28 LAB
ANION GAP SERPL CALC-SCNC: 16 MMOL/L — SIGNIFICANT CHANGE UP (ref 5–17)
BUN SERPL-MCNC: 29 MG/DL — HIGH (ref 7–23)
CALCIUM SERPL-MCNC: 9.2 MG/DL — SIGNIFICANT CHANGE UP (ref 8.4–10.5)
CHLORIDE SERPL-SCNC: 96 MMOL/L — SIGNIFICANT CHANGE UP (ref 96–108)
CO2 SERPL-SCNC: 19 MMOL/L — LOW (ref 22–31)
CREAT SERPL-MCNC: 1.44 MG/DL — HIGH (ref 0.5–1.3)
EGFR: 51 ML/MIN/1.73M2 — LOW
GLUCOSE BLDC GLUCOMTR-MCNC: 170 MG/DL — HIGH (ref 70–99)
GLUCOSE BLDC GLUCOMTR-MCNC: 178 MG/DL — HIGH (ref 70–99)
GLUCOSE BLDC GLUCOMTR-MCNC: 190 MG/DL — HIGH (ref 70–99)
GLUCOSE BLDC GLUCOMTR-MCNC: 191 MG/DL — HIGH (ref 70–99)
GLUCOSE BLDC GLUCOMTR-MCNC: 277 MG/DL — HIGH (ref 70–99)
GLUCOSE SERPL-MCNC: 174 MG/DL — HIGH (ref 70–99)
POTASSIUM SERPL-MCNC: 3.2 MMOL/L — LOW (ref 3.5–5.3)
POTASSIUM SERPL-SCNC: 3.2 MMOL/L — LOW (ref 3.5–5.3)
SODIUM SERPL-SCNC: 131 MMOL/L — LOW (ref 135–145)

## 2025-01-28 RX ADMIN — TAMSULOSIN HYDROCHLORIDE 0.4 MILLIGRAM(S): 0.4 CAPSULE ORAL at 22:39

## 2025-01-28 RX ADMIN — OSELTAMIVIR PHOSPHATE 30 MILLIGRAM(S): 75 CAPSULE ORAL at 17:12

## 2025-01-28 RX ADMIN — IPRATROPIUM BROMIDE AND ALBUTEROL SULFATE 3 MILLILITER(S): .5; 2.5 SOLUTION RESPIRATORY (INHALATION) at 11:54

## 2025-01-28 RX ADMIN — Medication 6 UNIT(S): at 11:56

## 2025-01-28 RX ADMIN — Medication 6 UNIT(S): at 17:12

## 2025-01-28 RX ADMIN — INSULIN GLARGINE-YFGN 15 UNIT(S): 100 INJECTION, SOLUTION SUBCUTANEOUS at 22:39

## 2025-01-28 RX ADMIN — Medication 5000 UNIT(S): at 14:13

## 2025-01-28 RX ADMIN — Medication 5000 UNIT(S): at 05:03

## 2025-01-28 RX ADMIN — IPRATROPIUM BROMIDE AND ALBUTEROL SULFATE 3 MILLILITER(S): .5; 2.5 SOLUTION RESPIRATORY (INHALATION) at 17:13

## 2025-01-28 RX ADMIN — IPRATROPIUM BROMIDE AND ALBUTEROL SULFATE 3 MILLILITER(S): .5; 2.5 SOLUTION RESPIRATORY (INHALATION) at 05:03

## 2025-01-28 RX ADMIN — Medication 3: at 17:12

## 2025-01-28 RX ADMIN — Medication 6 UNIT(S): at 08:00

## 2025-01-28 RX ADMIN — Medication 1: at 08:00

## 2025-01-28 RX ADMIN — OSELTAMIVIR PHOSPHATE 30 MILLIGRAM(S): 75 CAPSULE ORAL at 05:03

## 2025-01-28 RX ADMIN — IPRATROPIUM BROMIDE AND ALBUTEROL SULFATE 3 MILLILITER(S): .5; 2.5 SOLUTION RESPIRATORY (INHALATION) at 00:10

## 2025-01-28 RX ADMIN — Medication 1: at 11:55

## 2025-01-28 RX ADMIN — Medication 5000 UNIT(S): at 22:39

## 2025-01-28 RX ADMIN — IPRATROPIUM BROMIDE AND ALBUTEROL SULFATE 3 MILLILITER(S): .5; 2.5 SOLUTION RESPIRATORY (INHALATION) at 23:19

## 2025-01-28 NOTE — PROGRESS NOTE ADULT - SUBJECTIVE AND OBJECTIVE BOX
Date of Service: 01-28-25 @ 13:41    Patient is a 74y old  Male who presents with a chief complaint of The patient is a 74y Male complaining of shortness of breath. (28 Jan 2025 10:35)      Any change in ROS:   No new respiratory events overnight. Denies SOB/CP.  O2 sats 93% RA    MEDICATIONS  (STANDING):  albuterol/ipratropium for Nebulization 3 milliLiter(s) Nebulizer every 6 hours  dextrose 5%. 1000 milliLiter(s) (100 mL/Hr) IV Continuous <Continuous>  dextrose 5%. 1000 milliLiter(s) (50 mL/Hr) IV Continuous <Continuous>  dextrose 50% Injectable 25 Gram(s) IV Push once  dextrose 50% Injectable 12.5 Gram(s) IV Push once  dextrose 50% Injectable 25 Gram(s) IV Push once  glucagon  Injectable 1 milliGRAM(s) IntraMuscular once  heparin   Injectable 5000 Unit(s) SubCutaneous every 8 hours  insulin glargine Injectable (LANTUS) 15 Unit(s) SubCutaneous at bedtime  insulin lispro (ADMELOG) corrective regimen sliding scale   SubCutaneous three times a day before meals  insulin lispro Injectable (ADMELOG) 6 Unit(s) SubCutaneous three times a day before meals  levoFLOXacin IVPB 750 milliGRAM(s) IV Intermittent every 48 hours  oseltamivir 30 milliGRAM(s) Oral two times a day  propranolol 20 milliGRAM(s) Oral every 8 hours  tamsulosin 0.4 milliGRAM(s) Oral at bedtime    MEDICATIONS  (PRN):  dextrose Oral Gel 15 Gram(s) Oral once PRN Blood Glucose LESS THAN 70 milliGRAM(s)/deciliter    Vital Signs Last 24 Hrs  T(C): 36.8 (28 Jan 2025 11:35), Max: 37.3 (27 Jan 2025 19:39)  T(F): 98.2 (28 Jan 2025 11:35), Max: 99.1 (27 Jan 2025 19:39)  HR: 69 (28 Jan 2025 11:35) (69 - 79)  BP: 126/73 (28 Jan 2025 11:35) (116/75 - 126/73)  BP(mean): --  RR: 16 (28 Jan 2025 11:35) (16 - 18)  SpO2: 92% (28 Jan 2025 11:35) (92% - 95%)    Parameters below as of 28 Jan 2025 11:35  Patient On (Oxygen Delivery Method): room air        I&O's Summary    27 Jan 2025 07:01  -  28 Jan 2025 07:00  --------------------------------------------------------  IN: 220 mL / OUT: 200 mL / NET: 20 mL          Physical Exam:   GENERAL: NAD, well-groomed, well-developed  HEENT: JIMMIE/   Atraumatic, Normocephalic  ENMT: No tonsillar erythema, exudates, or enlargement; Moist mucous membranes, Good dentition, No lesions  NECK: Supple, No JVD, Normal thyroid  CHEST/LUNG: Crackles R   CVS: Regular rate and rhythm; No murmurs, rubs, or gallops  GI: : Soft, Nontender, Nondistended; Bowel sounds present  NERVOUS SYSTEM:  Alert & Oriented X3  EXTREMITIES:  2+ Peripheral Pulses, No clubbing, cyanosis, or edema  LYMPH: No lymphadenopathy noted  SKIN: No rashes or lesions  ENDOCRINOLOGY: No Thyromegaly  PSYCH: Appropriate    Labs:  23                            10.9   5.92  )-----------( 121      ( 27 Jan 2025 07:01 )             31.6                         10.8   8.68  )-----------( 93       ( 26 Jan 2025 07:31 )             31.4                         11.5   10.34 )-----------( 85       ( 25 Jan 2025 12:15 )             33.2     01-28    131[L]  |  96  |  29[H]  ----------------------------<  174[H]  3.2[L]   |  19[L]  |  1.44[H]  01-27    132[L]  |  96  |  32[H]  ----------------------------<  129[H]  3.4[L]   |  22  |  1.57[H]  01-26    131[L]  |  99  |  29[H]  ----------------------------<  190[H]  3.9   |  20[L]  |  1.46[H]  01-25    127[L]  |  92[L]  |  34[H]  ----------------------------<  562[HH]  3.8   |  20[L]  |  1.78[H]    Ca    9.2      28 Jan 2025 07:19  Ca    9.1      27 Jan 2025 07:03    TPro  6.6  /  Alb  3.0[L]  /  TBili  1.8[H]  /  DBili  x   /  AST  18  /  ALT  11  /  AlkPhos  77  01-25    CAPILLARY BLOOD GLUCOSE      POCT Blood Glucose.: 178 mg/dL (28 Jan 2025 11:32)  POCT Blood Glucose.: 170 mg/dL (28 Jan 2025 07:28)  POCT Blood Glucose.: 199 mg/dL (27 Jan 2025 21:15)  POCT Blood Glucose.: 191 mg/dL (27 Jan 2025 16:40)          Urinalysis Basic - ( 28 Jan 2025 07:19 )    Color: x / Appearance: x / SG: x / pH: x  Gluc: 174 mg/dL / Ketone: x  / Bili: x / Urobili: x   Blood: x / Protein: x / Nitrite: x   Leuk Esterase: x / RBC: x / WBC x   Sq Epi: x / Non Sq Epi: x / Bacteria: x      Procalcitonin: 13.90 ng/mL (01-26 @ 07:31)      Studies  < from: CT Chest No Cont (01.26.25 @ 11:57) >    INTERPRETATION:  CLINICAL INFORMATION: Shortness of breath. Abnormal   chest x-ray.    COMPARISON: CT chest 7/9/2024    CONTRAST/COMPLICATIONS:  IV Contrast: NONE  Oral Contrast: NONE  .    PROCEDURE:  CT scan of the chest was obtained without intravenous contrast.    FINDINGS:    AIRWAYS/LUNGS/PLEURA: Patent central airways. Right upper, middle, and   lower lobe consolidation and groundglass opacities. Right middle and   lower lobe bronchiectasis and bronchial wall thickening. Scattered   bilateral peripheral nodules and left upper lobe tubular opacities, which   reflect bronchial impaction. Lingular linear scarring and bronchiectasis.   Scattered bibasilar predominant pulmonary cysts. Bilateral calcified   granulomas. Trace right pleural effusion. Focal left posterior pleural   calcification.    MEDIASTINUM AND WENDY: Calcified mediastinal and hilar lymph nodes.   Slightly prominent rightparatracheal lymph node measuring 1.8 x 0.8 cm   in size. Difficult to actually measure with there is hilar adenopathy   however appears not significantly changed in appearance since 7/24 exam.    VESSELS: Aortic calcifications. Coronary artery calcifications.    HEART: Heart size is normal. No pericardial effusion.    VISUALIZED UPPER ABDOMEN: Multiple calcified central abdominal and   bilateral crural lymph nodes. Multiple calcified splenic parenchymal   focus side.    CHEST WALL AND BONES: Thoracic spondylosis. A 2.2 cm left lower chest   wall lipoma.    IMPRESSION:    Right lung consolidation and groundglass opacities, compatible with   infectious inflammatory etiologies recommend follow-up CT chest in 8   weeks to ensure resolution.    Right middle and lower lobe predominant bronchiectasis.      --- End of Report ---          < end of copied text >

## 2025-01-28 NOTE — PROGRESS NOTE ADULT - ASSESSMENT
The patient is a 74y Male complaining of shortness of breath.    Acute hypoxic resp failure sec to PNA/Flu:    IV Levofloxacin  Tamiflu  ID/Pul f/up appreciated  Duoneb    Uncontrolled DM II:    FSSS  Cw Lantus  Endo f/up appreciated    Dc planning tomorrow

## 2025-01-28 NOTE — PROGRESS NOTE ADULT - ASSESSMENT
Assessment  DMT2: 74y Male with DM T2 with hyperglycemia admitted with SOB, patient was on oral hypoglycemic agents and insulin at home, now insulin coverage, eating meals, blood sugars are improving   Patient is high risk with high level decision making due to uncontrolled diabetes, has increased risk for complications.   SOB: on medications, monitored, no acute events.  HTN: On antihypertensive medications, monitored, stable.      Darryl Reid MD  Cell: [phone]  Office: [phone]

## 2025-01-28 NOTE — PROGRESS NOTE ADULT - SUBJECTIVE AND OBJECTIVE BOX
[facility]  Infectious Disease  Dr Jin, Dr Loyd, Dr Patten, JOANN Naik Eladio  [phone]  after hours and weekends [phone]    Name: SPIKE TERRY  Age: 74y  Gender: Male  MRN: [id number]    Interval History--  Notes reviewed  arousable  nodding         Allergies    cephalosporins (Other)    Intolerances        Medications--  Antibiotics:  levoFLOXacin IVPB 750 milliGRAM(s) IV Intermittent every 48 hours  oseltamivir 30 milliGRAM(s) Oral two times a day    Immunologic:    Other:  albuterol/ipratropium for Nebulization  dextrose 5%.  dextrose 5%.  dextrose 50% Injectable  dextrose 50% Injectable  dextrose 50% Injectable  dextrose Oral Gel PRN  glucagon  Injectable  heparin   Injectable  insulin glargine Injectable (LANTUS)  insulin lispro (ADMELOG) corrective regimen sliding scale  insulin lispro Injectable (ADMELOG)  propranolol  tamsulosin      Review of Systems--  A 10-point review of systems was obtained.     limited     Review of systems otherwise negative except as previously noted.    Physical Examination--  Vital Signs: T(F): 98.2 (01-28-25 @ 11:35), Max: 99.1 (01-27-25 @ 19:39)  HR: 69 (01-28-25 @ 11:35)  BP: 126/73 (01-28-25 @ 11:35)  RR: 16 (01-28-25 @ 11:35)  SpO2: 92% (01-28-25 @ 11:35)  Wt(kg): --  General: Nontoxic-appearing Male in no acute distress.  HEENT: AT/NC.   Neck: Not rigid. No sense of mass.  Nodes: None palpable.  Lungs: Clear bilaterally without rales, wheezing or rhonchi  Heart: Regular rate and rhythm.   Abdomen: Bowel sounds present and normoactive. Soft. Nondistended. Nontender. .  Extremities: No cyanosis or clubbing. No edema.   Skin: Warm. Dry. Good turgor. No rash. No vasculitic stigmata.          Laboratory Studies--  CBC                        10.9   5.92  )-----------( 121      ( 27 Jan 2025 07:01 )             31.6       Chemistries  01-28    131[L]  |  96  |  29[H]  ----------------------------<  174[H]  3.2[L]   |  19[L]  |  1.44[H]    Ca    9.2      28 Jan 2025 07:19        Culture Data

## 2025-01-28 NOTE — PROGRESS NOTE ADULT - SUBJECTIVE AND OBJECTIVE BOX
Chief complaint    Patient is a 74y old  Male who presents with a chief complaint of The patient is a 74y Male complaining of shortness of breath. (27 Jan 2025 15:21)   Review of systems  Patient appears comfortable.    Labs and Fingersticks  CAPILLARY BLOOD GLUCOSE      POCT Blood Glucose.: 170 mg/dL (28 Jan 2025 07:28)  POCT Blood Glucose.: 199 mg/dL (27 Jan 2025 21:15)  POCT Blood Glucose.: 191 mg/dL (27 Jan 2025 16:40)  POCT Blood Glucose.: 101 mg/dL (27 Jan 2025 11:36)      Anion Gap: 16 (01-28 @ 07:19)  Anion Gap: 14 (01-27 @ 07:03)      Calcium: 9.2 (01-28 @ 07:19)  Calcium: 9.1 (01-27 @ 07:03)          01-28    131[L]  |  96  |  29[H]  ----------------------------<  174[H]  3.2[L]   |  19[L]  |  1.44[H]    Ca    9.2      28 Jan 2025 07:19                          10.9   5.92  )-----------( 121      ( 27 Jan 2025 07:01 )             31.6     Medications  MEDICATIONS  (STANDING):  albuterol/ipratropium for Nebulization 3 milliLiter(s) Nebulizer every 6 hours  dextrose 5%. 1000 milliLiter(s) (100 mL/Hr) IV Continuous <Continuous>  dextrose 5%. 1000 milliLiter(s) (50 mL/Hr) IV Continuous <Continuous>  dextrose 50% Injectable 25 Gram(s) IV Push once  dextrose 50% Injectable 12.5 Gram(s) IV Push once  dextrose 50% Injectable 25 Gram(s) IV Push once  glucagon  Injectable 1 milliGRAM(s) IntraMuscular once  heparin   Injectable 5000 Unit(s) SubCutaneous every 8 hours  insulin glargine Injectable (LANTUS) 15 Unit(s) SubCutaneous at bedtime  insulin lispro (ADMELOG) corrective regimen sliding scale   SubCutaneous three times a day before meals  insulin lispro Injectable (ADMELOG) 6 Unit(s) SubCutaneous three times a day before meals  levoFLOXacin IVPB 750 milliGRAM(s) IV Intermittent every 48 hours  oseltamivir 30 milliGRAM(s) Oral two times a day  propranolol 20 milliGRAM(s) Oral every 8 hours  tamsulosin 0.4 milliGRAM(s) Oral at bedtime      Physical Exam  General: Patient appears comfortable.  Vital Signs Last 12 Hrs  T(F): 99 (01-28-25 @ 04:44), Max: 99 (01-28-25 @ 04:44)  HR: 75 (01-28-25 @ 04:44) (75 - 75)  BP: 116/75 (01-28-25 @ 04:44) (116/75 - 116/75)  BP(mean): --  RR: 18 (01-28-25 @ 04:44) (18 - 18)  SpO2: 93% (01-28-25 @ 04:44) (93% - 93%)  Neck: No palpable thyroid nodules.  CVS: S1S2, No murmurs  Respiratory: No wheezing, no crepitations  GI: Abdomen soft, non tender.    Diagnostics        Radiology:

## 2025-01-28 NOTE — PROGRESS NOTE ADULT - SUBJECTIVE AND OBJECTIVE BOX
Patient is a 74y old  Male who presents with a chief complaint of The patient is a 74y Male complaining of shortness of breath. (28 Jan 2025 10:35)      SUBJECTIVE / OVERNIGHT EVENTS:    Events noted.  CONSTITUTIONAL: No fever,  or fatigue  RESPIRATORY: Cough/On supp O2  CARDIOVASCULAR: No chest pain, palpitations, dizziness, or leg swelling  GASTROINTESTINAL: No abdominal or epigastric pain. No nausea, vomiting.  NEUROLOGICAL: No headache    MEDICATIONS  (STANDING):  albuterol/ipratropium for Nebulization 3 milliLiter(s) Nebulizer every 6 hours  dextrose 5%. 1000 milliLiter(s) (100 mL/Hr) IV Continuous <Continuous>  dextrose 5%. 1000 milliLiter(s) (50 mL/Hr) IV Continuous <Continuous>  dextrose 50% Injectable 25 Gram(s) IV Push once  dextrose 50% Injectable 12.5 Gram(s) IV Push once  dextrose 50% Injectable 25 Gram(s) IV Push once  glucagon  Injectable 1 milliGRAM(s) IntraMuscular once  heparin   Injectable 5000 Unit(s) SubCutaneous every 8 hours  insulin glargine Injectable (LANTUS) 15 Unit(s) SubCutaneous at bedtime  insulin lispro (ADMELOG) corrective regimen sliding scale   SubCutaneous three times a day before meals  insulin lispro Injectable (ADMELOG) 6 Unit(s) SubCutaneous three times a day before meals  levoFLOXacin IVPB 750 milliGRAM(s) IV Intermittent every 48 hours  oseltamivir 30 milliGRAM(s) Oral two times a day  propranolol 20 milliGRAM(s) Oral every 8 hours  tamsulosin 0.4 milliGRAM(s) Oral at bedtime    MEDICATIONS  (PRN):  dextrose Oral Gel 15 Gram(s) Oral once PRN Blood Glucose LESS THAN 70 milliGRAM(s)/deciliter        CAPILLARY BLOOD GLUCOSE      POCT Blood Glucose.: 170 mg/dL (28 Jan 2025 07:28)  POCT Blood Glucose.: 199 mg/dL (27 Jan 2025 21:15)  POCT Blood Glucose.: 191 mg/dL (27 Jan 2025 16:40)  POCT Blood Glucose.: 101 mg/dL (27 Jan 2025 11:36)    I&O's Summary    27 Jan 2025 07:01  -  28 Jan 2025 07:00  --------------------------------------------------------  IN: 220 mL / OUT: 200 mL / NET: 20 mL        T(C): 37.2 (01-28-25 @ 04:44), Max: 37.3 (01-27-25 @ 19:39)  HR: 75 (01-28-25 @ 04:44) (70 - 79)  BP: 116/75 (01-28-25 @ 04:44) (116/75 - 125/70)  RR: 18 (01-28-25 @ 04:44) (18 - 18)  SpO2: 93% (01-28-25 @ 04:44) (93% - 97%)    PHYSICAL EXAM:  GENERAL: NAD  NECK: Supple, No JVD  CHEST/LUNG: Clear to auscultation bilaterally; No wheezing.  HEART: Regular rate and rhythm; No murmurs, rubs, or gallops  ABDOMEN: Soft, Nontender, Nondistended; Bowel sounds present  EXTREMITIES:   No edema  NEUROLOGY: AAO X 3      LABS:                        10.9   5.92  )-----------( 121      ( 27 Jan 2025 07:01 )             31.6     01-28    131[L]  |  96  |  29[H]  ----------------------------<  174[H]  3.2[L]   |  19[L]  |  1.44[H]    Ca    9.2      28 Jan 2025 07:19            Urinalysis Basic - ( 28 Jan 2025 07:19 )    Color: x / Appearance: x / SG: x / pH: x  Gluc: 174 mg/dL / Ketone: x  / Bili: x / Urobili: x   Blood: x / Protein: x / Nitrite: x   Leuk Esterase: x / RBC: x / WBC x   Sq Epi: x / Non Sq Epi: x / Bacteria: x      CAPILLARY BLOOD GLUCOSE      POCT Blood Glucose.: 170 mg/dL (28 Jan 2025 07:28)  POCT Blood Glucose.: 199 mg/dL (27 Jan 2025 21:15)  POCT Blood Glucose.: 191 mg/dL (27 Jan 2025 16:40)  POCT Blood Glucose.: 101 mg/dL (27 Jan 2025 11:36)        RADIOLOGY & ADDITIONAL TESTS:    Imaging Personally Reviewed:    Consultant(s) Notes Reviewed:      Care Discussed with Consultants/Other Providers:    Gregory Mcdonnell MD, CMD, FACP    [address] [place] [address]  Office Tel: [phone]  Cell: [phone]

## 2025-01-28 NOTE — PROGRESS NOTE ADULT - ASSESSMENT
74m with  HTN, DM, BPH, ckd, prior history of TB, previous smoker presents to ED for viral URI symptoms, generalized weakness and SOB.    influenza a + superimposed bacterial pna  thrombocytopenia     cxr right lobe infiltrate  mrsa neg  urine legionella neg   procal elevated    plan  tamiflu day 4 of  5 days  levofloxacin day 4 of 7    supportive oxygen/nebs/inhalers  will need surveillance imaging in 6-10 weeks to document resolution    pt evaluated face to face time in addition to reviewing history,  labs, microbiology and imaging.  antibiotic stewardship, local antibiogram, infection control strategies and potential transmission issues taken in to consideration at time of  treatment decision making process.

## 2025-01-29 ENCOUNTER — TRANSCRIPTION ENCOUNTER (OUTPATIENT)
Age: 75
End: 2025-01-29

## 2025-01-29 VITALS
RESPIRATION RATE: 16 BRPM | DIASTOLIC BLOOD PRESSURE: 73 MMHG | OXYGEN SATURATION: 93 % | HEART RATE: 62 BPM | TEMPERATURE: 98 F | SYSTOLIC BLOOD PRESSURE: 120 MMHG

## 2025-01-29 LAB
ANION GAP SERPL CALC-SCNC: 14 MMOL/L — SIGNIFICANT CHANGE UP (ref 5–17)
BUN SERPL-MCNC: 28 MG/DL — HIGH (ref 7–23)
CALCIUM SERPL-MCNC: 8.8 MG/DL — SIGNIFICANT CHANGE UP (ref 8.4–10.5)
CHLORIDE SERPL-SCNC: 97 MMOL/L — SIGNIFICANT CHANGE UP (ref 96–108)
CO2 SERPL-SCNC: 21 MMOL/L — LOW (ref 22–31)
CREAT SERPL-MCNC: 1.33 MG/DL — HIGH (ref 0.5–1.3)
EGFR: 56 ML/MIN/1.73M2 — LOW
GLUCOSE BLDC GLUCOMTR-MCNC: 170 MG/DL — HIGH (ref 70–99)
GLUCOSE BLDC GLUCOMTR-MCNC: 234 MG/DL — HIGH (ref 70–99)
GLUCOSE SERPL-MCNC: 169 MG/DL — HIGH (ref 70–99)
POTASSIUM SERPL-MCNC: 3.1 MMOL/L — LOW (ref 3.5–5.3)
POTASSIUM SERPL-SCNC: 3.1 MMOL/L — LOW (ref 3.5–5.3)
PROCALCITONIN SERPL-MCNC: 2.27 NG/ML — HIGH (ref 0.02–0.1)
SODIUM SERPL-SCNC: 132 MMOL/L — LOW (ref 135–145)

## 2025-01-29 PROCEDURE — 85027 COMPLETE CBC AUTOMATED: CPT

## 2025-01-29 PROCEDURE — 36415 COLL VENOUS BLD VENIPUNCTURE: CPT

## 2025-01-29 PROCEDURE — 87449 NOS EACH ORGANISM AG IA: CPT

## 2025-01-29 PROCEDURE — 87637 SARSCOV2&INF A&B&RSV AMP PRB: CPT

## 2025-01-29 PROCEDURE — 99285 EMERGENCY DEPT VISIT HI MDM: CPT

## 2025-01-29 PROCEDURE — 80053 COMPREHEN METABOLIC PANEL: CPT

## 2025-01-29 PROCEDURE — 82330 ASSAY OF CALCIUM: CPT

## 2025-01-29 PROCEDURE — 83605 ASSAY OF LACTIC ACID: CPT

## 2025-01-29 PROCEDURE — 82947 ASSAY GLUCOSE BLOOD QUANT: CPT

## 2025-01-29 PROCEDURE — 87640 STAPH A DNA AMP PROBE: CPT

## 2025-01-29 PROCEDURE — 71250 CT THORAX DX C-: CPT | Mod: MC

## 2025-01-29 PROCEDURE — 82962 GLUCOSE BLOOD TEST: CPT

## 2025-01-29 PROCEDURE — 84132 ASSAY OF SERUM POTASSIUM: CPT

## 2025-01-29 PROCEDURE — 87641 MR-STAPH DNA AMP PROBE: CPT

## 2025-01-29 PROCEDURE — 85014 HEMATOCRIT: CPT

## 2025-01-29 PROCEDURE — 83880 ASSAY OF NATRIURETIC PEPTIDE: CPT

## 2025-01-29 PROCEDURE — 96365 THER/PROPH/DIAG IV INF INIT: CPT

## 2025-01-29 PROCEDURE — 82803 BLOOD GASES ANY COMBINATION: CPT

## 2025-01-29 PROCEDURE — 82010 KETONE BODYS QUAN: CPT

## 2025-01-29 PROCEDURE — 84145 PROCALCITONIN (PCT): CPT

## 2025-01-29 PROCEDURE — 84295 ASSAY OF SERUM SODIUM: CPT

## 2025-01-29 PROCEDURE — 94640 AIRWAY INHALATION TREATMENT: CPT

## 2025-01-29 PROCEDURE — 93005 ELECTROCARDIOGRAM TRACING: CPT

## 2025-01-29 PROCEDURE — 82435 ASSAY OF BLOOD CHLORIDE: CPT

## 2025-01-29 PROCEDURE — 71045 X-RAY EXAM CHEST 1 VIEW: CPT

## 2025-01-29 PROCEDURE — 83036 HEMOGLOBIN GLYCOSYLATED A1C: CPT

## 2025-01-29 PROCEDURE — 96375 TX/PRO/DX INJ NEW DRUG ADDON: CPT

## 2025-01-29 PROCEDURE — 84484 ASSAY OF TROPONIN QUANT: CPT

## 2025-01-29 PROCEDURE — 80048 BASIC METABOLIC PNL TOTAL CA: CPT

## 2025-01-29 PROCEDURE — 85025 COMPLETE CBC W/AUTO DIFF WBC: CPT

## 2025-01-29 PROCEDURE — 85018 HEMOGLOBIN: CPT

## 2025-01-29 PROCEDURE — 0241U: CPT

## 2025-01-29 RX ORDER — LEVOFLOXACIN 500 MG/1
1 TABLET, FILM COATED ORAL
Qty: 1 | Refills: 0
Start: 2025-01-29 | End: 2025-01-29

## 2025-01-29 RX ORDER — POTASSIUM CHLORIDE 750 MG/1
40 TABLET, EXTENDED RELEASE ORAL ONCE
Refills: 0 | Status: COMPLETED | OUTPATIENT
Start: 2025-01-29 | End: 2025-01-29

## 2025-01-29 RX ORDER — POTASSIUM CHLORIDE 750 MG/1
2 TABLET, EXTENDED RELEASE ORAL
Qty: 2 | Refills: 0
Start: 2025-01-29 | End: 2025-01-29

## 2025-01-29 RX ORDER — OSELTAMIVIR PHOSPHATE 75 MG/1
1 CAPSULE ORAL
Qty: 1 | Refills: 0
Start: 2025-01-29 | End: 2025-01-29

## 2025-01-29 RX ADMIN — Medication 2: at 11:33

## 2025-01-29 RX ADMIN — Medication 6 UNIT(S): at 08:01

## 2025-01-29 RX ADMIN — IPRATROPIUM BROMIDE AND ALBUTEROL SULFATE 3 MILLILITER(S): .5; 2.5 SOLUTION RESPIRATORY (INHALATION) at 11:35

## 2025-01-29 RX ADMIN — Medication 5000 UNIT(S): at 05:13

## 2025-01-29 RX ADMIN — OSELTAMIVIR PHOSPHATE 30 MILLIGRAM(S): 75 CAPSULE ORAL at 05:13

## 2025-01-29 RX ADMIN — Medication 6 UNIT(S): at 11:34

## 2025-01-29 RX ADMIN — IPRATROPIUM BROMIDE AND ALBUTEROL SULFATE 3 MILLILITER(S): .5; 2.5 SOLUTION RESPIRATORY (INHALATION) at 05:12

## 2025-01-29 RX ADMIN — POTASSIUM CHLORIDE 40 MILLIEQUIVALENT(S): 750 TABLET, EXTENDED RELEASE ORAL at 14:03

## 2025-01-29 RX ADMIN — Medication 1: at 08:01

## 2025-01-29 NOTE — PROGRESS NOTE ADULT - SUBJECTIVE AND OBJECTIVE BOX
Date of Service: 01-29-25 @ 10:25    Patient is a 74y old  Male who presents with a chief complaint of The patient is a 74y Male complaining of shortness of breath. (29 Jan 2025 10:14)      Any change in ROS:   No new respiratory events overnight. Denies SOB/CP.  O2 sats low 90s on room air     MEDICATIONS  (STANDING):  albuterol/ipratropium for Nebulization 3 milliLiter(s) Nebulizer every 6 hours  dextrose 5%. 1000 milliLiter(s) (100 mL/Hr) IV Continuous <Continuous>  dextrose 5%. 1000 milliLiter(s) (50 mL/Hr) IV Continuous <Continuous>  dextrose 50% Injectable 25 Gram(s) IV Push once  dextrose 50% Injectable 12.5 Gram(s) IV Push once  dextrose 50% Injectable 25 Gram(s) IV Push once  glucagon  Injectable 1 milliGRAM(s) IntraMuscular once  heparin   Injectable 5000 Unit(s) SubCutaneous every 8 hours  insulin glargine Injectable (LANTUS) 15 Unit(s) SubCutaneous at bedtime  insulin lispro (ADMELOG) corrective regimen sliding scale   SubCutaneous three times a day before meals  insulin lispro Injectable (ADMELOG) 6 Unit(s) SubCutaneous three times a day before meals  levoFLOXacin IVPB 750 milliGRAM(s) IV Intermittent every 48 hours  oseltamivir 30 milliGRAM(s) Oral two times a day  propranolol 20 milliGRAM(s) Oral every 8 hours  tamsulosin 0.4 milliGRAM(s) Oral at bedtime    MEDICATIONS  (PRN):  dextrose Oral Gel 15 Gram(s) Oral once PRN Blood Glucose LESS THAN 70 milliGRAM(s)/deciliter    Vital Signs Last 24 Hrs  T(C): 37.3 (29 Jan 2025 05:12), Max: 37.3 (29 Jan 2025 05:12)  T(F): 99.2 (29 Jan 2025 05:12), Max: 99.2 (29 Jan 2025 05:12)  HR: 63 (29 Jan 2025 05:12) (62 - 95)  BP: 118/68 (29 Jan 2025 05:12) (118/68 - 133/75)  BP(mean): --  RR: 18 (29 Jan 2025 05:12) (16 - 18)  SpO2: 89% (29 Jan 2025 08:45) (89% - 95%)    Parameters below as of 29 Jan 2025 08:45  Patient On (Oxygen Delivery Method): room air        I&O's Summary    28 Jan 2025 07:01  -  29 Jan 2025 07:00  --------------------------------------------------------  IN: 0 mL / OUT: 750 mL / NET: -750 mL          Physical Exam:   GENERAL: NAD  HEENT: JIMMIE  ENMT: No tonsillar erythema, exudates, or enlargement  NECK: Supple, No JVD  CHEST/LUNG: R lung crackles   CVS: Regular rate and rhythm; No murmurs, rubs, or gallops  GI: : Soft, Nontender, Nondistended  NERVOUS SYSTEM:  Alert & Oriented X3  EXTREMITIES:  2+ Peripheral Pulses, No clubbing, cyanosis, or edema  LYMPH: No lymphadenopathy noted  SKIN: No rashes or lesions  ENDOCRINOLOGY: No Thyromegaly  PSYCH: Appropriate    Labs:  23                            10.9   5.92  )-----------( 121      ( 27 Jan 2025 07:01 )             31.6                         10.8   8.68  )-----------( 93       ( 26 Jan 2025 07:31 )             31.4                         11.5   10.34 )-----------( 85       ( 25 Jan 2025 12:15 )             33.2     01-29    132[L]  |  97  |  28[H]  ----------------------------<  169[H]  3.1[L]   |  21[L]  |  1.33[H]  01-28    131[L]  |  96  |  29[H]  ----------------------------<  174[H]  3.2[L]   |  19[L]  |  1.44[H]  01-27    132[L]  |  96  |  32[H]  ----------------------------<  129[H]  3.4[L]   |  22  |  1.57[H]  01-26    131[L]  |  99  |  29[H]  ----------------------------<  190[H]  3.9   |  20[L]  |  1.46[H]  01-25    127[L]  |  92[L]  |  34[H]  ----------------------------<  562[HH]  3.8   |  20[L]  |  1.78[H]    Ca    8.8      29 Jan 2025 07:00  Ca    9.2      28 Jan 2025 07:19    TPro  6.6  /  Alb  3.0[L]  /  TBili  1.8[H]  /  DBili  x   /  AST  18  /  ALT  11  /  AlkPhos  77  01-25    CAPILLARY BLOOD GLUCOSE      POCT Blood Glucose.: 170 mg/dL (29 Jan 2025 07:25)  POCT Blood Glucose.: 190 mg/dL (28 Jan 2025 21:45)  POCT Blood Glucose.: 277 mg/dL (28 Jan 2025 16:27)  POCT Blood Glucose.: 178 mg/dL (28 Jan 2025 11:32)          Urinalysis Basic - ( 29 Jan 2025 07:00 )    Color: x / Appearance: x / SG: x / pH: x  Gluc: 169 mg/dL / Ketone: x  / Bili: x / Urobili: x   Blood: x / Protein: x / Nitrite: x   Leuk Esterase: x / RBC: x / WBC x   Sq Epi: x / Non Sq Epi: x / Bacteria: x      Procalcitonin: 2.27 ng/mL (01-29 @ 07:00)  Procalcitonin: 13.90 ng/mL (01-26 @ 07:31)        Studies  < from: CT Chest No Cont (01.26.25 @ 11:57) >    ACC: [id number] EXAM:  CT CHEST   ORDERED BY: LUIS MIGUEL NOLASCO     PROCEDURE DATE:  01/26/2025          INTERPRETATION:  CLINICAL INFORMATION: Shortness of breath. Abnormal   chest x-ray.    COMPARISON: CT chest 7/9/2024    CONTRAST/COMPLICATIONS:  IV Contrast: NONE  Oral Contrast: NONE  .    PROCEDURE:  CT scan of the chest was obtained without intravenous contrast.    FINDINGS:    AIRWAYS/LUNGS/PLEURA: Patent central airways. Right upper, middle, and   lower lobe consolidation and groundglass opacities. Right middle and   lower lobe bronchiectasis and bronchial wall thickening. Scattered   bilateral peripheral nodules and left upper lobe tubular opacities, which   reflect bronchial impaction. Lingular linear scarring and bronchiectasis.   Scattered bibasilar predominant pulmonary cysts. Bilateral calcified   granulomas. Trace right pleural effusion. Focal left posterior pleural   calcification.    MEDIASTINUM AND WENDY: Calcified mediastinal and hilar lymph nodes.   Slightly prominent rightparatracheal lymph node measuring 1.8 x 0.8 cm   in size. Difficult to actually measure with there is hilar adenopathy   however appears not significantly changed in appearance since 7/24 exam.    VESSELS: Aortic calcifications. Coronary artery calcifications.    HEART: Heart size is normal. No pericardial effusion.    VISUALIZED UPPER ABDOMEN: Multiple calcified central abdominal and   bilateral crural lymph nodes. Multiple calcified splenic parenchymal   focus side.    CHEST WALL AND BONES: Thoracic spondylosis. A 2.2 cm left lower chest   wall lipoma.    IMPRESSION:    Right lung consolidation and groundglass opacities, compatible with   infectious inflammatory etiologies recommend follow-up CT chest in 8   weeks to ensure resolution.    Right middle and lower lobe predominant bronchiectasis.      --- End of Report ---        < end of copied text >

## 2025-01-29 NOTE — DISCHARGE NOTE NURSING/CASE MANAGEMENT/SOCIAL WORK - NSDCFUADDAPPT_GEN_ALL_CORE_FT
APPTS ARE READY TO BE MADE: [ ] YES    Best Family or Patient Contact (if needed):    Additional Information about above appointments (if needed):    1: PCP   2: Home Pulmonary  3: endocrine     Other comments or requests:

## 2025-01-29 NOTE — PROGRESS NOTE ADULT - ASSESSMENT
74m with  HTN, DM, BPH, ckd, prior history of TB, previous smoker presents to ED for viral URI symptoms, generalized weakness and SOB.    influenza a + superimposed bacterial pna  thrombocytopenia     cxr right lobe infiltrate  mrsa neg  urine legionella neg   procal elevated    plan  procalcitonin down  off supplemental oxygen  tamiflu day 5 of  5 days  levofloxacin every other day 5 of 7 til 1/31  dc planning     will need surveillance imaging in 6-10 weeks to document resolution    pt evaluated face to face time in addition to reviewing history,  labs, microbiology and imaging.  antibiotic stewardship, local antibiogram, infection control strategies and potential transmission issues taken in to consideration at time of  treatment decision making process.

## 2025-01-29 NOTE — DISCHARGE NOTE PROVIDER - NSFOLLOWUPCLINICS_GEN_ALL_ED_FT
[facility] Endocrinology  Endocrinology  [address] [place] [address]  Phone: [phone]  Fax:     Home Program  Pulmonary  [place]   Phone:   Fax:   Follow Up Time: 1 week

## 2025-01-29 NOTE — DISCHARGE NOTE PROVIDER - NSDCMRMEDTOKEN_GEN_ALL_CORE_FT
Centrum Multivitamin tablet: 1 tablet orally once a day  Lanmalcolm Solostar Pen 100 units/mL subcutaneous solution: 15 unit(s) subcutaneous once a day  propranolol 60 mg oral capsule, extended release: 1 cap(s) orally once a day  tamsulosin 0.4 mg oral capsule: 1 cap(s) orally once a day  Tradjenta 5 mg oral tablet: 1 tab(s) orally once a day   Centrum Multivitamin tablet: 1 tablet orally once a day  Lantus Solostar Pen 100 units/mL subcutaneous solution: 15 unit(s) subcutaneous once a day  levoFLOXacin 750 mg oral tablet: 1 tab(s) orally once a day take dose on 1/31/25  oseltamivir 30 mg oral capsule: 1 cap(s) orally 2 times a day last dose 1/29/25 6 pm  propranolol 60 mg oral capsule, extended release: 1 cap(s) orally once a day  tamsulosin 0.4 mg oral capsule: 1 cap(s) orally once a day  Tradjenta 5 mg oral tablet: 1 tab(s) orally once a day   Centrum Multivitamin tablet: 1 tablet orally once a day  Lantus Solostar Pen 100 units/mL subcutaneous solution: 15 unit(s) subcutaneous once a day  levoFLOXacin 750 mg oral tablet: 1 tab(s) orally once a day take dose on 1/31/25  oseltamivir 30 mg oral capsule: 1 cap(s) orally 2 times a day last dose 1/29/25 6 pm  Potassium Chloride (Eqv-Klor-Con M20) 20 mEq oral tablet, extended release: 2 tab(s) orally once a day take around 5 pm today 1/29/25  propranolol 60 mg oral capsule, extended release: 1 cap(s) orally once a day  tamsulosin 0.4 mg oral capsule: 1 cap(s) orally once a day  Tradjenta 5 mg oral tablet: 1 tab(s) orally once a day

## 2025-01-29 NOTE — PROGRESS NOTE ADULT - REASON FOR ADMISSION
The patient is a 74y Male complaining of shortness of breath.

## 2025-01-29 NOTE — PROGRESS NOTE ADULT - PROVIDER SPECIALTY LIST ADULT
Infectious Disease
Infectious Disease
Internal Medicine
Internal Medicine
Pulmonology
Pulmonology
Internal Medicine
Infectious Disease
Endocrinology
Pulmonology

## 2025-01-29 NOTE — PROGRESS NOTE ADULT - PROBLEM SELECTOR PROBLEM 1
DM (diabetes mellitus)
Shortness of breath

## 2025-01-29 NOTE — DISCHARGE NOTE PROVIDER - YES NO FOR MLM POSITIVE OR NEGATIVE COVID RESULT
Show Aperture Variable?: Yes Post-Care Instructions: I reviewed with the patient in detail post-care instructions. Patient is to wear sunprotection, and avoid picking at any of the treated lesions. Pt may apply Vaseline to crusted or scabbing areas. Render Note In Bullet Format When Appropriate: No Detail Level: Detailed Duration Of Freeze Thaw-Cycle (Seconds): 0 Consent: The patient's consent was obtained including but not limited to risks of crusting, scabbing, blistering, scarring, darker or lighter pigmentary change, recurrence, incomplete removal and infection. ,

## 2025-01-29 NOTE — DISCHARGE NOTE NURSING/CASE MANAGEMENT/SOCIAL WORK - NSDCPEFALRISK_GEN_ALL_CORE
For information on Fall & Injury Prevention, visit: [email or web address] OR  [email or web address] OR  https://www.cdc.gov/steadi/patient.html

## 2025-01-29 NOTE — DISCHARGE NOTE NURSING/CASE MANAGEMENT/SOCIAL WORK - NSTRANSFERBELONGINGSDISPO_GEN_A_NUR
Patient Instructions   COVID testing initiated  Results may take up to 5-10 days to return, but often come back sooner (2-4 days)     If the patient has a [facility] My Chart account, results may be accessed on line  If the patient does not have the [facility] Chart account, please establish one so results can be accessed  This will be the easiest and quickest way to get a copy of your test results if you require printed documentation  If patient is symptomatic and until results are obtained, home quarantine / self isolation strongly encouraged  If testing is done for screening purposes and patient is not symptomatic, we still recommend masking, social distancing, good hygiene practices be followed  If COVID test is positive, patient / care giver will be contacted by ordering provider or designated staff  If COVID test is positive, please call the primary care provider office to inform of positive test and request follow up evaluation appointment  (Generally, primary care providers are doing telemedicine visits with their positive COVID patients )  If COVID test is positive, please again review all information below  Further questions may be addressed by the primary care provider or the [address] [place] [address] Sherine at [phone]  If the patient / caregiver has not heard about test results or has been unable to access results on the patient My Chart account in a timely fashion, please call the provider's office where test was ordered (or Hot Line if applicable)  to inquire about results  If results are negative and patient / care giver has been found to have already accessed results through the [address]  Exeg[facility] Chart rosaura, no call will be made  Until results are obtained, home quarantine / self isolation strongly encouraged       If the patient would develop profound weakness, chest pain, shortness of breath please proceed to an emergency room for further evaluation otherwise we do recommend that patient follow-up with their primary care provider in the next 5-7 days if not improving  Symptomatic treatment as needed for symptoms relief based on age / medical status of patient  Things like warm salt water gargles, Tylenol or Ibuprofen (if not contraindicated), drinking plenty of fluids, nasal saline rinses / spray, warm tea with honey (not for patients less than 1 year of age),  etc may provide symptoms relief  [address]     Your healthcare provider and/or public health staff have evaluated you and have determined that you do not need to remain in the hospital at this time  At this time you can be isolated at home where you will be monitored by staff from your local or state health department  You should carefully follow the prevention and isolation steps below until a healthcare provider or local or state health department says that you can return to your normal activities  Stay home except to get medical care     People who are mildly ill with COVID-19 are able to isolate at home during their illness  You should restrict activities outside your home, except for getting medical care  Do not go to work, school, or public areas  Avoid using public transportation, ride-sharing, or taxis  Separate yourself from other people and animals in your home     People: As much as possible, you should stay in a specific room and away from other people in your home  Also, you should use a separate bathroom, if available  Animals: You should restrict contact with pets and other animals while you are sick with COVID-19, just like you would around other people  Although there have not been reports of pets or other animals becoming sick with COVID-19, it is still recommended that people sick with COVID-19 limit contact with animals until more information is known about the virus   When possible, have another member of your household care for your animals while you are sick  If you are sick with COVID-19, avoid contact with your pet, including petting, snuggling, being kissed or licked, and sharing food  If you must care for your pet or be around animals while you are sick, wash your hands before and after you interact with pets and wear a facemask  See COVID-19 and Animals for more information  Call ahead before visiting your doctor     If you have a medical appointment, call the healthcare provider and tell them that you have or may have COVID-19  This will help the healthcare providers office take steps to keep other people from getting infected or exposed  Wear a facemask     You should wear a facemask when you are around other people (e g , sharing a room or vehicle) or pets and before you enter a healthcare providers office  If you are not able to wear a facemask (for example, because it causes trouble breathing), then people who live with you should not stay in the same room with you, or they should wear a facemask if they enter your room  Cover your coughs and sneezes     Cover your mouth and nose with a tissue when you cough or sneeze  Throw used tissues in a lined trash can  Immediately wash your hands with soap and water for at least 20 seconds or, if soap and water are not available, clean your hands with an alcohol-based hand  that contains at least 60% alcohol  Clean your hands often     Wash your hands often with soap and water for at least 20 seconds, especially after blowing your nose, coughing, or sneezing; going to the bathroom; and before eating or preparing food  If soap and water are not readily available, use an alcohol-based hand  with at least 60% alcohol, covering all surfaces of your hands and rubbing them together until they feel dry  Soap and water are the best option if hands are visibly dirty  Avoid touching your eyes, nose, and mouth with unwashed hands       Avoid sharing personal household items     You should not share dishes, drinking glasses, cups, eating utensils, towels, or bedding with other people or pets in your home  After using these items, they should be washed thoroughly with soap and water  Clean all high-touch surfaces everyday     High touch surfaces include counters, tabletops, doorknobs, bathroom fixtures, toilets, phones, keyboards, tablets, and bedside tables  Also, clean any surfaces that may have blood, stool, or body fluids on them  Use a household cleaning spray or wipe, according to the label instructions  Labels contain instructions for safe and effective use of the cleaning product including precautions you should take when applying the product, such as wearing gloves and making sure you have good ventilation during use of the product  Monitor your symptoms     Seek prompt medical attention if your illness is worsening (e g , difficulty breathing)  Before seeking care, call your healthcare provider and tell them that you have, or are being evaluated for, COVID-19  Put on a facemask before you enter the facility  These steps will help the healthcare providers office to keep other people in the office or waiting room from getting infected or exposed  Ask your healthcare provider to call the local or [facility] health department  Persons who are placed under active monitoring or facilitated self-monitoring should follow instructions provided by their local health department or occupational health professionals, as appropriate  If you have a medical emergency and need to call 911, notify the dispatch personnel that you have, or are being evaluated for COVID-19  If possible, put on a facemask before emergency medical services arrive       Discontinuing home isolation     Patients with confirmed COVID-19 should remain under home isolation precautions until the following conditions are met:   § They have had no fever for at least 24 hours (that is one full day of no fever without the use medicine that reduces fevers)  AND  § other symptoms have improved (for example, when their cough or shortness of breath have improved)  AND  § at least 10 days have passed since their symptoms first appeared     Patients with confirmed COVID-19 should also notify close contacts (including their workplace) and ask that they self-quarantine  Currently, close contact is defined as being within 6 feet for for a cumulative total of 15 minutes or more over a 24 hour period starting from 2 days before illness onset  (or, for asymptomatic patients, 2 days prior to test specimen collection)  Close contacts of patients diagnosed with COVID-19 should be instructed by the patient to self-quarantine for 14 days from the last time of their last contact with the patient        Source: [employer] fi given to family/with patient

## 2025-01-29 NOTE — PROGRESS NOTE ADULT - SUBJECTIVE AND OBJECTIVE BOX
Chief complaint    Patient is a 74y old  Male who presents with a chief complaint of The patient is a 74y Male complaining of shortness of breath. (28 Jan 2025 14:45)   Review of systems  Patient appears comfortable.    Labs and Fingersticks  CAPILLARY BLOOD GLUCOSE      POCT Blood Glucose.: 170 mg/dL (29 Jan 2025 07:25)  POCT Blood Glucose.: 190 mg/dL (28 Jan 2025 21:45)  POCT Blood Glucose.: 277 mg/dL (28 Jan 2025 16:27)  POCT Blood Glucose.: 178 mg/dL (28 Jan 2025 11:32)      Anion Gap: 14 (01-29 @ 07:00)  Anion Gap: 16 (01-28 @ 07:19)      Calcium: 8.8 (01-29 @ 07:00)  Calcium: 9.2 (01-28 @ 07:19)          01-29    132[L]  |  97  |  28[H]  ----------------------------<  169[H]  3.1[L]   |  21[L]  |  1.33[H]    Ca    8.8      29 Jan 2025 07:00      Medications  MEDICATIONS  (STANDING):  albuterol/ipratropium for Nebulization 3 milliLiter(s) Nebulizer every 6 hours  dextrose 5%. 1000 milliLiter(s) (100 mL/Hr) IV Continuous <Continuous>  dextrose 5%. 1000 milliLiter(s) (50 mL/Hr) IV Continuous <Continuous>  dextrose 50% Injectable 25 Gram(s) IV Push once  dextrose 50% Injectable 12.5 Gram(s) IV Push once  dextrose 50% Injectable 25 Gram(s) IV Push once  glucagon  Injectable 1 milliGRAM(s) IntraMuscular once  heparin   Injectable 5000 Unit(s) SubCutaneous every 8 hours  insulin glargine Injectable (LANTUS) 15 Unit(s) SubCutaneous at bedtime  insulin lispro (ADMELOG) corrective regimen sliding scale   SubCutaneous three times a day before meals  insulin lispro Injectable (ADMELOG) 6 Unit(s) SubCutaneous three times a day before meals  levoFLOXacin IVPB 750 milliGRAM(s) IV Intermittent every 48 hours  oseltamivir 30 milliGRAM(s) Oral two times a day  propranolol 20 milliGRAM(s) Oral every 8 hours  tamsulosin 0.4 milliGRAM(s) Oral at bedtime      Physical Exam  General: Patient appears comfortable.  Vital Signs Last 12 Hrs  T(F): 99.2 (01-29-25 @ 05:12), Max: 99.2 (01-29-25 @ 05:12)  HR: 63 (01-29-25 @ 05:12) (62 - 63)  BP: 118/68 (01-29-25 @ 05:12) (118/68 - 133/75)  BP(mean): --  RR: 18 (01-29-25 @ 05:12) (18 - 18)  SpO2: 89% (01-29-25 @ 08:45) (89% - 94%)  Neck: No palpable thyroid nodules.  CVS: S1S2, No murmurs  Respiratory: No wheezing, no crepitations  GI: Abdomen soft, non tender.    Diagnostics        Radiology:

## 2025-01-29 NOTE — PROGRESS NOTE ADULT - PROBLEM SELECTOR PLAN 3
-Flu A +, CXR with R sided infiltrate  -CT chest with R lung consolidation  -ABX as per ID  -Procalcitonin elevated but downtrending.
-Flu A +, CXR with R sided infiltrate  -CT chest with R lung consolidation  -ABX as per ID  -Procalcitonin elevated, continue to trend (ordered for AM).
-Flu A +, CXR with R sided infiltrate  -CT chest with R lung consolidation  -ABX as per ID  -Procalcitonin elevated, continue to trend.

## 2025-01-29 NOTE — DISCHARGE NOTE NURSING/CASE MANAGEMENT/SOCIAL WORK - PATIENT PORTAL LINK FT
You can access the FollowMyHealth Patient Portal offered by [facility] by registering at the following website: [email or web address]. By joining [facility]’s FollowMyHealth portal, you will also be able to view your health information using other applications (apps) compatible with our system.

## 2025-01-29 NOTE — PROGRESS NOTE ADULT - PROBLEM SELECTOR PLAN 2
-RVP + Flu A  -Continue Tamiflu  -Supportive care
Suggest to continue medications, monitoring, FU primary team recommendations.
-RVP + Flu A  -Continue Tamiflu  -Supportive care
-RVP + Flu A  -Continue Tamiflu  -Supportive care

## 2025-01-29 NOTE — DISCHARGE NOTE PROVIDER - HOSPITAL COURSE
HPI:  74-year-old male past medical HTN, DM, BPH, previous smoker presents to ED for viral URI symptoms, generalized weakness and SOB.  Patient placed on 2L NC by EMS for desatting to 88%.  Endorses associated sore throat, dry cough, and nasal congestion.  Grandchildren positive for influenza.  No recent travel.  Denies nausea, vomiting, chest pain, abdominal pain, urinary symptoms, change in bowel movement.  No prior history of PE/DVT.    (25 Jan 2025 17:10)    Hospital Course:  Pt presented with AHRF secondary to pneumonia and flu A. CT chest noted for R lung consolidation and GGO. Evaled per ID and pulm and rec for 7 day course levaquin and 5 day course tamiflu. Pt intially required supplemental O2, now weaned to RA, stable. Pt evaled by endo for uncontrolled DM2, pt to follow up as outpatient.     Important Medication Changes and Reason:  - see med rec    Active or Pending Issues Requiring Follow-up:  - PCP, pulmonary    Advanced Directives:   [X] Full code  [ ] DNR  [ ] Hospice    Discharge Diagnoses:  - AHRF 2/2 pneumonia and flu A         Discharge/Dispo/Med rec discussed with attending Dr. Mcdonnell. Patient medically cleared for discharge Home with outpatient follow up with PCP and pulmonary

## 2025-01-29 NOTE — PROGRESS NOTE ADULT - SUBJECTIVE AND OBJECTIVE BOX
[facility]  Infectious Disease  Dr Jin, Dr Loyd, Dr Patten, JOANN Naik Eladio  [phone]  after hours and weekends [phone]    Name: SPIKE TERRY  Age: 74y  Gender: Male  MRN: [id number]    Interval History--  Notes reviewed  states hes fine  cough is better       Allergies    cephalosporins (Other)    Intolerances        Medications--  Antibiotics:  levoFLOXacin IVPB 750 milliGRAM(s) IV Intermittent every 48 hours  oseltamivir 30 milliGRAM(s) Oral two times a day    Immunologic:    Other:  albuterol/ipratropium for Nebulization  dextrose 5%.  dextrose 5%.  dextrose 50% Injectable  dextrose 50% Injectable  dextrose 50% Injectable  dextrose Oral Gel PRN  glucagon  Injectable  heparin   Injectable  insulin glargine Injectable (LANTUS)  insulin lispro (ADMELOG) corrective regimen sliding scale  insulin lispro Injectable (ADMELOG)  propranolol  tamsulosin      Review of Systems--  A 10-point review of systems was obtained.   limited .    Review of systems otherwise negative except as previously noted.    Physical Examination--  Vital Signs: T(F): 98.2 (01-29-25 @ 14:30), Max: 99.2 (01-29-25 @ 05:12)  HR: 62 (01-29-25 @ 14:30)  BP: 120/73 (01-29-25 @ 14:30)  RR: 16 (01-29-25 @ 14:30)  SpO2: 93% (01-29-25 @ 14:30)  Wt(kg): --  General: Nontoxic-appearing Male in no acute distress.  HEENT: AT/NC.   Neck: Not rigid. No sense of mass.  Nodes: None palpable.  Lungs: coarse bs  occassionally right side   decreased bs   Heart: Regular rate and rhythm.   Abdomen: Bowel sounds present and normoactive. Soft. Nondistended. Nontender.  Back: No spinal tenderness. No costovertebral angle tenderness.   Extremities: No cyanosis or clubbing. No edema.   Skin: Warm. Dry. Good turgor. No rash. No vasculitic stigmata.  Psychiatric: Appropriate affect and mood for situation.         Laboratory Studies--  CBC      Chemistries  01-29    132[L]  |  97  |  28[H]  ----------------------------<  169[H]  3.1[L]   |  21[L]  |  1.33[H]    Ca    8.8      29 Jan 2025 07:00        Culture Data

## 2025-01-29 NOTE — DISCHARGE NOTE PROVIDER - NSDCFUADDAPPT_GEN_ALL_CORE_FT
APPTS ARE READY TO BE MADE: [ ] YES    Best Family or Patient Contact (if needed):    Additional Information about above appointments (if needed):    1: PCP   2: Home Pulmonary  3: endocrine     Other comments or requests:    APPTS ARE READY TO BE MADE: [X] YES    Best Family or Patient Contact (if needed):    Additional Information about above appointments (if needed):    1: PCP   2: Home Pulmonary  3: endocrine     Other comments or requests:

## 2025-01-29 NOTE — PROGRESS NOTE ADULT - ASSESSMENT
Assessment  DMT2: 74y Male with DM T2 with hyperglycemia admitted with SOB, patient was on oral hypoglycemic agents and insulin at home, now insulin coverage, eating meals, blood sugars are improving   Patient is high risk with high level decision making due to uncontrolled diabetes, has increased risk for complications.   SOB: on medications, monitored, no acute events.  HTN: On antihypertensive medications, monitored, stable.      Darryl Reid MD  Cell: [phone]  Office: [phone]

## 2025-01-29 NOTE — PROGRESS NOTE ADULT - PROBLEM SELECTOR PLAN 1
Will continue current insulin regimen for now.   Will continue monitoring FS, log, and glucose trends, will Follow up.  Patient counseled for compliance with consistent low carb diet and exercise as tolerated outpatient.
Will continue current insulin regimen for now.   Will continue monitoring FS, log, and glucose trends, will Follow up.  Patient counseled for compliance with consistent low carb diet and exercise as tolerated outpatient.    May get DC home on home DM meds. FU 2 weeks
Will continue current insulin regimen for now.   Will continue monitoring FS, log, and glucose trends, will Follow up.  Patient counseled for compliance with consistent low carb diet and exercise as tolerated outpatient.
2nd to Influenza + PNA  -Flu A +, CXR with R sided infiltrate  -CT chest with R lung consolidation, RML/RLL bronchiectasis   -ABX as per ID  -Duoneb q6h  -No wheeze on exam, monitoring off steroids   -Keep sats >88% with O2 PRN. Seen on room air with sats low 90s but noted to have mild hypoxia on room air this AM to 89% per flowsheet. Suggest check O2 sat on room air with ambulation.
2nd to Influenza + PNA  -Flu A +, CXR with R sided infiltrate  -CT chest with R lung consolidation, RML/RLL bronchiectasis   -ABX as per ID  -Duoneb q6h  -No wheeze on exam, monitoring off steroids   -Keep sats >90% with O2 PRN. Now tolerating room air
2nd to Influenza + PNA  -Flu A +, CXR with R sided infiltrate  -CT chest with R lung consolidation, RML/RLL bronchiectasis   -ABX as per ID  -Duoneb q6h  -No wheeze on exam, monitoring off steroids   -Keep sats >90% with O2 PRN. Wean O2 as tolerated.

## 2025-01-29 NOTE — DISCHARGE NOTE PROVIDER - NSDCCPCAREPLAN_GEN_ALL_CORE_FT
PRINCIPAL DISCHARGE DIAGNOSIS  Diagnosis: Pneumonia  Assessment and Plan of Treatment: Pneumonia is a lung infection that can cause a fever, cough, and trouble breathing.  Continue all antibiotics as ordered until complete (end date 1/31/25)   Nutrition is important, eat small frequent meals.  Get lots of rest and drink fluids.  Call your health care provider upon arrival home from hospital and make a follow up appointment for one week.  If your cough worsens, you develop fever greater than 101', you have shaking chills, a fast heartbeat, trouble breathing and/or feel your are breathing much faster than usual, call your healthcare provider.  Make sure you wash your hands frequently.        SECONDARY DISCHARGE DIAGNOSES  Diagnosis: Influenza A  Assessment and Plan of Treatment: weaned off oxygen  if any worsening symptoms or shortness of breath, contact provider     PRINCIPAL DISCHARGE DIAGNOSIS  Diagnosis: Pneumonia  Assessment and Plan of Treatment: follow up with pulmonary outpatient  follow up with provider  in 6-10 weeks for repeat imaging to assess for resolution   Pneumonia is a lung infection that can cause a fever, cough, and trouble breathing.  Continue all antibiotics as ordered until complete (end date 1/31/25)   Nutrition is important, eat small frequent meals.  Get lots of rest and drink fluids.  Call your health care provider upon arrival home from hospital and make a follow up appointment for one week.  If your cough worsens, you develop fever greater than 101', you have shaking chills, a fast heartbeat, trouble breathing and/or feel your are breathing much faster than usual, call your healthcare provider.  Make sure you wash your hands frequently.        SECONDARY DISCHARGE DIAGNOSES  Diagnosis: Influenza A  Assessment and Plan of Treatment: weaned off oxygen  if any worsening symptoms or shortness of breath, contact provider     PRINCIPAL DISCHARGE DIAGNOSIS  Diagnosis: Pneumonia  Assessment and Plan of Treatment: follow up with pulmonary outpatient  follow up with provider  in 6-10 weeks for repeat imaging to assess for resolution   Pneumonia is a lung infection that can cause a fever, cough, and trouble breathing.  Continue all antibiotics as ordered until complete (end date 1/31/25)   Nutrition is important, eat small frequent meals.  Get lots of rest and drink fluids.  Call your health care provider upon arrival home from hospital and make a follow up appointment for one week.  If your cough worsens, you develop fever greater than 101', you have shaking chills, a fast heartbeat, trouble breathing and/or feel your are breathing much faster than usual, call your healthcare provider.  Make sure you wash your hands frequently.        SECONDARY DISCHARGE DIAGNOSES  Diagnosis: Influenza A  Assessment and Plan of Treatment: weaned off oxygen  if any worsening symptoms or shortness of breath, contact provider    Diagnosis: Hypokalemia  Assessment and Plan of Treatment: repleted. take potassium dose as prescribed  follow up with PCP outpatient to obtain repeat BMP lab draw to check potassium level

## 2025-01-29 NOTE — DISCHARGE NOTE NURSING/CASE MANAGEMENT/SOCIAL WORK - FINANCIAL ASSISTANCE
[facility] provides services at a reduced cost to those who are determined to be eligible through [facility]’s financial assistance program. Information regarding [facility]’s financial assistance program can be found by going to [email or web address] or by calling [phone].

## 2025-01-29 NOTE — PROGRESS NOTE ADULT - NS ATTEND AMEND GEN_ALL_CORE FT
Chart, labs, vitals, radiology reviewed. Above H&P reviewed and edited where appropriate. Agree with history and physical exam. Agree with assessment and plan. I reviewed the overnight course of events and discussed the care with the patient/ family. All the decisions in assessment and plan are made by me.
seems to be doing  ok ; no cough : no phlegm  :   alert and awake:   cont current rx:
seems  OK: no sob:  being treated for influenza and flu : no dramatic change in his clinical condition since yesterday  : cont current rx:
wife is at bedside:  pt is doing  ok : no sob:  agree with current rx:   he looks and feels better

## 2025-01-29 NOTE — DISCHARGE NOTE PROVIDER - CARE PROVIDER_API CALL
Dilcia Simpson  [address] [address]  Phone: [phone]  Fax: [phone]  Follow Up Time:

## 2025-01-29 NOTE — PROGRESS NOTE ADULT - PROBLEM SELECTOR PLAN 4
Patient ahd the 2 shots when here and neither did anything  sheis also taking robaxin and that's not helping either   Can something else be send to [facility] gap
-By hx, reportedly from prior hx of TB  -CT chest with RML/RLL bronchiectasis.

## 2025-02-13 ENCOUNTER — APPOINTMENT (OUTPATIENT)
Dept: ENDOCRINOLOGY | Facility: CLINIC | Age: 75
End: 2025-02-13

## 2025-02-25 ENCOUNTER — APPOINTMENT (OUTPATIENT)
Dept: ENDOCRINOLOGY | Facility: CLINIC | Age: 75
End: 2025-02-25
Payer: MEDICARE

## 2025-02-25 VITALS
HEIGHT: 72 IN | DIASTOLIC BLOOD PRESSURE: 84 MMHG | SYSTOLIC BLOOD PRESSURE: 152 MMHG | WEIGHT: 134 LBS | BODY MASS INDEX: 18.15 KG/M2 | OXYGEN SATURATION: 98 % | HEART RATE: 84 BPM

## 2025-02-25 DIAGNOSIS — E11.9 TYPE 2 DIABETES MELLITUS W/OUT COMPLICATIONS: ICD-10-CM

## 2025-02-25 PROCEDURE — 99214 OFFICE O/P EST MOD 30 MIN: CPT

## 2025-02-25 PROCEDURE — G2211 COMPLEX E/M VISIT ADD ON: CPT

## 2025-02-25 RX ORDER — BLOOD-GLUCOSE,RECEIVER,CONT
EACH MISCELLANEOUS
Qty: 1 | Refills: 0 | Status: ACTIVE | COMMUNITY
Start: 2025-02-25 | End: 1900-01-01

## 2025-02-25 RX ORDER — TAMSULOSIN HYDROCHLORIDE 0.4 MG/1
0.4 CAPSULE ORAL
Refills: 0 | Status: ACTIVE | COMMUNITY

## 2025-02-25 RX ORDER — MULTIVITAMIN/IRON/FOLIC ACID 18MG-0.4MG
TABLET ORAL
Refills: 0 | Status: ACTIVE | COMMUNITY

## 2025-02-25 RX ORDER — METFORMIN HYDROCHLORIDE 500 MG/1
500 TABLET, EXTENDED RELEASE ORAL
Qty: 180 | Refills: 3 | Status: ACTIVE | COMMUNITY
Start: 2025-02-25 | End: 1900-01-01

## 2025-02-25 RX ORDER — BLOOD-GLUCOSE SENSOR
EACH MISCELLANEOUS
Qty: 2 | Refills: 11 | Status: ACTIVE | COMMUNITY
Start: 2025-02-25 | End: 1900-01-01

## 2025-02-25 RX ORDER — PROPRANOLOL HYDROCHLORIDE 60 MG/1
60 TABLET ORAL
Refills: 0 | Status: ACTIVE | COMMUNITY

## 2025-03-22 ENCOUNTER — RX RENEWAL (OUTPATIENT)
Age: 75
End: 2025-03-22

## 2025-04-09 ENCOUNTER — RX CHANGE (OUTPATIENT)
Age: 75
End: 2025-04-09

## 2025-04-09 RX ORDER — METFORMIN ER 500 MG 500 MG/1
500 TABLET ORAL
Qty: 180 | Refills: 3 | Status: ACTIVE | COMMUNITY
Start: 1900-01-01 | End: 1900-01-01

## 2025-05-29 ENCOUNTER — APPOINTMENT (OUTPATIENT)
Dept: ENDOCRINOLOGY | Facility: CLINIC | Age: 75
End: 2025-05-29

## 2025-05-29 VITALS
DIASTOLIC BLOOD PRESSURE: 90 MMHG | WEIGHT: 140 LBS | HEART RATE: 79 BPM | BODY MASS INDEX: 18.96 KG/M2 | SYSTOLIC BLOOD PRESSURE: 176 MMHG | HEIGHT: 72 IN | OXYGEN SATURATION: 98 %

## 2025-05-29 DIAGNOSIS — E11.9 TYPE 2 DIABETES MELLITUS W/OUT COMPLICATIONS: ICD-10-CM

## 2025-05-29 PROCEDURE — G2211 COMPLEX E/M VISIT ADD ON: CPT

## 2025-05-29 PROCEDURE — 82962 GLUCOSE BLOOD TEST: CPT

## 2025-05-29 PROCEDURE — 99215 OFFICE O/P EST HI 40 MIN: CPT

## 2025-05-29 PROCEDURE — 83036 HEMOGLOBIN GLYCOSYLATED A1C: CPT | Mod: QW

## 2025-06-02 LAB
GLUCOSE BLDC GLUCOMTR-MCNC: 137
HBA1C MFR BLD HPLC: 8.4

## 2025-06-03 ENCOUNTER — NON-APPOINTMENT (OUTPATIENT)
Age: 75
End: 2025-06-03

## 2025-06-03 LAB
ANION GAP SERPL CALC-SCNC: 14 MMOL/L
BUN SERPL-MCNC: 25 MG/DL
CALCIUM SERPL-MCNC: 9.7 MG/DL
CHLORIDE SERPL-SCNC: 102 MMOL/L
CHOLEST SERPL-MCNC: 221 MG/DL
CO2 SERPL-SCNC: 22 MMOL/L
CREAT SERPL-MCNC: 1.32 MG/DL
CREAT SPEC-SCNC: 110 MG/DL
EGFRCR SERPLBLD CKD-EPI 2021: 57 ML/MIN/1.73M2
GLUCOSE SERPL-MCNC: 123 MG/DL
HDLC SERPL-MCNC: 53 MG/DL
LDLC SERPL-MCNC: 142 MG/DL
MICROALBUMIN 24H UR DL<=1MG/L-MCNC: 64.7 MG/DL
MICROALBUMIN/CREAT 24H UR-RTO: 590 MG/G
NONHDLC SERPL-MCNC: 167 MG/DL
POTASSIUM SERPL-SCNC: 4.7 MMOL/L
SODIUM SERPL-SCNC: 138 MMOL/L
TRIGL SERPL-MCNC: 143 MG/DL
VIT B12 SERPL-MCNC: 1104 PG/ML

## 2025-07-18 ENCOUNTER — RX RENEWAL (OUTPATIENT)
Age: 75
End: 2025-07-18

## (undated) DEVICE — IRR BULB PATHFINDER + 10"

## (undated) DEVICE — FOLEY TRAY 16FR 5CC LTX UMETER CLOSED

## (undated) DEVICE — VENODYNE/SCD SLEEVE CALF MEDIUM

## (undated) DEVICE — AMPLATZ RENAL DILATOR 6FR-30FR X 20CM

## (undated) DEVICE — GOWN TRIMAX LG

## (undated) DEVICE — SUT POLYSORB 2-0 18" V-20

## (undated) DEVICE — GLV 8 PROTEXIS (WHITE)

## (undated) DEVICE — DRAPE TOWEL BLUE 17" X 24"

## (undated) DEVICE — BOSTON SCIENTIFC ENCORE 26 INFLATOR

## (undated) DEVICE — SPECIMEN CONTAINER 100ML

## (undated) DEVICE — FOLEY CATH 2-WAY 16FR 5CC LATEX COUNCIL RED

## (undated) DEVICE — PREP BETADINE KIT

## (undated) DEVICE — POSITIONER FOAM EGG CRATE ULNAR 2PCS (PINK)

## (undated) DEVICE — ADAPTER URETHRAL CATH CONNECTING

## (undated) DEVICE — TUBING THERMADX UROLOGY

## (undated) DEVICE — PACK CYSTO

## (undated) DEVICE — SOL IRR BAG H2O 3000ML

## (undated) DEVICE — FOLEY HOLDER STATLOCK 2 WAY ADULT

## (undated) DEVICE — POSITIONER FOAM HEADREST (PINK)

## (undated) DEVICE — ADAPTER CHECK FLO 9FR STERILE

## (undated) DEVICE — SOL IRR POUR NS 0.9% 500ML

## (undated) DEVICE — SOL IRR BAG NS 0.9% 3000ML

## (undated) DEVICE — Device

## (undated) DEVICE — WARMING BLANKET UPPER ADULT

## (undated) DEVICE — DRAPE U (CLEAR) 47 X 51" NON STERILE

## (undated) DEVICE — WARMING BLANKET FULL ADULT

## (undated) DEVICE — TUBING CONNECTING FOR DRAINAGE BAG MALE / FEMALE 14FR 30CM

## (undated) DEVICE — TUBING SUCTION 20FT

## (undated) DEVICE — FOLEY CATH 2-WAY 20FR 5CC LATEX COUNCIL RED

## (undated) DEVICE — DRAPE LINGEMAN TUR

## (undated) DEVICE — POSITIONER CUSHION INSERT PRONE VIEW LG

## (undated) DEVICE — FOLEY CATH 2-WAY 18FR 5CC LATEX COUNCIL RED

## (undated) DEVICE — ACMI SELF-SEALING SEAL UP TO 7FR

## (undated) DEVICE — NDL 20CM TROCAR

## (undated) DEVICE — NDL BIOPSY TROCAR TWO-PART 18G X 20CM

## (undated) DEVICE — DRSG TEGADERM 6"X8"

## (undated) DEVICE — DRAPE NEPHROSCOPY 72X118"